# Patient Record
Sex: FEMALE | Race: WHITE | Employment: OTHER | URBAN - METROPOLITAN AREA
[De-identification: names, ages, dates, MRNs, and addresses within clinical notes are randomized per-mention and may not be internally consistent; named-entity substitution may affect disease eponyms.]

---

## 2017-01-10 ENCOUNTER — HOSPITAL ENCOUNTER (OUTPATIENT)
Dept: LAB | Age: 67
Discharge: HOME | End: 2017-01-10
Payer: COMMERCIAL

## 2017-01-10 ENCOUNTER — OFFICE VISIT (OUTPATIENT)
Dept: INTERNAL MEDICINE CLINIC | Age: 67
End: 2017-01-10

## 2017-01-10 VITALS
OXYGEN SATURATION: 96 % | BODY MASS INDEX: 31.71 KG/M2 | HEIGHT: 63 IN | RESPIRATION RATE: 17 BRPM | TEMPERATURE: 98.4 F | SYSTOLIC BLOOD PRESSURE: 138 MMHG | HEART RATE: 61 BPM | DIASTOLIC BLOOD PRESSURE: 72 MMHG | WEIGHT: 179 LBS

## 2017-01-10 DIAGNOSIS — E05.00 GRAVES DISEASE: ICD-10-CM

## 2017-01-10 DIAGNOSIS — I11.9 BENIGN HYPERTENSIVE HEART DISEASE WITHOUT HEART FAILURE: ICD-10-CM

## 2017-01-10 DIAGNOSIS — L30.9 DERMATITIS: ICD-10-CM

## 2017-01-10 DIAGNOSIS — M81.0 OSTEOPOROSIS: ICD-10-CM

## 2017-01-10 DIAGNOSIS — I48.0 PAF (PAROXYSMAL ATRIAL FIBRILLATION) (HCC): ICD-10-CM

## 2017-01-10 DIAGNOSIS — Z23 ENCOUNTER FOR IMMUNIZATION: ICD-10-CM

## 2017-01-10 DIAGNOSIS — E11.9 TYPE 2 DIABETES MELLITUS WITHOUT COMPLICATION, WITHOUT LONG-TERM CURRENT USE OF INSULIN (HCC): Primary | ICD-10-CM

## 2017-01-10 DIAGNOSIS — E11.9 TYPE 2 DIABETES MELLITUS WITHOUT COMPLICATION, WITHOUT LONG-TERM CURRENT USE OF INSULIN (HCC): ICD-10-CM

## 2017-01-10 LAB
CREAT UR-MCNC: 14.41 MG/DL (ref 30–125)
MICROALBUMIN UR-MCNC: 0.8 MG/DL (ref 0–3)
MICROALBUMIN/CREAT UR-RTO: 56 MG/G (ref 0–30)

## 2017-01-10 PROCEDURE — 82043 UR ALBUMIN QUANTITATIVE: CPT | Performed by: INTERNAL MEDICINE

## 2017-01-10 RX ORDER — METFORMIN HYDROCHLORIDE 500 MG/1
500 TABLET ORAL 2 TIMES DAILY WITH MEALS
Qty: 180 TAB | Refills: 3 | Status: SHIPPED | OUTPATIENT
Start: 2017-01-10 | End: 2018-02-16 | Stop reason: SDUPTHER

## 2017-01-10 NOTE — PATIENT INSTRUCTIONS
Dermatitis: Care Instructions  Your Care Instructions  Dermatitis is the general name used for any rash or inflammation of the skin. Different kinds of dermatitis cause different kinds of rashes. Common causes of a rash include new medicines, plants (such as poison oak or poison ivy), heat, and stress. Certain illnesses can also cause a rash. An allergic reaction to something that touches your skin, such as latex, nickel, or poison ivy, is called contact dermatitis. Contact dermatitis may also be caused by something that irritates the skin, such as bleach, a chemical, or soap. These types of rashes cannot be spread from person to person. How long your rash will last depends on what caused it. Rashes may last a few days or months. Follow-up care is a key part of your treatment and safety. Be sure to make and go to all appointments, and call your doctor if you are having problems. It's also a good idea to know your test results and keep a list of the medicines you take. How can you care for yourself at home? · Do not scratch the rash. Cut your nails short, and file them smooth. Or wear gloves if this helps keep you from scratching. · Wash the area with water only. Pat dry. · Put cold, wet cloths on the rash to reduce itching. · Keep cool, and stay out of the sun. · Leave the rash open to the air as much as possible. · If the rash itches, use hydrocortisone cream. Follow the directions on the label. Calamine lotion may help for plant rashes. · Take an over-the-counter antihistamine, such as diphenhydramine (Benadryl) or loratadine (Claritin), to help calm the itching. Read and follow all instructions on the label. · If your doctor prescribed a cream, use it as directed. If your doctor prescribed medicine, take it exactly as directed. When should you call for help?   Call your doctor now or seek immediate medical care if:  · You have symptoms of infection, such as:  ¨ Increased pain, swelling, warmth, or redness. ¨ Red streaks leading from the area. ¨ Pus draining from the area. ¨ A fever. · You have joint pain along with the rash. Watch closely for changes in your health, and be sure to contact your doctor if:  · Your rash is changing or getting worse. · You are not getting better as expected. Where can you learn more? Go to http://nickolas-selvin.info/. Enter [phone] in the search box to learn more about \"Dermatitis: Care Instructions. \"  Current as of: May 10, 2016  Content Version: 11.1  © 8223-7338 [employer]. Care instructions adapted under license by [employer] (which disclaims liability or warranty for this information). If you have questions about a medical condition or this instruction, always ask your healthcare professional. [address] any warranty or liability for your use of this information.

## 2017-01-10 NOTE — MR AVS SNAPSHOT
Visit Information Date & Time Provider Department Dept. Phone Encounter #  
 1/10/2017 12:45 PM Raisa Hartman [employer] [phone] [id number] Follow-up Instructions Return in about 4 months (around 5/10/2017) for diabetes. Upcoming Health Maintenance Date Due Hepatitis C Screening 1950 MICROALBUMIN Q1 11/18/1958 EYE EXAM RETINAL OR DILATED Q1 11/18/1958 DTaP/Tdap/Td series (1 - Tdap) 11/18/1969 Pneumococcal 65+ Low/Medium Risk (2 of 2 - PCV13) 5/20/2015 FOOT EXAM Q1 7/20/2016 GLAUCOMA SCREENING Q2Y 8/1/2016 MEDICARE YEARLY EXAM 12/4/2016 HEMOGLOBIN A1C Q6M 3/2/2017 LIPID PANEL Q1 10/7/2017 BREAST CANCER SCRN MAMMOGRAM 3/14/2018 COLONOSCOPY 7/7/2019 Allergies as of 1/10/2017  Review Complete On: 1/10/2017 By: Raisa Hartman MD  
  
 Severity Noted Reaction Type Reactions Sulfa (Sulfonamide Antibiotics)  05/20/2014   Intolerance Other (comments) Renal failure Current Immunizations  Reviewed on 10/11/2016 Name Date Influenza Vaccine 10/1/2013 Influenza Vaccine [place]) 10/6/2016 Pneumococcal Conjugate (PCV-13)  Incomplete Pneumococcal Polysaccharide (PPSV-23) 5/20/2014 Not reviewed this visit You Were Diagnosed With   
  
 Codes Comments Type 2 diabetes mellitus without complication, without long-term current use of insulin (HCC)    -  Primary ICD-10-CM: E11.9 ICD-9-CM: 250.00 Benign hypertensive heart disease without heart failure     ICD-10-CM: I11.9 ICD-9-CM: 402.10 Osteoporosis     ICD-10-CM: M81.0 ICD-9-CM: 733.00 Dermatitis     ICD-10-CM: L30.9 ICD-9-CM: 692.9 Encounter for immunization     ICD-10-CM: Z75 ICD-9-CM: V03.89 Vitals BP Pulse Temp Resp Height(growth percentile) Weight(growth percentile) 138/72 (BP 1 Location: Left arm, BP Patient Position: Sitting) 61 98.4 °F (36.9 °C) (Oral) 17 5' 3\" (1.6 m) 179 lb (81.2 kg) SpO2 BMI OB Status Smoking Status 96% 31.71 kg/m2 Postmenopausal Current Every Day Smoker Vitals History BMI and BSA Data Body Mass Index Body Surface Area 31.71 kg/m 2 1.9 m 2 Preferred Pharmacy Pharmacy Name Phone [facility] [address] [phone] Your Updated Medication List  
  
   
This list is accurate as of: 1/10/17  1:16 PM.  Always use your most recent med list.  
  
  
  
  
 apixaban 5 mg tablet Commonly known as:  Jose Croissant Take 1 Tab by mouth two (2) times a day. atorvastatin 40 mg tablet Commonly known as:  LIPITOR  
TAKE 1 TABLET DAILY  
  
 cetirizine 10 mg tablet Commonly known as:  ZYRTEC Take  by mouth daily. cholecalciferol (VITAMIN D3) 5,000 unit Tab tablet Commonly known as:  VITAMIN D3 Take 5,000 Units by mouth daily. metFORMIN 500 mg tablet Commonly known as:  GLUCOPHAGE Take 1 Tab by mouth two (2) times daily (with meals). methIMAzole 10 mg tablet Commonly known as:  TAPAZOLE Take 5 mg by mouth every Monday, Wednesday, Friday. multivitamin tablet Commonly known as:  ONE A DAY Take 1 Tab by mouth daily. NASACORT 55 mcg nasal inhaler Generic drug:  triamcinolone 1 [place] by Both Nostrils route daily as needed. omega 3-dha-epa-fish oil 100-160-1,000 mg Cap Take 1,000 mg by mouth two (2) times a day. propranolol 10 mg tablet Commonly known as:  INDERAL Take 2 Tabs by mouth two (2) times a day. ramipril 2.5 mg capsule Commonly known as:  ALTACE Take 1 Cap by mouth daily. risedronate 35 mg tablet Commonly known as:  ACTONEL  
TAKE 1 TABLET EVERY 7 DAYS  
  
 TYLENOL 325 mg tablet Generic drug:  acetaminophen Take  by mouth every four (4) hours as needed for Pain. Prescriptions Sent to Pharmacy  Refills  
 metFORMIN (GLUCOPHAGE) 500 mg tablet 3  
 Sig: Take 1 Tab by mouth two (2) times daily (with meals). Class: Normal  
 Pharmacy: [id number] Ph #: [phone] Route: Oral  
 apixaban (ELIQUIS) 5 mg tablet 11 Sig: Take 1 Tab by mouth two (2) times a day. Class: Normal  
 Pharmacy: [address] Ph #: [phone] Route: Oral  
  
We Performed the Following  DIABETES FOOT EXAM [[facility] Custom] PNEUMOCOCCAL CONJ VACCINE 13 VALENT IM [id number] CPT(R)] Follow-up Instructions Return in about 4 months (around 5/10/2017) for diabetes. To-Do List   
 01/10/2017 Lab:  MICROALBUMIN, UR, RAND W/ MICROALBUMIN/CREA RATIO Patient Instructions Dermatitis: Care Instructions Your Care Instructions Dermatitis is the general name used for any rash or inflammation of the skin. Different kinds of dermatitis cause different kinds of rashes. Common causes of a rash include new medicines, plants (such as poison oak or poison ivy), heat, and stress. Certain illnesses can also cause a rash. An allergic reaction to something that touches your skin, such as latex, nickel, or poison ivy, is called contact dermatitis. Contact dermatitis may also be caused by something that irritates the skin, such as bleach, a chemical, or soap. These types of rashes cannot be spread from person to person. How long your rash will last depends on what caused it. Rashes may last a few days or months. Follow-up care is a key part of your treatment and safety. Be sure to make and go to all appointments, and call your doctor if you are having problems. It's also a good idea to know your test results and keep a list of the medicines you take. How can you care for yourself at home? · Do not scratch the rash. Cut your nails short, and file them smooth. Or wear gloves if this helps keep you from scratching. · Wash the area with water only. Pat dry. · Put cold, wet cloths on the rash to reduce itching. · Keep cool, and stay out of the sun. · Leave the rash open to the air as much as possible. · If the rash itches, use hydrocortisone cream. Follow the directions on the label. Calamine lotion may help for plant rashes. · Take an over-the-counter antihistamine, such as diphenhydramine (Benadryl) or loratadine (Claritin), to help calm the itching. Read and follow all instructions on the label. · If your doctor prescribed a cream, use it as directed. If your doctor prescribed medicine, take it exactly as directed. When should you call for help? Call your doctor now or seek immediate medical care if: 
· You have symptoms of infection, such as: 
¨ Increased pain, swelling, warmth, or redness. ¨ Red streaks leading from the area. ¨ Pus draining from the area. ¨ A fever. · You have joint pain along with the rash. Watch closely for changes in your health, and be sure to contact your doctor if: 
· Your rash is changing or getting worse. · You are not getting better as expected. Where can you learn more? Go to http://nickolas-selvin.info/. Enter [phone] in the search box to learn more about \"Dermatitis: Care Instructions. \" Current as of: May 10, 2016 Content Version: 11.1 © 1605-1547 [employer]. Care instructions adapted under license by [employer] (which disclaims liability or warranty for this information). If you have questions about a medical condition or this instruction, always ask your healthcare professional. [place] [address] any warranty or liability for your use of this information. Introducing [facility]! Dear Tyson Talbot: Thank you for requesting a [employer] account. Our records indicate that you already have an active [employer] account. You can access your account anytime at [email or web address] Did you know that you can access your hospital and ER discharge instructions at any time in [employer]? You can also review all of your test results from your hospital stay or ER visit. Additional Information If you have questions, please visit the Frequently Asked Questions section of the [employer] website at [email or web address] Remember, GiveForward is NOT to be used for urgent needs. For medical emergencies, dial 911. Now available from your iPhone and Android! Please provide this summary of care documentation to your next provider. Your primary care clinician is listed as Radha Freeman. If you have any questions after today's visit, please call [phone].

## 2017-01-10 NOTE — PROGRESS NOTES
Pt presented today to establish care. Has pt had any falls since last 30 days? No.  Pt preferred language for health care discussion is english. Pt presently saw Dr Evaristo Nolasco as PCP. Advanced Directive? no    Is someone accompanying this pt? No    Is the patient using any DME equipment during OV? no      1. Have you been to the ER, urgent care clinic since your last 30 days? Hospitalized since your last 30 days? No    2. Have you seen or consulted any other health care providers outside of the [address] [place] [address] since your last 30 days? Dr Elli Villegas ( endo)  And Dr Enma Franklin ( cardio)    Pt  has a reminder for a \"due or due soon\" health maintenance. Pt reported eye exam done at [facility] 12/2016.  Pended micro, hep c and dm foot exam.

## 2017-01-10 NOTE — PROGRESS NOTES
HISTORY OF PRESENT ILLNESS  Nereida Zambrano is a 74 y.o. female. HPI Comments: 75 yo female here for f/u of DM, HTN, establish care with me as past PCP moved. No complaints at this time other than noting rash on her neck for the past few weeks. Improves some with prn benadryl. Recently dx with DM in SEP with A1c 7.2. Started on metformin BID but ran out a month ago. Not monitoring glc at home. HTN is well controlled when she checks at home with SBP typically 120-130. No CP. Is followed by cardiology for her A fib. Taking Eliquis. No bleeding noted. She has OP dx on DEXA in [address] [facility] [address] 2014, Taking Actonel. H/o colon polyps. Last colo 2014, f/u recommended in 5 years. Review of Systems   Constitutional: Negative for chills, fever and weight loss. HENT: Negative for congestion. Eyes: Negative for blurred vision and pain. Respiratory: Negative for cough and shortness of breath. Cardiovascular: Negative for chest pain, palpitations and leg swelling. Gastrointestinal: Negative for blood in stool, heartburn, melena, nausea and vomiting. Genitourinary: Negative for frequency and urgency. Musculoskeletal: Negative for joint pain and myalgias. Skin: Positive for itching and rash (neck, upper back). Neurological: Positive for headaches (intermittent related to sinus congestion). Negative for dizziness and tingling. Endo/Heme/Allergies: Does not bruise/bleed easily. Psychiatric/Behavioral: Negative for depression. The patient is not nervous/anxious.       Past Medical History   Diagnosis Date    Atrial fibrillation      CHADS score 2  (-CHF, +HTN, -AGE, +DM, -CVA)    Bilateral cataracts     Colon polyps      7/14  repeat 5 yrs - Dr. [facility]    Diabetes     Dyslipidemia     Electronic cigarette use     Graves disease      5/14 Grave's disease, Dr. Bronson Parrish    Hypertension     Multiple thyroid nodules      5/14    Osteoporosis      last dexa 12/14    Plantar fasciitis, bilateral       Ron[place]    Vitamin D deficiency      Current Outpatient Prescriptions on File Prior to Visit   Medication Sig Dispense Refill    metFORMIN (GLUCOPHAGE) 500 mg tablet Take 1 Tab by mouth two (2) times daily (with meals). 60 Tab 2    apixaban (ELIQUIS) 5 mg tablet Take 1 Tab by mouth two (2) times a day. 60 Tab 2    cholecalciferol, VITAMIN D3, (VITAMIN D3) 5,000 unit tab tablet Take 5,000 Units by mouth daily.  ramipril (ALTACE) 2.5 mg capsule Take 1 Cap by mouth daily. 90 Cap 3    atorvastatin (LIPITOR) 40 mg tablet TAKE 1 TABLET DAILY 90 Tab 2    propranolol (INDERAL) 10 mg tablet Take 2 Tabs by mouth two (2) times a day. 16 Tab 0    risedronate (ACTONEL) 35 mg tablet TAKE 1 TABLET EVERY 7 DAYS 12 Tab 3    cetirizine (ZYRTEC) 10 mg tablet Take  by mouth daily.  methimazole (TAPAZOLE) 10 mg tablet Take 5 mg by mouth every Monday, Wednesday, Friday.  triamcinolone (NASACORT) 55 mcg nasal inhaler 1 [place] by Both Nostrils route daily as needed.  omega 3-dha-epa-fish oil 100-160-1,000 mg cap Take 1,000 mg by mouth two (2) times a day.  multivitamin (ONE A DAY) tablet Take 1 Tab by mouth daily.  acetaminophen (TYLENOL) 325 mg tablet Take  by mouth every four (4) hours as needed for Pain. No current facility-administered medications on file prior to visit.       Social History     Social History    Marital status:      Spouse name: N/A    Number of children: N/A    Years of education: N/A     Occupational History    [email or web address] employee in [employer] employee      works in [employer] and lives here     Social History Main Topics    Smoking status: Current Every Day Smoker     Packs/day: 0.50     Years: 15.00    Smokeless tobacco: Never Used      Comment: electronic cigarettes    Alcohol use 1.8 oz/week     1 Glasses of wine, 1 Cans of beer, 1 Shots of liquor per week      Comment: occasionally    Drug use: No    Sexual activity: Not Currently      Comment:      Other Topics Concern    Not on file     Social History Narrative     Physical Exam   Constitutional: She appears well-developed and well-nourished. No distress. /72 (BP 1 Location: Left arm, BP Patient Position: Sitting)  Pulse 61  Temp 98.4 °F (36.9 °C) (Oral)   Resp 17  Ht 5' 3\" (1.6 m)  Wt 179 lb (81.2 kg)  SpO2 96%  BMI 31.71 kg/m2     Eyes: EOM are normal. Right eye exhibits no discharge. Left eye exhibits no discharge. No scleral icterus. Neck: Neck supple. Cardiovascular: Normal rate, normal heart sounds and intact distal pulses. Exam reveals no gallop and no friction rub. No murmur heard. Pulmonary/Chest: Effort normal and breath sounds normal. No respiratory distress. She has no wheezes. She has no rales. Abdominal: Soft. She exhibits no distension. There is no tenderness. There is no rebound and no guarding. Musculoskeletal: She exhibits no edema or tenderness. Lymphadenopathy:     She has no cervical adenopathy. Neurological: She is alert. She exhibits normal muscle tone. Skin: Skin is warm and dry. Psychiatric: She has a normal mood and affect. Diabetic Foot exam: 2+ dorsalis pedis pulses bilaterally. Positive monofilament in all 10 toes and bottoms of both feet. Negative for lesions, signs of infection, or foot abnormalities.     Lab Results   Component Value Date/Time    Hemoglobin A1c 7.3 09/02/2016 08:12 AM   No results found for: MCACR, MCA1, MCA2, MCA3, MCA4, UMCA, MCAU, MICALBRAT, MCAU2, MCALPOCT   Lab Results   Component Value Date/Time    Cholesterol, total 142 10/07/2016 09:56 AM    HDL Cholesterol 59 10/07/2016 09:56 AM    LDL, calculated 38 10/07/2016 09:56 AM    VLDL, calculated 45 10/07/2016 09:56 AM    Triglyceride 225 10/07/2016 09:56 AM    CHOL/HDL Ratio 2.4 10/07/2016 09:56 AM     Lab Results   Component Value Date/Time    Sodium 142 10/07/2016 09:56 AM    Potassium 4.0 10/07/2016 09:56 AM    Chloride 107 10/07/2016 09:56 AM    CO2 24 10/07/2016 09:56 AM Anion gap 11 10/07/2016 09:56 AM    Glucose 162 10/07/2016 09:56 AM    BUN 13 10/07/2016 09:56 AM    Creatinine 0.75 10/07/2016 09:56 AM    BUN/Creatinine ratio 17 10/07/2016 09:56 AM    GFR est AA >60 10/07/2016 09:56 AM    GFR est non-AA >60 10/07/2016 09:56 AM    Calcium 9.1 10/07/2016 09:56 AM    Bilirubin, total 0.6 09/02/2016 08:12 AM    ALT 53 09/02/2016 08:12 AM    AST 26 09/02/2016 08:12 AM    Alk. phosphatase 68 09/02/2016 08:12 AM    Protein, total 6.7 09/02/2016 08:12 AM    Albumin 3.8 09/02/2016 08:12 AM    Globulin 2.9 09/02/2016 08:12 AM    A-G Ratio 1.3 09/02/2016 08:12 AM     Lab Results   Component Value Date/Time    TSH 2.050 06/01/2015 09:50 AM     Lab Results   Component Value Date/Time    WBC 6.3 09/02/2016 08:12 AM    HGB 13.7 09/02/2016 08:12 AM    HCT 42.9 09/02/2016 08:12 AM    PLATELET 945 23/35/0148 08:12 AM    MCV 91.3 09/02/2016 08:12 AM     DEXA Results (most recent):    Results from Hospital Encounter encounter on 12/16/14   DEXA BONE DENSITY STUDY AXIAL   Narrative DXA BONE DENSITOMETRY, CENTRAL    INDICATION:  Postmenopausal, screening. COMPARISON: None    TECHNIQUE: Using GE LUNAR Prodigy densitometer, bone density measurement was  performed in the lumbar spine in addition to the proximal left and right femurs. T-score refers to standard deviations above or below average compared to a  young adult of the same sex. Z-score refers to standard deviations above or  below average compared to a patient of the same sex, age, race and weight. VALIDITY:    Valid at the spine and both hips. Lumbar spine levels L2-4 were selected because  of degenerative endplate sclerosis at the more inferior levels. RESULTS:    The bone mineral density of the L2-L4 vertebral bodies is 0.867 g/cm2. This  corresponds to a T-score of -2.8. The bone mineral density of the left femoral neck is 0.644 g/cm2. This  corresponds to a T-score of -2.8.      The bone mineral density of the right femoral neck is 0.674 g/cm2. This  corresponds to a T-score of -2.6. FRAX CALCULATION 10-YEAR PROBABILITY OF FRACTURE:    Major osteoporotic compression fracture: 15.5 %  Hip fracture: 4.1 %    The National Osteoporosis Foundation has recommended that treatment be  considered for those with a 10-year probability of:    Major osteoporotic compression fracture: >20%  Hip fracture: >3%           Impression IMPRESSION:    Bone mineral density measures osteoporotic. Fracture risk is high. Treatment is  advised and recommend followup DEXA scan in one year following initiation of  treatment to assess response to therapy. COMMENTS:  Based upon current ISCD guidelines, the patient's overall diagnostic criteria,  selected using WHO criteria in postmenopausal women and males aged 48 and above,  is selected based upon the lowest T-score from among the lumbar spine, total  femur, femoral neck, (or distal third radius, if measured). WHO Definition of Osteoporosis and Osteopenia on DXA (specified for  post-menopausal  females):     Normal:                     T-Score at or above -1 SD   Osteopenia:              T-Score between -1 and -2.5 SD   Osteoporosis:           T-Score at or below -2.5 SD    The risk of fracture approximately doubles for each 1 SD decrease in T-score. It is important to consider other factors in assessing a patient's risk of  fracture, including age, risk of falling/injury, history of fragility fracture,  family history of osteoporosis, smoking, low weight. RECOMMENDATIONS:  National Osteoporosis Foundation (NOF) guidelines recommend initiating therapy  to reduce fracture risk in women with BMD: T-score below -2 SD or T-score below  -1.5 SD with other risk factors present. * Mild to aggressive therapies are available in the form of hormone replacement  therapy (HRT), bisphosphonates, Calcitonin, and SERMs.  Additionally, all  patients should insure an adequate intake of dietary calcium (1200 mg/d) and  vitamin D (400-800 IU daily). * People with diagnosed cases of osteoporosis or osteopenia should be regularly  tested for bone mineral density. For patient's eligible for Medicare, routine  testing is allowed once every 2 years. The testing frequently can be increased  to one year for patients who have rapidly progressing disease, or for those who  are receiving medical therapy to restore bone mass. ASSESSMENT and PLAN    ICD-10-CM ICD-9-CM    1. Type 2 diabetes mellitus without complication, without long-term current use of insulin (HCC) E11.9 250.00  DIABETES FOOT EXAM      MICROALBUMIN, UR, RAND W/ MICROALBUMIN/CREA RATIO   2. Hypertension I11.9 402.10    3. Osteoporosis M81.0 733.00    4. Dermatitis L30.9 692.9    5. Encounter for immunization Z23 V03.89 PNEUMOCOCCAL CONJ VACCINE 13 VALENT IM   Metformin refilled. Continue with prior dose for now and repeat labs next visit. Microalb today. Encouraged eye exam annually. BP controlled on manual and on home readings. Will continue with current medication for now. OP on DEXA in 2014. Continue on Actonel. Consider repeat DEXA to reassess. Will try OTC hydrocortisone for now on rash. Call if sx persist/worsen. rmin refilled. Continue with prior dose for now and repeat labs next visit. Continue f/u with cardiology for a fib and endo for graves.

## 2017-03-30 ENCOUNTER — OFFICE VISIT (OUTPATIENT)
Dept: CARDIOLOGY CLINIC | Age: 67
End: 2017-03-30

## 2017-03-30 VITALS
HEIGHT: 63 IN | SYSTOLIC BLOOD PRESSURE: 151 MMHG | OXYGEN SATURATION: 97 % | HEART RATE: 69 BPM | WEIGHT: 179 LBS | DIASTOLIC BLOOD PRESSURE: 70 MMHG | BODY MASS INDEX: 31.71 KG/M2

## 2017-03-30 DIAGNOSIS — E78.00 PURE HYPERCHOLESTEROLEMIA: ICD-10-CM

## 2017-03-30 DIAGNOSIS — I10 ESSENTIAL HYPERTENSION WITH GOAL BLOOD PRESSURE LESS THAN 140/90: Primary | ICD-10-CM

## 2017-03-30 DIAGNOSIS — I48.0 PAF (PAROXYSMAL ATRIAL FIBRILLATION) (HCC): ICD-10-CM

## 2017-03-30 NOTE — MR AVS SNAPSHOT
Visit Information Date & Time Provider Department Dept. Phone Encounter #  
 3/30/2017  9:45 AM Diego Berman MD [address] [place] [address] Specialist at [facility] [phone] [id number] Follow-up Instructions Return in about 9 months (around 12/30/2017). Your Appointments 5/10/2017 11:30 AM  
[facility] Appt Note: 4 month f/u dm  
 [address]  
  
   
 [address] [facility] Upcoming Health Maintenance Date Due  
 EYE EXAM RETINAL OR DILATED Q1 11/18/1958 DTaP/Tdap/Td series (1 - Tdap) 11/18/1969 GLAUCOMA SCREENING Q2Y 8/1/2016 HEMOGLOBIN A1C Q6M 3/2/2017 Hepatitis C Screening 4/1/2017* MEDICARE YEARLY EXAM 4/1/2017* LIPID PANEL Q1 10/7/2017 FOOT EXAM Q1 1/10/2018 MICROALBUMIN Q1 1/10/2018 BREAST CANCER SCRN MAMMOGRAM 3/14/2018 COLONOSCOPY 7/7/2019 *Topic was postponed. The date shown is not the original due date. Allergies as of 3/30/2017  Review Complete On: 3/30/2017 By: Shelbi Groves RN Severity Noted Reaction Type Reactions Sulfa (Sulfonamide Antibiotics)  05/20/2014   Intolerance Other (comments) Renal failure Current Immunizations  Reviewed on 1/10/2017 Name Date Influenza Vaccine 10/1/2013 Influenza Vaccine [place]) 10/6/2016 Pneumococcal Conjugate (PCV-13) 1/10/2017 Pneumococcal Polysaccharide (PPSV-23) 5/20/2014 Not reviewed this visit Vitals BP Pulse Height(growth percentile) Weight(growth percentile) SpO2 BMI  
 151/70 69 5' 3\" (1.6 m) 179 lb (81.2 kg) 97% 31.71 kg/m2 OB Status Smoking Status Postmenopausal Former Smoker BMI and BSA Data Body Mass Index Body Surface Area 31.71 kg/m 2 1.9 m 2 Preferred Pharmacy Pharmacy Name Phone [facility] [address] [facility] [phone] Your Updated Medication List  
  
   
This list is accurate as of: 3/30/17 10:04 AM.  Always use your most recent med list.  
  
  
  
  
 apixaban 5 mg tablet Commonly known as:  Aida Timothy Take 1 Tab by mouth two (2) times a day. atorvastatin 40 mg tablet Commonly known as:  LIPITOR  
TAKE 1 TABLET DAILY  
  
 cetirizine 10 mg tablet Commonly known as:  ZYRTEC Take  by mouth daily. cholecalciferol (VITAMIN D3) 5,000 unit Tab tablet Commonly known as:  VITAMIN D3 Take 5,000 Units by mouth daily. metFORMIN 500 mg tablet Commonly known as:  GLUCOPHAGE Take 1 Tab by mouth two (2) times daily (with meals). methIMAzole 10 mg tablet Commonly known as:  TAPAZOLE Take 5 mg by mouth every Monday, Wednesday, Friday. multivitamin tablet Commonly known as:  ONE A DAY Take 1 Tab by mouth daily. NASACORT 55 mcg nasal inhaler Generic drug:  triamcinolone 1 [place] by Both Nostrils route daily as needed. omega 3-dha-epa-fish oil 100-160-1,000 mg Cap Take 1,000 mg by mouth two (2) times a day. propranolol 10 mg tablet Commonly known as:  INDERAL Take 2 Tabs by mouth two (2) times a day. ramipril 2.5 mg capsule Commonly known as:  ALTACE Take 1 Cap by mouth daily. risedronate 35 mg tablet Commonly known as:  ACTONEL  
TAKE 1 TABLET EVERY 7 DAYS  
  
 TYLENOL 325 mg tablet Generic drug:  acetaminophen Take  by mouth every four (4) hours as needed for Pain. Follow-up Instructions Return in about 9 months (around 12/30/2017). Introducing [facility]! Dear Romy Ramirez: Thank you for requesting a pMediaNetwork account. Our records indicate that you already have an active pMediaNetwork account. You can access your account anytime at [email or web address] Did you know that you can access your hospital and ER discharge instructions at any time in Gemfire? You can also review all of your test results from your hospital stay or ER visit. Additional Information If you have questions, please visit the Frequently Asked Questions section of the Gemfire website at [email or web address] Remember, Gemfire is NOT to be used for urgent needs. For medical emergencies, dial 911. Now available from your iPhone and Android! Please provide this summary of care documentation to your next provider. Your primary care clinician is listed as Idalia Galindo. If you have any questions after today's visit, please call [phone].

## 2017-03-30 NOTE — PROGRESS NOTES
1. Have you been to the ER, urgent care clinic since your last visit? Hospitalized since your last visit? No    2. Have you seen or consulted any other health care providers outside of the [address] [place] [address] since your last visit? Include any pap smears or colon screening.  No

## 2017-03-30 NOTE — PROGRESS NOTES
Cardiovascular Specialists    HPI:   Ms. Zenobia Mosqueda is a 74 y.o. female with history of diabetes and hypertension. She has atrial fibrillation that was diagnosed in the clinical setting of a hyperthyroid state in 2014. Paroxsymal in nature. Ms. Zenobia Mosqueda is here today for follow up appointment. She used to be an established patient of Dr. Nataly Plaza. She is here today for follow up appointment. She denies any symptoms to suggest angina or heart failure. She denies any palpitations, presyncope or syncope. She said that approximately three months ago she had an episode of palpitations lasting less than 5 minutes. Her episodes are very infrequent. She does not consume excessive caffeine or any soda. Past Medical History:   Diagnosis Date    Atrial fibrillation     CHADS score 2  (-CHF, +HTN, -AGE, +DM, -CVA)    Bilateral cataracts     Colon polyps     7/14  repeat 5 yrs - Dr. Aristeo Douglas    Diabetes     Dyslipidemia     Electronic cigarette use     Graves disease     5/14 Grave's disease, Dr. Nithin Ortega    Hypertension     Multiple thyroid nodules     5/14    Osteoporosis     last dexa 12/14    Plantar fasciitis, bilateral     Dr. Stanley Gray Vitamin D deficiency        Past Surgical History:   Procedure Laterality Date    HX CATARACT REMOVAL      bilateral    HX CHOLECYSTECTOMY      2000    HX COLONOSCOPY      7/14  Dr. Aristeo Douglas - repeat 7/19    HX ORTHOPAEDIC      1965  left knee    HX ORTHOPAEDIC      L shoulder x 2    HX WISDOM TEETH EXTRACTION         Current Outpatient Prescriptions   Medication Sig    metFORMIN (GLUCOPHAGE) 500 mg tablet Take 1 Tab by mouth two (2) times daily (with meals).  apixaban (ELIQUIS) 5 mg tablet Take 1 Tab by mouth two (2) times a day.  cholecalciferol, VITAMIN D3, (VITAMIN D3) 5,000 unit tab tablet Take 5,000 Units by mouth daily.  ramipril (ALTACE) 2.5 mg capsule Take 1 Cap by mouth daily.     atorvastatin (LIPITOR) 40 mg tablet TAKE 1 TABLET DAILY    propranolol (INDERAL) 10 mg tablet Take 2 Tabs by mouth two (2) times a day.  risedronate (ACTONEL) 35 mg tablet TAKE 1 TABLET EVERY 7 DAYS    methimazole (TAPAZOLE) 10 mg tablet Take 5 mg by mouth every Monday, Wednesday, Friday.  triamcinolone (NASACORT) 55 mcg nasal inhaler 1 [place] by Both Nostrils route daily as needed.  omega 3-dha-epa-fish oil 100-160-1,000 mg cap Take 1,000 mg by mouth two (2) times a day.  multivitamin (ONE A DAY) tablet Take 1 Tab by mouth daily.  acetaminophen (TYLENOL) 325 mg tablet Take  by mouth every four (4) hours as needed for Pain.  cetirizine (ZYRTEC) 10 mg tablet Take  by mouth daily. No current facility-administered medications for this visit. Allergies   Allergen Reactions    Sulfa (Sulfonamide Antibiotics) Other (comments)     Renal failure        Family History:    Heart Disease Father        Social History:   She  reports that she quit smoking about 3 years ago. Her smoking use included Cigarettes. She has a 7.50 pack-year smoking history. She has never used smokeless tobacco.  She  reports that she drinks about 1.8 oz of alcohol per week     Physical:   Vitals:   BP: 151/70  HR: 69  Wt: 179 lb (81.2 kg)    Exam:   General: Older woman, no complaints, no distress.     Head/Neck: No JVD    No bruits. Lungs:  No respiratory distress. Clear with good effort. Heart:  Regular.  No murmur. No rubs or gallops. Abdomen: Soft. Bowel sounds present    No edema.     Neurological: Alert and oriented to person, place, time. No gross neurological deficit. Skin:  Warm and dry.     Review of Data:   LABS:   Lab Results   Component Value Date/Time    WBC 6.3 09/02/2016 08:12 AM    HGB 13.7 09/02/2016 08:12 AM    HCT 42.9 09/02/2016 08:12 AM    PLATELET 376 37/29/3601 08:12 AM     Lab Results   Component Value Date/Time    Sodium 142 10/07/2016 09:56 AM    Potassium 4.0 10/07/2016 09:56 AM    Chloride 107 10/07/2016 09:56 AM    CO2 24 10/07/2016 09:56 AM    Anion gap 11 10/07/2016 09:56 AM    Glucose 162 10/07/2016 09:56 AM    BUN 13 10/07/2016 09:56 AM    Creatinine 0.75 10/07/2016 09:56 AM    BUN/Creatinine ratio 17 10/07/2016 09:56 AM    GFR est AA >60 10/07/2016 09:56 AM    GFR est non-AA >60 10/07/2016 09:56 AM    Calcium 9.1 10/07/2016 09:56 AM    Bilirubin, total 0.6 09/02/2016 08:12 AM    AST (SGOT) 26 09/02/2016 08:12 AM    Alk. phosphatase 68 09/02/2016 08:12 AM    Protein, total 6.7 09/02/2016 08:12 AM    Albumin 3.8 09/02/2016 08:12 AM    Globulin 2.9 09/02/2016 08:12 AM    A-G Ratio 1.3 09/02/2016 08:12 AM    ALT (SGPT) 53 09/02/2016 08:12 AM     Lab Results   Component Value Date/Time    Cholesterol, total 142 10/07/2016 09:56 AM    HDL Cholesterol 59 10/07/2016 09:56 AM    LDL, calculated 38 10/07/2016 09:56 AM    Triglyceride 225 10/07/2016 09:56 AM    CHOL/HDL Ratio 2.4 10/07/2016 09:56 AM     Lab Results   Component Value Date/Time    Hemoglobin A1c 7.3 09/02/2016 08:12 AM     EKG:   sinus rhythm, 62 beats per minute, nonspecific ST-T wave changes. ECHO: July 2014:  LEFT VENTRICLE: Size was normal. Systolic function was normal by visual assessment. Ejection fraction was estimated in the range of 55-60%. No obvious wall motion abnormalities identified in the views obtained. Wall thickness was at the upper limits of normal. DOPPLER: Doppler parameters were consistent with abnormal left ventricular relaxation (grade 1 diastolic dysfunction). RIGHT VENTRICLE: The size was normal. Systolic function was normal. DOPPLER: Estimated peak pressure was in the range of 40-45mmHg. LEFT ATRIUM: Size was at the upper limits of normal. LA volume index was 25 ml/m squared. RIGHT ATRIUM: Size was normal.    MITRAL VALVE: Normal valve structure. DOPPLER: There was no evidence for stenosis. There was mild regurgitation. AORTIC VALVE: Not well visualized. DOPPLER: There was no stenosis. There was mild regurgitation.     TRICUSPID VALVE: Normal valve structure. DOPPLER: There was no evidence for tricuspid stenosis. There was mild to moderate regurgitation. PULMONIC VALVE: Not well visualized, but normal Doppler findings. DOPPLER: There was no stenosis. There was no significant regurgitation. AORTA: The abdominal aorta measured 2.2 cm. The root exhibited normal size. PERICARDIUM: There was no pericardial effusion. The pericardium was normal in appearance. STRESS TEST (EST, PHARM, NUC, ECHO etc):     CATHETERIZATION:       Impression / Plan:      Atrial fibrillation:  Ms. Vi Garza was diagnosed with atrial fibrillation in the setting of hyperthyroidism. This is paroxysmal in nature. Episodes are very infrequent, usually last less than 5 minutes without any associated symptoms. Currently she is on Inderal 10 mg twice daily and she is on Eliquis 5 mg twice daily. She denies any side effects from the medication. For now I would not recommend any changes. Diabetes:  Goal hemoglobin A1c less than 7 is recommended from a cardiovascular standpoint. Last hemoglobin A1c on file is from September, 2016 and it is 7.3. I would defer this management to primary care provider. Hyperlipidemia:  Currently she is on Atorvastatin 40 mg daily. Last lipid profile was in October, 2016 with LDL level of 38. She denies any side effects from the medication. Hypertension:  Blood pressure today is 150/70 mmHg. Currently she is on Ramipril and Inderal.  I would recommend to continue both. Usually at home her blood pressure ranges from 992-764 systolic. I would not recommend any changes at this time. Other than the above, or unless any additional issues arise, I have asked that she follow up in six-nine months.

## 2017-05-10 ENCOUNTER — HOSPITAL ENCOUNTER (OUTPATIENT)
Dept: LAB | Age: 67
Discharge: HOME | End: 2017-05-10
Payer: COMMERCIAL

## 2017-05-10 ENCOUNTER — OFFICE VISIT (OUTPATIENT)
Dept: INTERNAL MEDICINE CLINIC | Age: 67
End: 2017-05-10

## 2017-05-10 VITALS
RESPIRATION RATE: 20 BRPM | TEMPERATURE: 98.4 F | DIASTOLIC BLOOD PRESSURE: 73 MMHG | HEIGHT: 63 IN | OXYGEN SATURATION: 96 % | SYSTOLIC BLOOD PRESSURE: 132 MMHG | BODY MASS INDEX: 31.47 KG/M2 | WEIGHT: 177.6 LBS | HEART RATE: 59 BPM

## 2017-05-10 DIAGNOSIS — Z11.59 NEED FOR HEPATITIS C SCREENING TEST: ICD-10-CM

## 2017-05-10 DIAGNOSIS — Z23 ENCOUNTER FOR IMMUNIZATION: ICD-10-CM

## 2017-05-10 DIAGNOSIS — E11.9 TYPE 2 DIABETES MELLITUS WITHOUT COMPLICATION, WITHOUT LONG-TERM CURRENT USE OF INSULIN (HCC): ICD-10-CM

## 2017-05-10 DIAGNOSIS — E11.9 TYPE 2 DIABETES MELLITUS WITHOUT COMPLICATION, WITHOUT LONG-TERM CURRENT USE OF INSULIN (HCC): Primary | ICD-10-CM

## 2017-05-10 DIAGNOSIS — I11.9 BENIGN HYPERTENSIVE HEART DISEASE WITHOUT HEART FAILURE: ICD-10-CM

## 2017-05-10 DIAGNOSIS — M25.512 ACUTE PAIN OF LEFT SHOULDER: ICD-10-CM

## 2017-05-10 LAB
EST. AVERAGE GLUCOSE BLD GHB EST-MCNC: 143 MG/DL
HBA1C MFR BLD: 6.6 % (ref 4.2–5.6)

## 2017-05-10 PROCEDURE — 36415 COLL VENOUS BLD VENIPUNCTURE: CPT | Performed by: INTERNAL MEDICINE

## 2017-05-10 PROCEDURE — 83036 HEMOGLOBIN GLYCOSYLATED A1C: CPT | Performed by: INTERNAL MEDICINE

## 2017-05-10 PROCEDURE — 86803 HEPATITIS C AB TEST: CPT | Performed by: INTERNAL MEDICINE

## 2017-05-10 RX ORDER — NAPROXEN 500 MG/1
500 TABLET ORAL 2 TIMES DAILY WITH MEALS
Qty: 60 TAB | Refills: 2 | Status: SHIPPED | OUTPATIENT
Start: 2017-05-10 | End: 2017-12-26 | Stop reason: SDUPTHER

## 2017-05-10 RX ORDER — PROPRANOLOL HYDROCHLORIDE 10 MG/1
20 TABLET ORAL 2 TIMES DAILY
Qty: 16 TAB | Refills: 0 | Status: SHIPPED | OUTPATIENT
Start: 2017-05-10 | End: 2017-05-17 | Stop reason: SDUPTHER

## 2017-05-10 RX ORDER — RISEDRONATE SODIUM 35 MG/1
TABLET, FILM COATED ORAL
Qty: 12 TAB | Refills: 3 | Status: SHIPPED | OUTPATIENT
Start: 2017-05-10 | End: 2018-03-12 | Stop reason: SDUPTHER

## 2017-05-10 RX ORDER — ATORVASTATIN CALCIUM 40 MG/1
TABLET, FILM COATED ORAL
Qty: 90 TAB | Refills: 2 | Status: SHIPPED | OUTPATIENT
Start: 2017-05-10 | End: 2017-12-26 | Stop reason: SDUPTHER

## 2017-05-10 NOTE — MR AVS SNAPSHOT
Visit Information Date & Time Provider Department Dept. Phone Encounter #  
 5/10/2017 11:30 AM [address] [phone] [id number] Follow-up Instructions Return in about 3 months (around 8/10/2017), or if symptoms worsen or fail to improve, for Medicare Wellness Visit. Your Appointments 12/28/2017  9:45 AM  
Follow Up with Diego Paredes MD  
Cardio Specialist at [facility]) Appt Note: 9 month follow up  
 [address] [facility] [address]  
  
   
 [address] [facility] [address] Upcoming Health Maintenance Date Due  
 EYE EXAM RETINAL OR DILATED Q1 11/18/1958 DTaP/Tdap/Td series (1 - Tdap) 11/18/1969 GLAUCOMA SCREENING Q2Y 8/1/2016 MEDICARE YEARLY EXAM 6/1/2017* INFLUENZA AGE 9 TO ADULT 8/1/2017 LIPID PANEL Q1 10/7/2017 HEMOGLOBIN A1C Q6M 11/10/2017 FOOT EXAM Q1 1/10/2018 MICROALBUMIN Q1 1/10/2018 BREAST CANCER SCRN MAMMOGRAM 3/14/2018 COLONOSCOPY 7/7/2019 *Topic was postponed. The date shown is not the original due date. Allergies as of 5/10/2017  Review Complete On: 5/10/2017 By: Lisa Mitchell LPN Severity Noted Reaction Type Reactions Sulfa (Sulfonamide Antibiotics)  05/20/2014   Intolerance Other (comments) Renal failure Current Immunizations  Reviewed on 1/10/2017 Name Date Influenza Vaccine 10/1/2013 Influenza Vaccine Tj Michaels) 10/6/2016 Pneumococcal Conjugate (PCV-13) 1/10/2017 Pneumococcal Polysaccharide (PPSV-23) 5/20/2014 Tdap  Incomplete Not reviewed this visit You Were Diagnosed With   
  
 Codes Comments Type 2 diabetes mellitus without complication, without long-term current use of insulin (HCC)    -  Primary ICD-10-CM: E11.9 ICD-9-CM: 250.00 Benign hypertensive heart disease without heart failure     ICD-10-CM: I11.9 ICD-9-CM: 402.10 Acute pain of left shoulder     ICD-10-CM: M25.512 ICD-9-CM: 719.41 Encounter for immunization     ICD-10-CM: A43 ICD-9-CM: V03.89 Need for hepatitis C screening test     ICD-10-CM: Z11.59 
ICD-9-CM: V73.89 Vitals BP Pulse Temp Resp Height(growth percentile) Weight(growth percentile) 132/73 (BP 1 Location: Left arm, BP Patient Position: Sitting) (!) 59 98.4 °F (36.9 °C) (Oral) 20 5' 3\" (1.6 m) 177 lb 9.6 oz (80.6 kg) SpO2 BMI OB Status Smoking Status 96% 31.46 kg/m2 Postmenopausal Former Smoker BMI and BSA Data Body Mass Index Body Surface Area  
 31.46 kg/m 2 1.89 m 2 Preferred Pharmacy Pharmacy Name Phone [address] [phone] Your Updated Medication List  
  
   
This list is accurate as of: 5/10/17 12:11 PM.  Always use your most recent med list.  
  
  
  
  
 apixaban 5 mg tablet Commonly known as:  Norma Cram Take 1 Tab by mouth two (2) times a day. atorvastatin 40 mg tablet Commonly known as:  LIPITOR  
TAKE 1 TABLET DAILY  
  
 cetirizine 10 mg tablet Commonly known as:  ZYRTEC Take  by mouth daily. cholecalciferol (VITAMIN D3) 5,000 unit Tab tablet Commonly known as:  VITAMIN D3 Take 5,000 Units by mouth daily. metFORMIN 500 mg tablet Commonly known as:  GLUCOPHAGE Take 1 Tab by mouth two (2) times daily (with meals). methIMAzole 10 mg tablet Commonly known as:  TAPAZOLE Take 5 mg by mouth every Monday, Wednesday, Friday. multivitamin tablet Commonly known as:  ONE A DAY Take 1 Tab by mouth daily. naproxen 500 mg tablet Commonly known as:  NAPROSYN Take 1 Tab by mouth two (2) times daily (with meals). Indications: TENDONITIS  
  
 NASACORT 55 mcg nasal inhaler Generic drug:  triamcinolone 1 [place] by Both Nostrils route daily as needed. omega 3-dha-epa-fish oil 100-160-1,000 mg Cap Take 1,000 mg by mouth two (2) times a day. propranolol 10 mg tablet Commonly known as:  INDERAL Take 2 Tabs by mouth two (2) times a day. ramipril 2.5 mg capsule Commonly known as:  ALTACE Take 1 Cap by mouth daily. risedronate 35 mg tablet Commonly known as:  ACTONEL  
TAKE 1 TABLET EVERY 7 DAYS  
  
 TYLENOL 325 mg tablet Generic drug:  acetaminophen Take  by mouth every four (4) hours as needed for Pain. Prescriptions Sent to Pharmacy Refills  
 risedronate (ACTONEL) 35 mg tablet 3 Sig: TAKE 1 TABLET EVERY 7 DAYS Class: Normal  
 Pharmacy:  [address] Ph #: [phone]  
 atorvastatin (LIPITOR) 40 mg tablet 2 Sig: TAKE 1 TABLET DAILY Class: Normal  
 Pharmacy:  [address] [facility] Ph #: [phone]  
 propranolol (INDERAL) 10 mg tablet 0 Sig: Take 2 Tabs by mouth two (2) times a day. Class: Normal  
 Pharmacy:  [address] Ph #: [phone] Route: Oral  
 naproxen (NAPROSYN) 500 mg tablet 2 Sig: Take 1 Tab by mouth two (2) times daily (with meals). Indications: TENDONITIS Class: Normal  
 Pharmacy:  [address] [facility] Ph #: [phone] Route: Oral  
  
We Performed the Following TETANUS, DIPHTHERIA TOXOIDS AND ACELLULAR PERTUSSIS VACCINE (TDAP), IN INDIVIDS. >=7, IM [id number] CPT(R)] Follow-up Instructions Return in about 3 months (around 8/10/2017), or if symptoms worsen or fail to improve, for Medicare Wellness Visit. To-Do List   
 05/10/2017 Lab:  HEMOGLOBIN A1C WITH EAG   
  
 05/10/2017 Lab:  HEPATITIS C AB Patient Instructions Shoulder Stretches: Exercises Your Care Instructions Here are some examples of exercises for your shoulder. Start each exercise slowly. Ease off the exercise if you start to have pain.  
Your doctor or physical therapist will tell you when you can start these exercises and which ones will work best for you. How to do the exercises Note: These exercises should cause you to feel a gentle stretch, but no pain. Shoulder stretch 1.  a doorway and place one arm against the door frame. Your elbow should be a little higher than your shoulder. 2. Relax your shoulders as you lean forward, allowing your chest and shoulder muscles to stretch. You can also turn your body slightly away from your arm to stretch the muscles even more. 3. Hold for 15 to 30 seconds. 4. Repeat 2 to 4 times with each arm. Shoulder and chest stretch Shoulder and chest stretch 1. While sitting, relax your upper body so you slump slightly in your chair. 2. As you breathe in, straighten your back and open your arms out to the sides. 3. Gently pull your shoulder blades back and downward. 4. Hold for 15 to 30 seconds as your breathe normally. 5. Repeat 2 to 4 times. Overhead stretch 1. Reach up over your head with both arms. 2. Hold for 15 to 30 seconds. 3. Repeat 2 to 4 times. Follow-up care is a key part of your treatment and safety. Be sure to make and go to all appointments, and call your doctor if you are having problems. It's also a good idea to know your test results and keep a list of the medicines you take. Where can you learn more? Go to http://nickolas-selvin.info/. Enter S254 in the search box to learn more about \"Shoulder Stretches: Exercises. \" Current as of: May 23, 2016 Content Version: 11.2 © 6761-3976 Agito Networks. Care instructions adapted under license by CloudEngine (which disclaims liability or warranty for this information). If you have questions about a medical condition or this instruction, always ask your healthcare professional. [place] [address] any warranty or liability for your use of this information. Shoulder Pain: Care Instructions Your Care Instructions You can hurt your shoulder by using it too much during an activity, such as fishing or baseball. It can also happen as part of the everyday wear and tear of getting older. Shoulder injuries can be slow to heal, but your shoulder should get better with time. Your doctor may recommend a sling to rest your shoulder. If you have injured your shoulder, you may need testing and treatment. Follow-up care is a key part of your treatment and safety. Be sure to make and go to all appointments, and call your doctor if you are having problems. It's also a good idea to know your test results and keep a list of the medicines you take. How can you care for yourself at home? · Take pain medicines exactly as directed. ¨ If the doctor gave you a prescription medicine for pain, take it as prescribed. ¨ If you are not taking a prescription pain medicine, ask your doctor if you can take an over-the-counter medicine. ¨ Do not take two or more pain medicines at the same time unless the doctor told you to. Many pain medicines contain acetaminophen, which is Tylenol. Too much acetaminophen (Tylenol) can be harmful. · If your doctor recommends that you wear a sling, use it as directed. Do not take it off before your doctor tells you to. · Put ice or a cold pack on the sore area for 10 to 20 minutes at a time. Put a thin cloth between the ice and your skin. · If there is no swelling, you can put moist heat, a heating pad, or a warm cloth on your shoulder. Some doctors suggest alternating between hot and cold. · Rest your shoulder for a few days. If your doctor recommends it, you can then begin gentle exercise of the shoulder, but do not lift anything heavy. When should you call for help? Call 911 anytime you think you may need emergency care. For example, call if: 
· You have chest pain or pressure. This may occur with: ¨ Sweating. ¨ Shortness of breath. ¨ Nausea or vomiting. ¨ Pain that spreads from the chest to the neck, jaw, or one or both shoulders or arms. ¨ Dizziness or lightheadedness. ¨ A fast or uneven pulse. After calling 911, chew 1 adult-strength aspirin. Wait for an ambulance. Do not try to drive yourself. · Your arm or hand is cool or pale or changes color. Call your doctor now or seek immediate medical care if: 
· You have signs of infection, such as: 
¨ Increased pain, swelling, warmth, or redness in your shoulder. ¨ Red streaks leading from a place on your shoulder. ¨ Pus draining from an area of your shoulder. ¨ Swollen lymph nodes in your neck, armpits, or groin. ¨ A fever. Watch closely for changes in your health, and be sure to contact your doctor if: 
· You cannot use your shoulder. · Your shoulder does not get better as expected. Where can you learn more? Go to http://nickolas-selvin.info/. Enter E325 in the search box to learn more about \"Shoulder Pain: Care Instructions. \" Current as of: May 23, 2016 Content Version: 11.2 © 3887-6285 Ocean's Halo. Care instructions adapted under license by Liquidmetal Technologies (which disclaims liability or warranty for this information). If you have questions about a medical condition or this instruction, always ask your healthcare professional. [address] any warranty or liability for your use of this information. Introducing [facility] & [facility]! Dear Teddy Ríos: Thank you for requesting a Verengo Solar account. Our records indicate that you already have an active Verengo Solar account. You can access your account anytime at [email or web address] Did you know that you can access your hospital and ER discharge instructions at any time in Verengo Solar? You can also review all of your test results from your hospital stay or ER visit. Additional Information If you have questions, please visit the Frequently Asked Questions section of the Breadcrumbtracking website at [email or web address] Remember, Breadcrumbtracking is NOT to be used for urgent needs. For medical emergencies, dial 911. Now available from your iPhone and Android! Please provide this summary of care documentation to your next provider. Your primary care clinician is listed as Idalia Galindo. If you have any questions after today's visit, please call [phone].

## 2017-05-10 NOTE — PATIENT INSTRUCTIONS
Shoulder Stretches: Exercises  Your Care Instructions  Here are some examples of exercises for your shoulder. Start each exercise slowly. Ease off the exercise if you start to have pain. Your doctor or physical therapist will tell you when you can start these exercises and which ones will work best for you. How to do the exercises  Note: These exercises should cause you to feel a gentle stretch, but no pain. Shoulder stretch    1.  a doorway and place one arm against the door frame. Your elbow should be a little higher than your shoulder. 2. Relax your shoulders as you lean forward, allowing your chest and shoulder muscles to stretch. You can also turn your body slightly away from your arm to stretch the muscles even more. 3. Hold for 15 to 30 seconds. 4. Repeat 2 to 4 times with each arm. Shoulder and chest stretch    Shoulder and chest stretch  1. While sitting, relax your upper body so you slump slightly in your chair. 2. As you breathe in, straighten your back and open your arms out to the sides. 3. Gently pull your shoulder blades back and downward. 4. Hold for 15 to 30 seconds as your breathe normally. 5. Repeat 2 to 4 times. Overhead stretch    1. Reach up over your head with both arms. 2. Hold for 15 to 30 seconds. 3. Repeat 2 to 4 times. Follow-up care is a key part of your treatment and safety. Be sure to make and go to all appointments, and call your doctor if you are having problems. It's also a good idea to know your test results and keep a list of the medicines you take. Where can you learn more? Go to http://nickolas-selvin.info/. Enter S254 in the search box to learn more about \"Shoulder Stretches: Exercises. \"  Current as of: May 23, 2016  Content Version: 11.2  © 6923-8268 [employer]. Care instructions adapted under license by [employer] (which disclaims liability or warranty for this information).  If you have questions about a medical condition or this instruction, always ask your healthcare professional. [address] any warranty or liability for your use of this information. Shoulder Pain: Care Instructions  Your Care Instructions    You can hurt your shoulder by using it too much during an activity, such as fishing or baseball. It can also happen as part of the everyday wear and tear of getting older. Shoulder injuries can be slow to heal, but your shoulder should get better with time. Your doctor may recommend a sling to rest your shoulder. If you have injured your shoulder, you may need testing and treatment. Follow-up care is a key part of your treatment and safety. Be sure to make and go to all appointments, and call your doctor if you are having problems. It's also a good idea to know your test results and keep a list of the medicines you take. How can you care for yourself at home? · Take pain medicines exactly as directed. ¨ If the doctor gave you a prescription medicine for pain, take it as prescribed. ¨ If you are not taking a prescription pain medicine, ask your doctor if you can take an over-the-counter medicine. ¨ Do not take two or more pain medicines at the same time unless the doctor told you to. Many pain medicines contain acetaminophen, which is Tylenol. Too much acetaminophen (Tylenol) can be harmful. · If your doctor recommends that you wear a sling, use it as directed. Do not take it off before your doctor tells you to. · Put ice or a cold pack on the sore area for 10 to 20 minutes at a time. Put a thin cloth between the ice and your skin. · If there is no swelling, you can put moist heat, a heating pad, or a warm cloth on your shoulder. Some doctors suggest alternating between hot and cold. · Rest your shoulder for a few days. If your doctor recommends it, you can then begin gentle exercise of the shoulder, but do not lift anything heavy. When should you call for help?   Call 911 anytime you think you may need emergency care. For example, call if:  · You have chest pain or pressure. This may occur with:  ¨ Sweating. ¨ Shortness of breath. ¨ Nausea or vomiting. ¨ Pain that spreads from the chest to the neck, jaw, or one or both shoulders or arms. ¨ Dizziness or lightheadedness. ¨ A fast or uneven pulse. After calling 911, chew 1 adult-strength aspirin. Wait for an ambulance. Do not try to drive yourself. · Your arm or hand is cool or pale or changes color. Call your doctor now or seek immediate medical care if:  · You have signs of infection, such as:  ¨ Increased pain, swelling, warmth, or redness in your shoulder. ¨ Red streaks leading from a place on your shoulder. ¨ Pus draining from an area of your shoulder. ¨ Swollen lymph nodes in your neck, armpits, or groin. ¨ A fever. Watch closely for changes in your health, and be sure to contact your doctor if:  · You cannot use your shoulder. · Your shoulder does not get better as expected. Where can you learn more? Go to http://nickolas-selvin.info/. Enter D623 in the search box to learn more about \"Shoulder Pain: Care Instructions. \"  Current as of: May 23, 2016  Content Version: 11.2  © 3014-1092 [employer]. Care instructions adapted under license by [employer] (which disclaims liability or warranty for this information). If you have questions about a medical condition or this instruction, always ask your healthcare professional. [place] [address] any warranty or liability for your use of this information.

## 2017-05-10 NOTE — PROGRESS NOTES
HISTORY OF PRESENT ILLNESS  Carlota Garcia is a 74 y.o. female. HPI Comments: 75 yo female here for f/u of DM, HTN, L shoulder pain x 1 month. No injury or trauma. Pain is increased when she sleeps on R shoulder. Notes pain with abduction; gets a little better after she has been up. No weakness. DM: Due for A1c. Restarted metformin last visit. HTN is controlled. No CP, SOB. HM: Due for hep C screening, TDap. Hypertension    Pertinent negatives include no chest pain, no palpitations, no blurred vision, no headaches, no dizziness, no shortness of breath, no nausea and no vomiting. Diabetes   Pertinent negatives include no chest pain, no headaches and no shortness of breath. Review of Systems   Constitutional: Negative for chills, fever and weight loss. HENT: Negative for congestion. Eyes: Negative for blurred vision and pain. Respiratory: Negative for cough and shortness of breath. Cardiovascular: Negative for chest pain, palpitations and leg swelling. Gastrointestinal: Negative for heartburn, nausea and vomiting. Genitourinary: Negative for frequency and urgency. Musculoskeletal: Positive for joint pain. Negative for falls and myalgias. Skin: Negative for itching and rash. Neurological: Negative for dizziness, tingling and headaches. Psychiatric/Behavioral: Negative for depression. The patient is not nervous/anxious.       Past Medical History:   Diagnosis Date    Atrial fibrillation     CHADS score 2  (-CHF, +HTN, -AGE, +DM, -CVA)    Bilateral cataracts     Colon polyps     7/14  repeat 5 yrs - Dr. Claudean Gate    Diabetes     Dyslipidemia     Electronic cigarette use     Graves disease     5/14 Grave's disease, Dr. Lorna Ng    Hypertension     Multiple thyroid nodules     5/14    Osteoporosis     last dexa 12/14    Plantar fasciitis, bilateral     Dr. Asha Hsieh Vitamin D deficiency      Current Outpatient Prescriptions on File Prior to Visit   Medication Sig Dispense Refill    metFORMIN (GLUCOPHAGE) 500 mg tablet Take 1 Tab by mouth two (2) times daily (with meals). 180 Tab 3    apixaban (ELIQUIS) 5 mg tablet Take 1 Tab by mouth two (2) times a day. 60 Tab 11    cholecalciferol, VITAMIN D3, (VITAMIN D3) 5,000 unit tab tablet Take 5,000 Units by mouth daily.  ramipril (ALTACE) 2.5 mg capsule Take 1 Cap by mouth daily. 90 Cap 3    cetirizine (ZYRTEC) 10 mg tablet Take  by mouth daily.  methimazole (TAPAZOLE) 10 mg tablet Take 5 mg by mouth every Monday, Wednesday, Friday.  triamcinolone (NASACORT) 55 mcg nasal inhaler 1 [place] by Both Nostrils route daily as needed.  omega 3-dha-epa-fish oil 100-160-1,000 mg cap Take 1,000 mg by mouth two (2) times a day.  multivitamin (ONE A DAY) tablet Take 1 Tab by mouth daily.  acetaminophen (TYLENOL) 325 mg tablet Take  by mouth every four (4) hours as needed for Pain. No current facility-administered medications on file prior to visit. Social History   Substance Use Topics    Smoking status: Former Smoker     Packs/day: 0.50     Years: 15.00     Types: Cigarettes     Quit date: 2014    Smokeless tobacco: Never Used      Comment: electronic cigarettes    Alcohol use 1.8 oz/week     1 Glasses of wine, 1 Cans of beer, 1 Shots of liquor per week      Comment: occasionally     Physical Exam   Constitutional: She appears well-developed and well-nourished. No distress. /73 (BP 1 Location: Left arm, BP Patient Position: Sitting)  Pulse (!) 59  Temp 98.4 °F (36.9 °C) (Oral)   Resp 20  Ht 5' 3\" (1.6 m)  Wt 177 lb 9.6 oz (80.6 kg)  SpO2 96%  BMI 31.46 kg/m2     Eyes: EOM are normal. Right eye exhibits no discharge. Left eye exhibits no discharge. No scleral icterus. Cardiovascular: Normal rate, regular rhythm and normal heart sounds. Exam reveals no gallop and no friction rub. No murmur heard. Pulmonary/Chest: Effort normal and breath sounds normal. No respiratory distress.  She has no wheezes. She has no rales. Musculoskeletal: She exhibits no edema or tenderness. Left shoulder: She exhibits pain. She exhibits normal range of motion (though pain with abduction), no deformity and normal strength. Neurological: She is alert. She exhibits normal muscle tone. Skin: Skin is warm and dry. Psychiatric: She has a normal mood and affect. Lab Results   Component Value Date/Time    Sodium 142 10/07/2016 09:56 AM    Potassium 4.0 10/07/2016 09:56 AM    Chloride 107 10/07/2016 09:56 AM    CO2 24 10/07/2016 09:56 AM    Anion gap 11 10/07/2016 09:56 AM    Glucose 162 10/07/2016 09:56 AM    BUN 13 10/07/2016 09:56 AM    Creatinine 0.75 10/07/2016 09:56 AM    BUN/Creatinine ratio 17 10/07/2016 09:56 AM    GFR est AA >60 10/07/2016 09:56 AM    GFR est non-AA >60 10/07/2016 09:56 AM    Calcium 9.1 10/07/2016 09:56 AM    Bilirubin, total 0.6 09/02/2016 08:12 AM    AST (SGOT) 26 09/02/2016 08:12 AM    Alk. phosphatase 68 09/02/2016 08:12 AM    Protein, total 6.7 09/02/2016 08:12 AM    Albumin 3.8 09/02/2016 08:12 AM    Globulin 2.9 09/02/2016 08:12 AM    A-G Ratio 1.3 09/02/2016 08:12 AM    ALT (SGPT) 53 09/02/2016 08:12 AM     ASSESSMENT and PLAN    ICD-10-CM ICD-9-CM    1. Type 2 diabetes mellitus without complication, without long-term current use of insulin (HCC) E11.9 250.00 HEMOGLOBIN A1C WITH EAG   2. Hypertension I11.9 402.10    3. Acute pain of left shoulder M25.512 719.41    4. Encounter for immunization Z23 V03.89 TETANUS, DIPHTHERIA TOXOIDS AND ACELLULAR PERTUSSIS VACCINE (TDAP), IN INDIVIDS. >=7, IM   5. Need for hepatitis C screening test Z11.59 V73.89 HEPATITIS C AB   Repeat labs today for A1c. Hep C screening done. BP stable. Will try naprosyn for shoulder pain, possible tendonitis.

## 2017-05-11 LAB
HCV AB SER IA-ACNC: 0.03 INDEX
HCV AB SERPL QL IA: NEGATIVE
HCV COMMENT,HCGAC: NORMAL

## 2017-05-17 RX ORDER — PROPRANOLOL HYDROCHLORIDE 10 MG/1
20 TABLET ORAL 2 TIMES DAILY
Qty: 360 TAB | Refills: 3 | Status: SHIPPED | OUTPATIENT
Start: 2017-05-17 | End: 2018-03-12 | Stop reason: SDUPTHER

## 2017-05-17 NOTE — TELEPHONE ENCOUNTER
From: Thania River  To: Tatyana Hightower MD  Sent: 5/17/2017 9:10 AM EDT  Subject: Medication Renewal Request    Original authorizing provider: MD Thania Acosta would like a refill of the following medications:  propranolol (INDERAL) 10 mg tablet Tatyana Hightower MD]    Preferred pharmacy: [place] [id number]    Comment:  The refill from 5/10/2017 that was sent to [facility] was only for 4 days. For this med, I take 2 tabs twice a day.

## 2017-08-14 ENCOUNTER — OFFICE VISIT (OUTPATIENT)
Dept: INTERNAL MEDICINE CLINIC | Age: 67
End: 2017-08-14

## 2017-08-14 VITALS
HEART RATE: 63 BPM | OXYGEN SATURATION: 97 % | TEMPERATURE: 98.3 F | WEIGHT: 181.2 LBS | RESPIRATION RATE: 16 BRPM | SYSTOLIC BLOOD PRESSURE: 142 MMHG | DIASTOLIC BLOOD PRESSURE: 68 MMHG | HEIGHT: 63 IN | BODY MASS INDEX: 32.11 KG/M2

## 2017-08-14 DIAGNOSIS — E66.9 OBESITY (BMI 30.0-34.9): ICD-10-CM

## 2017-08-14 DIAGNOSIS — E11.9 TYPE 2 DIABETES MELLITUS WITHOUT COMPLICATION, WITHOUT LONG-TERM CURRENT USE OF INSULIN (HCC): Primary | ICD-10-CM

## 2017-08-14 LAB — HBA1C MFR BLD HPLC: 6.7 %

## 2017-08-14 NOTE — PROGRESS NOTES
HISTORY OF PRESENT ILLNESS  Jasson Grande is a 74 y.o. female. HPI Comments: 73 yo female here for f/u of DM. She notes she has gained some weight. States she is having trouble always feeling hungry. H/o thyroid dz but recent TFTs stable. HTN remains controlled. Review of Systems   Constitutional: Negative for chills, fever and weight loss. HENT: Negative for congestion. Eyes: Negative for blurred vision and pain. Respiratory: Negative for cough and shortness of breath. Cardiovascular: Negative for chest pain, palpitations and leg swelling. Gastrointestinal: Negative for abdominal pain, blood in stool, heartburn, melena, nausea and vomiting. Skin: Negative for itching and rash. Neurological: Negative for dizziness, tingling and headaches. Psychiatric/Behavioral: Negative for depression. The patient is not nervous/anxious. Past Medical History:   Diagnosis Date    Atrial fibrillation     CHADS score 2  (-CHF, +HTN, -AGE, +DM, -CVA)    Bilateral cataracts     Colon polyps     7/14  repeat 5 yrs - Dr. Raimundo Vázquez    Diabetes     Dyslipidemia     Electronic cigarette use     Graves disease     5/14 Grave's disease, Dr. Yen Ghosh    Hypertension     Multiple thyroid nodules     5/14    Osteoporosis     last dexa 12/14    Plantar fasciitis, bilateral     Dr. Adrian Due Vitamin D deficiency      Current Outpatient Prescriptions on File Prior to Visit   Medication Sig Dispense Refill    propranolol (INDERAL) 10 mg tablet Take 2 Tabs by mouth two (2) times a day. 360 Tab 3    risedronate (ACTONEL) 35 mg tablet TAKE 1 TABLET EVERY 7 DAYS 12 Tab 3    atorvastatin (LIPITOR) 40 mg tablet TAKE 1 TABLET DAILY 90 Tab 2    naproxen (NAPROSYN) 500 mg tablet Take 1 Tab by mouth two (2) times daily (with meals). Indications: TENDONITIS 60 Tab 2    metFORMIN (GLUCOPHAGE) 500 mg tablet Take 1 Tab by mouth two (2) times daily (with meals).  180 Tab 3    apixaban (ELIQUIS) 5 mg tablet Take 1 Tab by mouth two (2) times a day. 60 Tab 11    cholecalciferol, VITAMIN D3, (VITAMIN D3) 5,000 unit tab tablet Take 5,000 Units by mouth daily.  ramipril (ALTACE) 2.5 mg capsule Take 1 Cap by mouth daily. 90 Cap 3    cetirizine (ZYRTEC) 10 mg tablet Take  by mouth daily.  methimazole (TAPAZOLE) 10 mg tablet Take 5 mg by mouth every Monday, Wednesday, Friday.  triamcinolone (NASACORT) 55 mcg nasal inhaler 1 [place] by Both Nostrils route daily as needed.  omega 3-dha-epa-fish oil 100-160-1,000 mg cap Take 1,000 mg by mouth two (2) times a day.  multivitamin (ONE A DAY) tablet Take 1 Tab by mouth daily.  acetaminophen (TYLENOL) 325 mg tablet Take  by mouth every four (4) hours as needed for Pain. No current facility-administered medications on file prior to visit. Social History   Substance Use Topics    Smoking status: Former Smoker     Packs/day: 0.50     Years: 15.00     Types: Cigarettes     Quit date: 2014    Smokeless tobacco: Never Used      Comment: electronic cigarettes    Alcohol use 1.8 oz/week     1 Glasses of wine, 1 Cans of beer, 1 Shots of liquor per week      Comment: occasionally     Physical Exam   Constitutional: She appears well-developed and well-nourished. No distress. /68 (BP 1 Location: Left arm, BP Patient Position: Sitting)  Pulse 63  Temp 98.3 °F (36.8 °C) (Oral)   Resp 16  Ht 5' 3\" (1.6 m)  Wt 181 lb 3.2 oz (82.2 kg)  SpO2 97%  BMI 32.1 kg/m2     Eyes: EOM are normal. Right eye exhibits no discharge. Left eye exhibits no discharge. No scleral icterus. Neck: Neck supple. Cardiovascular: Normal rate, regular rhythm and normal heart sounds. Exam reveals no gallop and no friction rub. No murmur heard. Pulmonary/Chest: Effort normal and breath sounds normal. No respiratory distress. She has no wheezes. She has no rales. Musculoskeletal: She exhibits no edema or tenderness. Lymphadenopathy:     She has no cervical adenopathy. Neurological: She is alert. She exhibits normal muscle tone. Skin: Skin is warm and dry. Psychiatric: She has a normal mood and affect. Lab Results   Component Value Date/Time    Sodium 142 10/07/2016 09:56 AM    Potassium 4.0 10/07/2016 09:56 AM    Chloride 107 10/07/2016 09:56 AM    CO2 24 10/07/2016 09:56 AM    Anion gap 11 10/07/2016 09:56 AM    Glucose 162 10/07/2016 09:56 AM    BUN 13 10/07/2016 09:56 AM    Creatinine 0.75 10/07/2016 09:56 AM    BUN/Creatinine ratio 17 10/07/2016 09:56 AM    GFR est AA >60 10/07/2016 09:56 AM    GFR est non-AA >60 10/07/2016 09:56 AM    Calcium 9.1 10/07/2016 09:56 AM    Bilirubin, total 0.6 09/02/2016 08:12 AM    AST (SGOT) 26 09/02/2016 08:12 AM    Alk. phosphatase 68 09/02/2016 08:12 AM    Protein, total 6.7 09/02/2016 08:12 AM    Albumin 3.8 09/02/2016 08:12 AM    Globulin 2.9 09/02/2016 08:12 AM    A-G Ratio 1.3 09/02/2016 08:12 AM    ALT (SGPT) 53 09/02/2016 08:12 AM     Lab Results   Component Value Date/Time    Cholesterol, total 142 10/07/2016 09:56 AM    HDL Cholesterol 59 10/07/2016 09:56 AM    LDL, calculated 38 10/07/2016 09:56 AM    VLDL, calculated 45 10/07/2016 09:56 AM    Triglyceride 225 10/07/2016 09:56 AM    CHOL/HDL Ratio 2.4 10/07/2016 09:56 AM     Lab Results   Component Value Date/Time    Hemoglobin A1c 6.6 05/10/2017 12:28 PM     Lab Results   Component Value Date/Time    Microalbumin/Creat ratio (mg/g creat) 56 01/10/2017 01:21 PM    Microalbumin,urine random 0.80 01/10/2017 01:21 PM     Lab Results   Component Value Date/Time    TSH 2.050 06/01/2015 09:50 AM       ASSESSMENT and PLAN    ICD-10-CM ICD-9-CM    1. Type 2 diabetes mellitus without complication, without long-term current use of insulin (HCC) E11.9 250.00 AMB POC HEMOGLOBIN A1C      REFERRAL TO DIETITIAN   2. Obesity (BMI 30.0-34. 9) E66.9 278.00 REFERRAL TO DIETITIAN   A1c remains controlled. Discussed diet.  Will hold on appetite suppressant and refer to dietician for further input on diet.

## 2017-08-14 NOTE — PATIENT INSTRUCTIONS
Phentermine (By mouth)   Phentermine (FEN-ter-meen)  Helps you lose weight when used for a short time. Brand Name(s): Adipex-P, Lomaira   There may be other brand names for this medicine. When This Medicine Should Not Be Used: This medicine is not right for everyone. Do not use it if you had an allergic reaction to phentermine or similar medicines, or if you are pregnant. How to Use This Medicine:   Dissolving Tablet, Capsule, Long Acting Capsule, Tablet, Dissolving Tablet  · Your doctor will tell you how much medicine to use. Do not use more than directed. · This medicine is not for long-term use. · To avoid trouble sleeping, always take this medicine in the morning and never at bedtime or late in the evening. ¨ Take the capsule 2 hours after breakfast.  ¨ Take the extended-release capsule before breakfast.  ¨ Take the disintegrating tablet in the morning, with or without food. ¨ Take the phentermine tablet before breakfast or 1 to 2 hours after breakfast.  ¨ Take Lomaira tablet 30 minutes before meals. · Swallow the extended-release capsule whole. Do not crush, break, or chew it. · If you are using the disintegrating tablet, make sure your hands are dry before you handle the tablet. Place the tablet on your tongue. It should melt quickly. After the tablet has melted, swallow or take a drink of water. · Tablet: Swallow whole. Do not crush, break, or chew it. · Carefully follow your doctor's instructions about any special diet. · Missed dose: Take a dose as soon as you remember. If it is almost time for your next dose, wait until then and take a regular dose. Do not take extra medicine to make up for a missed dose. · Store the medicine in a closed container at room temperature, away from heat, moisture, and direct light. Drugs and Foods to Avoid:   Ask your doctor or pharmacist before using any other medicine, including over-the-counter medicines, vitamins, and herbal products.   · Do not use this medicine and an MAO inhibitor (MAOI) within 14 days of each other. · Some medicines can affect how phentermine works. Tell your doctor if you are using any of the following:  ¨ Amphetamine medicine (including dextroamphetamine, methamphetamine)  ¨ Diet pills  ¨ Insulin or diabetes medicine  ¨ Medicine to treat depression (including fluoxetine, fluvoxamine, paroxetine, sertraline)  · Do not drink alcohol while you are using this medicine. Warnings While Using This Medicine:   · It is not safe to take this medicine during pregnancy. It could harm an unborn baby. Tell your doctor right away if you become pregnant. · Tell your doctor if you are breastfeeding, or if you have kidney disease, diabetes, glaucoma, congestive heart failure, heart or blood vessel disease, high blood pressure, overactive thyroid, or a history of stroke or drug abuse. Tell your doctor if have allergies to aspirin or tartrazine. · This medicine may cause the following problems:  ¨ Primary pulmonary hypertension (a serious lung problem)  ¨ Heart valve disease  ¨ Changes in blood sugar levels  · This medicine may make you dizzy or drowsy. Do not drive or do anything else that could be dangerous until you know how this medicine affects you. · This medicine can be habit-forming. Do not use more than your prescribed dose. Call your doctor if you think your medicine is not working. · Do not stop using this medicine suddenly. Your doctor will need to slowly decrease your dose before you stop it completely. · Your doctor will check your progress and the effects of this medicine at regular visits. Keep all appointments. · Keep all medicine out of the reach of children. Never share your medicine with anyone.   Possible Side Effects While Using This Medicine:   Call your doctor right away if you notice any of these side effects:  · Allergic reaction: Itching or hives, swelling in your face or hands, swelling or tingling in your mouth or throat, chest tightness, trouble breathing  · Chest pain, fainting, trouble breathing  · Fast, slow, pounding, or uneven heartbeat  · Seizures or tremors  · Severe headache  · Swelling of your feet or lower legs  If you notice these less serious side effects, talk with your doctor:   · Changes in sex drive  · Dizziness, drowsiness, mild headache  · Dry mouth or a bad taste in your mouth  · Nausea, vomiting, diarrhea, constipation, stomach cramps  · Restlessness or nervousness, trouble sleeping  If you notice other side effects that you think are caused by this medicine, tell your doctor. Call your doctor for medical advice about side effects. You may report side effects to FDA at 1-457-FDA-0047  © 2017 [facility] Information is for End User's use only and may not be sold, redistributed or otherwise used for commercial purposes. The above information is an  only. It is not intended as medical advice for individual conditions or treatments. Talk to your doctor, nurse or pharmacist before following any medical regimen to see if it is safe and effective for you.

## 2017-08-14 NOTE — MR AVS SNAPSHOT
Visit Information Date & Time Provider Department Dept. Phone Encounter #  
 8/14/2017  9:15 AM [address] [phone] [id number] Follow-up Instructions Return in about 3 months (around 11/14/2017). Your Appointments 12/28/2017  9:45 AM  
Follow Up with Diego Washburn MD  
Cardio Specialist at [facility] Appt Note: 9 month follow up  
 [facility] [address] [address] Upcoming Health Maintenance Date Due  
 EYE EXAM RETINAL OR DILATED Q1 11/18/1960 GLAUCOMA SCREENING Q2Y 8/1/2016 MEDICARE YEARLY EXAM 12/4/2016 INFLUENZA AGE 9 TO ADULT 9/15/2017* LIPID PANEL Q1 10/7/2017 HEMOGLOBIN A1C Q6M 11/10/2017 FOOT EXAM Q1 1/10/2018 MICROALBUMIN Q1 1/10/2018 BREAST CANCER SCRN MAMMOGRAM 3/14/2018 COLONOSCOPY 7/7/2019 DTaP/Tdap/Td series (2 - Td) 5/10/2027 *Topic was postponed. The date shown is not the original due date. Allergies as of 8/14/2017  Review Complete On: 8/14/2017 By: Everett Harding LPN Severity Noted Reaction Type Reactions Sulfa (Sulfonamide Antibiotics)  05/20/2014   Intolerance Other (comments) Renal failure Current Immunizations  Reviewed on 1/10/2017 Name Date Influenza Vaccine 10/1/2013 Influenza Vaccine Helio [place]) 10/6/2016 Pneumococcal Conjugate (PCV-13) 1/10/2017 Pneumococcal Polysaccharide (PPSV-23) 5/20/2014 Tdap 5/10/2017 Not reviewed this visit You Were Diagnosed With   
  
 Codes Comments Type 2 diabetes mellitus without complication, without long-term current use of insulin (HCC)    -  Primary ICD-10-CM: E11.9 ICD-9-CM: 250.00 Obesity (BMI 30.0-34.9)     ICD-10-CM: T06.5 ICD-9-CM: 278.00 Vitals BP Pulse Temp Resp Height(growth percentile) Weight(growth percentile) 142/68 (BP 1 Location: Left arm, BP Patient Position: Sitting) 63 98.3 °F (36.8 °C) (Oral) 16 5' 3\" (1.6 m) 181 lb 3.2 oz (82.2 kg) SpO2 BMI OB Status Smoking Status 97% 32.1 kg/m2 Postmenopausal Former Smoker Vitals History BMI and BSA Data Body Mass Index Body Surface Area  
 32.1 kg/m 2 1.91 m 2 Preferred Pharmacy Pharmacy Name Phone  [address] [phone] Your Updated Medication List  
  
   
This list is accurate as of: 8/14/17 10:30 AM.  Always use your most recent med list.  
  
  
  
  
 apixaban 5 mg tablet Commonly known as:  Obadiah Severe Take 1 Tab by mouth two (2) times a day. atorvastatin 40 mg tablet Commonly known as:  LIPITOR  
TAKE 1 TABLET DAILY  
  
 cetirizine 10 mg tablet Commonly known as:  ZYRTEC Take  by mouth daily. cholecalciferol (VITAMIN D3) 5,000 unit Tab tablet Commonly known as:  VITAMIN D3 Take 5,000 Units by mouth daily. metFORMIN 500 mg tablet Commonly known as:  GLUCOPHAGE Take 1 Tab by mouth two (2) times daily (with meals). methIMAzole 10 mg tablet Commonly known as:  TAPAZOLE Take 5 mg by mouth every Monday, Wednesday, Friday. multivitamin tablet Commonly known as:  ONE A DAY Take 1 Tab by mouth daily. naproxen 500 mg tablet Commonly known as:  NAPROSYN Take 1 Tab by mouth two (2) times daily (with meals). Indications: TENDONITIS  
  
 NASACORT 55 mcg nasal inhaler Generic drug:  triamcinolone 1 [place] by Both Nostrils route daily as needed. omega 3-dha-epa-fish oil 100-160-1,000 mg Cap Take 1,000 mg by mouth two (2) times a day. propranolol 10 mg tablet Commonly known as:  INDERAL Take 2 Tabs by mouth two (2) times a day. ramipril 2.5 mg capsule Commonly known as:  ALTACE Take 1 Cap by mouth daily. risedronate 35 mg tablet Commonly known as:  ACTONEL  
TAKE 1 TABLET EVERY 7 DAYS TYLENOL 325 mg tablet Generic drug:  acetaminophen Take  by mouth every four (4) hours as needed for Pain. We Performed the Following AMB POC HEMOGLOBIN A1C [61013 CPT(R)] REFERRAL TO DIETITIAN [NCS01 Custom] Comments:  
 Please evaluate patient for DM, difficulty with meal planning and controlling appetite as she is feeling hungry after eating. Follow-up Instructions Return in about 3 months (around 11/14/2017). Referral Information Referral ID Referred By Referred To  
  
 [id number] JERSON HERNANDE[facility] PT-DE[facility]   
   [address] [address] [address] Phone: [phone] Visits Status Start Date End Date 1 New Request 8/14/17 8/14/18 If your referral has a status of pending review or denied, additional information will be sent to support the outcome of this decision. Patient Instructions Phentermine (By mouth) Phentermine (FEN-ter-meen) Helps you lose weight when used for a short time. Brand Name(s): Richie Carter There may be other brand names for this medicine. When This Medicine Should Not Be Used: This medicine is not right for everyone. Do not use it if you had an allergic reaction to phentermine or similar medicines, or if you are pregnant. How to Use This Medicine:  
Dissolving Tablet, Capsule, Long Acting Capsule, Tablet, Dissolving Tablet · Your doctor will tell you how much medicine to use. Do not use more than directed. · This medicine is not for long-term use. · To avoid trouble sleeping, always take this medicine in the morning and never at bedtime or late in the evening. ¨ Take the capsule 2 hours after breakfast. 
¨ Take the extended-release capsule before breakfast. 
¨ Take the disintegrating tablet in the morning, with or without food. ¨ Take the phentermine tablet before breakfast or 1 to 2 hours after breakfast. 
¨ Take Lomaira tablet 30 minutes before meals. · Swallow the extended-release capsule whole. Do not crush, break, or chew it. · If you are using the disintegrating tablet, make sure your hands are dry before you handle the tablet. Place the tablet on your tongue. It should melt quickly. After the tablet has melted, swallow or take a drink of water. · Tablet: Swallow whole. Do not crush, break, or chew it. · Carefully follow your doctor's instructions about any special diet. · Missed dose: Take a dose as soon as you remember. If it is almost time for your next dose, wait until then and take a regular dose. Do not take extra medicine to make up for a missed dose. · Store the medicine in a closed container at room temperature, away from heat, moisture, and direct light. Drugs and Foods to Avoid: Ask your doctor or pharmacist before using any other medicine, including over-the-counter medicines, vitamins, and herbal products. · Do not use this medicine and an MAO inhibitor (MAOI) within 14 days of each other. · Some medicines can affect how phentermine works. Tell your doctor if you are using any of the following: ¨ Amphetamine medicine (including dextroamphetamine, methamphetamine) ¨ Diet pills ¨ Insulin or diabetes medicine ¨ Medicine to treat depression (including fluoxetine, fluvoxamine, paroxetine, sertraline) · Do not drink alcohol while you are using this medicine. Warnings While Using This Medicine: · It is not safe to take this medicine during pregnancy. It could harm an unborn baby. Tell your doctor right away if you become pregnant. · Tell your doctor if you are breastfeeding, or if you have kidney disease, diabetes, glaucoma, congestive heart failure, heart or blood vessel disease, high blood pressure, overactive thyroid, or a history of stroke or drug abuse. Tell your doctor if have allergies to aspirin or tartrazine. · This medicine may cause the following problems: 
¨ Primary pulmonary hypertension (a serious lung problem) ¨ Heart valve disease ¨ Changes in blood sugar levels · This medicine may make you dizzy or drowsy. Do not drive or do anything else that could be dangerous until you know how this medicine affects you. · This medicine can be habit-forming. Do not use more than your prescribed dose. Call your doctor if you think your medicine is not working. · Do not stop using this medicine suddenly. Your doctor will need to slowly decrease your dose before you stop it completely. · Your doctor will check your progress and the effects of this medicine at regular visits. Keep all appointments. · Keep all medicine out of the reach of children. Never share your medicine with anyone. Possible Side Effects While Using This Medicine:  
Call your doctor right away if you notice any of these side effects: · Allergic reaction: Itching or hives, swelling in your face or hands, swelling or tingling in your mouth or throat, chest tightness, trouble breathing · Chest pain, fainting, trouble breathing · Fast, slow, pounding, or uneven heartbeat · Seizures or tremors · Severe headache · Swelling of your feet or lower legs If you notice these less serious side effects, talk with your doctor: · Changes in sex drive · Dizziness, drowsiness, mild headache · Dry mouth or a bad taste in your mouth · Nausea, vomiting, diarrhea, constipation, stomach cramps · Restlessness or nervousness, trouble sleeping If you notice other side effects that you think are caused by this medicine, tell your doctor. Call your doctor for medical advice about side effects. You may report side effects to FDA at 1-162-FDA-8808 © 2017 [address] Information is for End User's use only and may not be sold, redistributed or otherwise used for commercial purposes. The above information is an  only. It is not intended as medical advice for individual conditions or treatments.  Talk to your doctor, nurse or pharmacist before following any medical regimen to see if it is safe and effective for you. Introducing [facility]! Dear Mitali Merino: Thank you for requesting a "[employer]" account. Our records indicate that you already have an active "[employer]" account. You can access your account anytime at [email or web address] Did you know that you can access your hospital and ER discharge instructions at any time in "[employer]"? You can also review all of your test results from your hospital stay or ER visit. Additional Information If you have questions, please visit the Frequently Asked Questions section of the "[employer]" website at [email or web address]. Remember, "[employer]" is NOT to be used for urgent needs. For medical emergencies, dial 911. Now available from your iPhone and Android! Please provide this summary of care documentation to your next provider. Your primary care clinician is listed as Idalia Galindo. If you have any questions after today's visit, please call [phone].

## 2017-08-31 ENCOUNTER — HOSPITAL ENCOUNTER (OUTPATIENT)
Dept: NUTRITION | Age: 67
Discharge: HOME | End: 2017-08-31
Payer: COMMERCIAL

## 2017-08-31 PROCEDURE — 97802 MEDICAL NUTRITION INDIV IN: CPT

## 2017-08-31 NOTE — PROGRESS NOTES
[employer] [facility] - Outpatient Nutrition Services  [address] [facility], [place], [address]  Phone: [phone]  Fax: [phone]   Nutrition Assessment - Medical Nutrition Therapy   Outpatient Initial Evaluation         Patient Name: Theron Jones : 1950   Treatment Diagnosis: DM, Obesity Onset Date: 6 mo-1yr ago   Referral Source: Williams Koo MD [facility]): 2017     Ht:  63 in  cm Wt: 181  lb  kg   BMI: 32.1  BMR   male  BMR female 1320     PMHx includes: T2DM, HTN, HLD, Osteoporosis, Hyperthyroidism, arthritis     Medications of Nutritional Significance:   BP med, osteoporosis med, Lipitor, Metformin, Vit D3, Omega 3, MVI, Tapazole     Subjective/Assessment:   73 yo female referred by Dr. Gerard Gamez for help with balanced eating, DM, and appetite regulation. Pt reports feeling very hungry for about the past 3 months, as well as, craving sweets which is out of her norm. Some ideas about the cause of hunger are whether she is eating enough to meet her caloric needs, if she is experiencing lower blood sugars leading to craving of sweets, or if her thyroid condition is changing. Discussed strategies to improve balance in diet, meet caloric needs, monitor hunger/fullness signals, and control blood glucose. Pt is motivated to make changes and to decrease cravings/hunger signals; verbalized understanding recommendations; Compliance is ancticpated.      Current Eating Patterns: 8am B: 1 c. Cereal with whole milk  10a S: hungry, but only allows herself to have water  12p L: [facility] sandwich on honey oat bread, milk  3pm S: has been craving sweets lately  7pm D: Baked Chicken and vegetable     Handouts/  Information Provided: [x]  Carbohydrates  [x]  Protein  []  Fiber  []  Serving Sizes  [x]  Meal and Snack Ideas  []  Food Journals [x]  Diabetes  []  Cholesterol  []  Sodium  []  Gen Nutr Guidelines  []  SBGM Guidelines  [x]  Others: non-starchy veg   Estimate Needs:   Calories: 1200 Protein: 75 Carbs: 135 Fat: 40   Kcal/day  g/day  g/day  g/day        percent: 25  45  30     Nutritional Goal - To promote lifestyle changes to result in:    [x]  Weight loss  []  Improved diabetic control  []  Decreased cholesterol levels  []  Decreased blood pressure  []  Weight maintenance []  Preventing any interactions associated with food allergies  []  Adequate weight gain toward goal weight  [x]  Other: Find strategies to satisfy hunger signals and reduce sweet cravings. Recommendations: 1. Try to incorporate balance into meals and snacks. 2. Pair carbohydrates with protein whenever possible. 3. Set an exercise goal and start a new routine. 4. Educated pt on all carbohydrates found in foods and encouraged no more than 30-40 gm/meal and 10-20 gm/snack.      Information Reviewed with: pt   Potential Barriers to Learning: none     Dietitian Signature: Chelsi Miller MS, RD Date: 8/31/2017   Follow-up: Fri. 10/13/17 at 1:30pm Time: 3:48 PM

## 2017-09-14 RX ORDER — RAMIPRIL 2.5 MG/1
2.5 CAPSULE ORAL DAILY
Qty: 90 CAP | Refills: 3 | Status: SHIPPED | OUTPATIENT
Start: 2017-09-14 | End: 2018-08-17 | Stop reason: SDUPTHER

## 2017-09-14 NOTE — TELEPHONE ENCOUNTER
From: Pastor Carroll  To: Enedelia Sánchez MD  Sent: 9/14/2017 12:51 PM EDT  Subject: Medication Renewal Request    Original authorizing provider: MD Tiffanie Trimble would like a refill of the following medications:  ramipril (ALTACE) 2.5 mg capsule Enedelia Sánchez MD]    [address]    Comment:

## 2017-10-13 ENCOUNTER — HOSPITAL ENCOUNTER (OUTPATIENT)
Dept: NUTRITION | Age: 67
Discharge: HOME | End: 2017-10-13
Payer: COMMERCIAL

## 2017-10-13 PROCEDURE — 97803 MED NUTRITION INDIV SUBSEQ: CPT

## 2017-10-13 NOTE — PROGRESS NOTES
NUTRITION - FOLLOW-UP TREATMENT NOTE  Patient Name: Lubna Griffin         Date: 10/13/2017  : 1950    YES Patient  Verified  Diagnosis: In time:   1:30p          Out time:   2p   Total Treatment Time (min):   30     SUBJECTIVE/ASSESSMENT    Changes in medication or medical history? Any new allergies, surgeries or procedures? NO    If yes, update Summary List   Pt in for follow up doing better; she reports not feeling as hungry as she had been previously and not craving so much sweets. It seems that pt was probably not consuming enough calories to support metabolic needs. She has added in a couple snacks and balance in her diet has improved; will start to work on timing. Current Wt: Did not weigh Previous Wt: 181 Wt Change: n/a     Achievement of Goals: 1. Try to incorporate balance into meals and snacks. =met, continue  2. Pair carbohydrates with protein whenever possible. =met, continue  3. Set an exercise goal and start a new routine. =met, continue  4. Educated pt on all carbohydrates found in foods and encouraged no more than 30-40 gm/meal and 10-20 gm/snack. =met, continue         Patient Education:  [x]  Review current plan with patient   []  Other:    Handouts/  Information Provided: []  Carbohydrates  []  Protein  []  Fiber  []  Serving Sizes  []  Fluids  []  General guidelines []  Diabetes  []  Cholesterol  []  Sodium  []  SBGM  []  Food Journals  []  Others:      New Patient Goals: 1. Try to have something to eat within 2 hours of waking up. 2. Avoid going longer than 5 hours without eating. If you know you will, use a snack to prevent feelings of extreme hunger. 3. [place] walking for 15 minutes, 2-4x/week.       PLAN    [x]  Continue on current plan []  Follow-up PRN   []  Discharge due to :    [x]  Next appt: Fri. 12/15/17 at 1:30pm     Dietitian: Warner Blunt MS, RD    Date: 10/13/2017 Time: 2:14 PM

## 2017-11-06 ENCOUNTER — HOSPITAL ENCOUNTER (OUTPATIENT)
Dept: LAB | Age: 67
Discharge: HOME | End: 2017-11-06
Payer: COMMERCIAL

## 2017-11-06 LAB
T4 FREE SERPL-MCNC: 1 NG/DL (ref 0.7–1.5)
TSH SERPL DL<=0.05 MIU/L-ACNC: 2.1 UIU/ML (ref 0.36–3.74)

## 2017-11-06 PROCEDURE — 84443 ASSAY THYROID STIM HORMONE: CPT | Performed by: INTERNAL MEDICINE

## 2017-11-06 PROCEDURE — 36415 COLL VENOUS BLD VENIPUNCTURE: CPT | Performed by: INTERNAL MEDICINE

## 2017-11-06 PROCEDURE — 84439 ASSAY OF FREE THYROXINE: CPT | Performed by: INTERNAL MEDICINE

## 2017-11-14 ENCOUNTER — OFFICE VISIT (OUTPATIENT)
Dept: INTERNAL MEDICINE CLINIC | Age: 67
End: 2017-11-14

## 2017-11-14 ENCOUNTER — HOSPITAL ENCOUNTER (OUTPATIENT)
Dept: LAB | Age: 67
Discharge: HOME | End: 2017-11-14
Payer: COMMERCIAL

## 2017-11-14 VITALS
HEART RATE: 62 BPM | HEIGHT: 63 IN | DIASTOLIC BLOOD PRESSURE: 57 MMHG | TEMPERATURE: 97.3 F | RESPIRATION RATE: 17 BRPM | WEIGHT: 187 LBS | BODY MASS INDEX: 33.13 KG/M2 | SYSTOLIC BLOOD PRESSURE: 139 MMHG | OXYGEN SATURATION: 98 %

## 2017-11-14 DIAGNOSIS — R09.81 SINUS CONGESTION: ICD-10-CM

## 2017-11-14 DIAGNOSIS — E11.9 TYPE 2 DIABETES MELLITUS WITHOUT COMPLICATION, WITHOUT LONG-TERM CURRENT USE OF INSULIN (HCC): ICD-10-CM

## 2017-11-14 DIAGNOSIS — E55.9 VITAMIN D DEFICIENCY: ICD-10-CM

## 2017-11-14 DIAGNOSIS — E11.9 TYPE 2 DIABETES MELLITUS WITHOUT COMPLICATION, WITHOUT LONG-TERM CURRENT USE OF INSULIN (HCC): Primary | ICD-10-CM

## 2017-11-14 DIAGNOSIS — I11.9 BENIGN HYPERTENSIVE HEART DISEASE WITHOUT HEART FAILURE: ICD-10-CM

## 2017-11-14 LAB
ALBUMIN SERPL-MCNC: 4 G/DL (ref 3.4–5)
ALBUMIN/GLOB SERPL: 1.3 {RATIO} (ref 0.8–1.7)
ALP SERPL-CCNC: 66 U/L (ref 45–117)
ALT SERPL-CCNC: 51 U/L (ref 13–56)
ANION GAP SERPL CALC-SCNC: 9 MMOL/L (ref 3–18)
AST SERPL-CCNC: 22 U/L (ref 15–37)
BILIRUB SERPL-MCNC: 0.6 MG/DL (ref 0.2–1)
BUN SERPL-MCNC: 16 MG/DL (ref 7–18)
BUN/CREAT SERPL: 28 (ref 12–20)
CALCIUM SERPL-MCNC: 8.9 MG/DL (ref 8.5–10.1)
CHLORIDE SERPL-SCNC: 106 MMOL/L (ref 100–108)
CHOLEST SERPL-MCNC: 154 MG/DL
CO2 SERPL-SCNC: 25 MMOL/L (ref 21–32)
CREAT SERPL-MCNC: 0.58 MG/DL (ref 0.6–1.3)
ERYTHROCYTE [DISTWIDTH] IN BLOOD BY AUTOMATED COUNT: 13.2 % (ref 11.6–14.5)
EST. AVERAGE GLUCOSE BLD GHB EST-MCNC: 157 MG/DL
GLOBULIN SER CALC-MCNC: 3 G/DL (ref 2–4)
GLUCOSE SERPL-MCNC: 174 MG/DL (ref 74–99)
HBA1C MFR BLD: 7.1 % (ref 4.2–5.6)
HCT VFR BLD AUTO: 42.9 % (ref 35–45)
HDLC SERPL-MCNC: 55 MG/DL (ref 40–60)
HDLC SERPL: 2.8 {RATIO} (ref 0–5)
HGB BLD-MCNC: 14.3 G/DL (ref 12–16)
LDLC SERPL CALC-MCNC: 47.4 MG/DL (ref 0–100)
LIPID PROFILE,FLP: ABNORMAL
MCH RBC QN AUTO: 29.7 PG (ref 24–34)
MCHC RBC AUTO-ENTMCNC: 33.3 G/DL (ref 31–37)
MCV RBC AUTO: 89 FL (ref 74–97)
PLATELET # BLD AUTO: 275 K/UL (ref 135–420)
PMV BLD AUTO: 11 FL (ref 9.2–11.8)
POTASSIUM SERPL-SCNC: 4.1 MMOL/L (ref 3.5–5.5)
PROT SERPL-MCNC: 7 G/DL (ref 6.4–8.2)
RBC # BLD AUTO: 4.82 M/UL (ref 4.2–5.3)
SODIUM SERPL-SCNC: 140 MMOL/L (ref 136–145)
TRIGL SERPL-MCNC: 258 MG/DL (ref ?–150)
VLDLC SERPL CALC-MCNC: 51.6 MG/DL
WBC # BLD AUTO: 6.8 K/UL (ref 4.6–13.2)

## 2017-11-14 PROCEDURE — 82652 VIT D 1 25-DIHYDROXY: CPT | Performed by: INTERNAL MEDICINE

## 2017-11-14 PROCEDURE — 80061 LIPID PANEL: CPT | Performed by: INTERNAL MEDICINE

## 2017-11-14 PROCEDURE — 80053 COMPREHEN METABOLIC PANEL: CPT | Performed by: INTERNAL MEDICINE

## 2017-11-14 PROCEDURE — 83036 HEMOGLOBIN GLYCOSYLATED A1C: CPT | Performed by: INTERNAL MEDICINE

## 2017-11-14 PROCEDURE — 36415 COLL VENOUS BLD VENIPUNCTURE: CPT | Performed by: INTERNAL MEDICINE

## 2017-11-14 PROCEDURE — 85027 COMPLETE CBC AUTOMATED: CPT | Performed by: INTERNAL MEDICINE

## 2017-11-14 RX ORDER — MONTELUKAST SODIUM 10 MG/1
10 TABLET ORAL DAILY
Qty: 90 TAB | Refills: 3 | Status: ON HOLD | OUTPATIENT
Start: 2017-11-14 | End: 2018-10-29 | Stop reason: SDUPTHER

## 2017-11-14 NOTE — PATIENT INSTRUCTIONS
Saline Nasal Washes: Care Instructions  Your Care Instructions  Saline nasal washes help keep the nasal passages open by washing out thick or dried mucus. This simple remedy can help relieve symptoms of allergies, sinusitis, and colds. It also can make the nose feel more comfortable by keeping the mucous membranes moist. You may notice a little burning sensation in your nose the first few times you use the solution, but this usually gets better in a few days. Follow-up care is a key part of your treatment and safety. Be sure to make and go to all appointments, and call your doctor if you are having problems. It's also a good idea to know your test results and keep a list of the medicines you take. How can you care for yourself at home? · You can buy premixed saline solution in a squeeze bottle or other sinus rinse products at a drugstore. Read and follow the instructions on the label. · You also can make your own saline solution by adding 1 teaspoon of salt and 1 teaspoon of baking soda to 2 cups of distilled water. · If you use a homemade solution, pour a small amount into a clean bowl. Using a rubber bulb syringe, squeeze the syringe and place the tip in the salt water. Pull a small amount of the salt water into the syringe by relaxing your hand. · Sit down with your head tilted slightly back. Do not lie down. Put the tip of the bulb syringe or the squeeze bottle a little way into one of your nostrils. Gently drip or squirt a few drops into the nostril. Repeat with the other nostril. Some sneezing and gagging are normal at first.  · Gently blow your nose. · Wipe the syringe or bottle tip clean after each use. · Repeat this 2 or 3 times a day. · Use nasal washes gently if you have nosebleeds often. When should you call for help? Watch closely for changes in your health, and be sure to contact your doctor if:  ? · You often get nosebleeds. ? · You have problems doing the nasal washes.    Where can you learn more? Go to http://nickolas-selvin.info/. Enter [phone] in the search box to learn more about \"Saline Nasal Washes: Care Instructions. \"  Current as of: May 12, 2017  Content Version: 11.4  © 4209-3699 [employer], [employer]. Care instructions adapted under license by [employer] (which disclaims liability or warranty for this information). If you have questions about a medical condition or this instruction, always ask your healthcare professional. [address] any warranty or liability for your use of this information.

## 2017-11-14 NOTE — MR AVS SNAPSHOT
Visit Information Date & Time Provider Department Dept. Phone Encounter #  
 11/14/2017  8:00 AM [address] [phone] [id number] Follow-up Instructions Return in about 3 months (around 2/14/2018) for hypertension, diabetes. Your Appointments 12/28/2017  9:45 AM  
Follow Up with Diego Kate MD  
[facility] Appt Note: 9 month follow up  
 [facility] [address] Upcoming Health Maintenance Date Due  
 EYE EXAM RETINAL OR DILATED Q1 11/18/1960 GLAUCOMA SCREENING Q2Y 8/1/2016 MEDICARE YEARLY EXAM 12/4/2016 LIPID PANEL Q1 10/7/2017 BREAST CANCER SCRN MAMMOGRAM 3/14/2018 FOOT EXAM Q1 1/10/2018 MICROALBUMIN Q1 1/10/2018 HEMOGLOBIN A1C Q6M 2/14/2018 COLONOSCOPY 7/7/2019 DTaP/Tdap/Td series (2 - Td) 5/10/2027 Allergies as of 11/14/2017  Review Complete On: 11/14/2017 By: Stephanie Alfaro LPN Severity Noted Reaction Type Reactions Sulfa (Sulfonamide Antibiotics)  05/20/2014   Intolerance Other (comments) Renal failure Current Immunizations  Reviewed on 1/10/2017 Name Date Influenza Vaccine 10/1/2013 Influenza Vaccine Frieda Yulissa) 10/6/2016 Pneumococcal Conjugate (PCV-13) 1/10/2017 Pneumococcal Polysaccharide (PPSV-23) 5/20/2014 Tdap 5/10/2017 Not reviewed this visit You Were Diagnosed With   
  
 Codes Comments Type 2 diabetes mellitus without complication, without long-term current use of insulin (HCC)    -  Primary ICD-10-CM: E11.9 ICD-9-CM: 250.00 Benign hypertensive heart disease without heart failure     ICD-10-CM: I11.9 ICD-9-CM: 402.10 Sinus congestion     ICD-10-CM: R09.81 ICD-9-CM: 478.19 Vitamin D deficiency     ICD-10-CM: E55.9 ICD-9-CM: 268.9 BMI 33.0-33.9,adult     ICD-10-CM: X09.30 
ICD-9-CM: V85.33 Vitals BP Pulse Temp Resp Height(growth percentile) Weight(growth percentile) 139/57 62 97.3 °F (36.3 °C) (Oral) 17 5' 3\" (1.6 m) 187 lb (84.8 kg) SpO2 BMI OB Status Smoking Status 98% 33.13 kg/m2 Postmenopausal Former Smoker BMI and BSA Data Body Mass Index Body Surface Area  
 33.13 kg/m 2 1.94 m 2 Preferred Pharmacy Pharmacy Name Phone  [address] [phone] Your Updated Medication List  
  
   
This list is accurate as of: 11/14/17  8:28 AM.  Always use your most recent med list.  
  
  
  
  
 apixaban 5 mg tablet Commonly known as:  Lillie Alberto Take 1 Tab by mouth two (2) times a day. atorvastatin 40 mg tablet Commonly known as:  LIPITOR  
TAKE 1 TABLET DAILY  
  
 cetirizine 10 mg tablet Commonly known as:  ZYRTEC Take  by mouth daily. cholecalciferol (VITAMIN D3) 5,000 unit Tab tablet Commonly known as:  VITAMIN D3 Take 5,000 Units by mouth daily. metFORMIN 500 mg tablet Commonly known as:  GLUCOPHAGE Take 1 Tab by mouth two (2) times daily (with meals). methIMAzole 10 mg tablet Commonly known as:  TAPAZOLE Take 5 mg by mouth every Monday, Wednesday, Friday. montelukast 10 mg tablet Commonly known as:  SINGULAIR Take 1 Tab by mouth daily. multivitamin tablet Commonly known as:  ONE A DAY Take 1 Tab by mouth daily. naproxen 500 mg tablet Commonly known as:  NAPROSYN Take 1 Tab by mouth two (2) times daily (with meals). Indications: TENDONITIS  
  
 NASACORT 55 mcg nasal inhaler Generic drug:  triamcinolone 1 [place] by Both Nostrils route daily as needed. omega 3-dha-epa-fish oil 100-160-1,000 mg Cap Take 1,000 mg by mouth two (2) times a day. propranolol 10 mg tablet Commonly known as:  INDERAL Take 2 Tabs by mouth two (2) times a day. ramipril 2.5 mg capsule Commonly known as:  ALTACE Take 1 Cap by mouth daily. risedronate 35 mg tablet Commonly known as:  ACTONEL  
TAKE 1 TABLET EVERY 7 DAYS  
  
 TYLENOL 325 mg tablet Generic drug:  acetaminophen Take  by mouth every four (4) hours as needed for Pain. Prescriptions Sent to Pharmacy Refills  
 montelukast (SINGULAIR) 10 mg tablet 3 Sig: Take 1 Tab by mouth daily. Class: Normal  
 Pharmacy: [place] [address] Ph #: [phone] Route: Oral  
  
Follow-up Instructions Return in about 3 months (around 2/14/2018) for hypertension, diabetes. To-Do List   
 11/14/2017 Lab:  CBC W/O DIFF   
  
 11/14/2017 Lab:  HEMOGLOBIN A1C WITH EAG   
  
 11/14/2017 Lab:  LIPID PANEL   
  
 11/14/2017 Lab:  METABOLIC PANEL, COMPREHENSIVE   
  
 11/14/2017 Lab:  VITAMIN D, 1, 25 DIHYDROXY   
  
 12/15/2017 1:30 PM  
  Appointment with Tj Mendoza RD at [facility] ([phone]) Patient Instructions Saline Nasal Washes: Care Instructions Your Care Instructions Saline nasal washes help keep the nasal passages open by washing out thick or dried mucus. This simple remedy can help relieve symptoms of allergies, sinusitis, and colds. It also can make the nose feel more comfortable by keeping the mucous membranes moist. You may notice a little burning sensation in your nose the first few times you use the solution, but this usually gets better in a few days. Follow-up care is a key part of your treatment and safety. Be sure to make and go to all appointments, and call your doctor if you are having problems. It's also a good idea to know your test results and keep a list of the medicines you take. How can you care for yourself at home? · You can buy premixed saline solution in a squeeze bottle or other sinus rinse products at a drugstore. Read and follow the instructions on the label. · You also can make your own saline solution by adding 1 teaspoon of salt and 1 teaspoon of baking soda to 2 cups of distilled water. · If you use a homemade solution, pour a small amount into a clean bowl. Using a rubber bulb syringe, squeeze the syringe and place the tip in the salt water. Pull a small amount of the salt water into the syringe by relaxing your hand. · Sit down with your head tilted slightly back. Do not lie down. Put the tip of the bulb syringe or the squeeze bottle a little way into one of your nostrils. Gently drip or squirt a few drops into the nostril. Repeat with the other nostril. Some sneezing and gagging are normal at first. 
· Gently blow your nose. · Wipe the syringe or bottle tip clean after each use. · Repeat this 2 or 3 times a day. · Use nasal washes gently if you have nosebleeds often. When should you call for help? Watch closely for changes in your health, and be sure to contact your doctor if: 
? · You often get nosebleeds. ? · You have problems doing the nasal washes. Where can you learn more? Go to http://nickolas-selvin.info/. Enter [phone] in the search box to learn more about \"Saline Nasal Washes: Care Instructions. \" Current as of: May 12, 2017 Content Version: 11.4 © 0728-6429 [employer]. Care instructions adapted under license by [employer] (which disclaims liability or warranty for this information). If you have questions about a medical condition or this instruction, always ask your healthcare professional. [place] [address] any warranty or liability for your use of this information. Introducing [facility] & HEALTH SERVICES! Dear Shawanda Jose: Thank you for requesting a [employer] account. Our records indicate that you already have an active [employer] account. You can access your account anytime at [email or web address] [email or web address]. [email or web address] Did you know that you can access your hospital and ER discharge instructions at any time in [employer] (TUGG)? You can also review all of your test results from your hospital stay or ER visit. Additional Information If you have questions, please visit the Frequently Asked Questions section of the [employer] (TUGG) website at [email or web address] Remember, [employer] (TUGG) is NOT to be used for urgent needs. For medical emergencies, dial 911. Now available from your iPhone and Android! Please provide this summary of care documentation to your next provider. Your primary care clinician is listed as Idalia Galindo. If you have any questions after today's visit, please call [phone].

## 2017-11-14 NOTE — PROGRESS NOTES
HISTORY OF PRESENT ILLNESS  Gorge Rivera is a 74 y.o. female. HPI Comments: 73 yo female here for f/u of DM, HTN. Does not check glc at home but A1c has been controlled. She continues to work with dietitian and feels her diet is healthier. Has gained some weight since last visit. Not getting regular exercise. HTN is stable. No CP, SOB. She continues to have chronic sinus congestion. Takes Zyrtec regularly. Has nasal sprays but not using daily. No fevers, chills. She notes that she used to only have increased sx in spring and fall, but now they are year round. Recently seen by endo. TFTs stable. Review of Systems   Constitutional: Negative for chills, fever and weight loss. HENT: Positive for congestion and sinus pain. Negative for ear pain. Eyes: Negative for blurred vision and pain. Respiratory: Negative for cough and shortness of breath. Cardiovascular: Negative for chest pain, palpitations and leg swelling. Gastrointestinal: Negative for nausea and vomiting. Musculoskeletal: Negative for joint pain and myalgias. Skin: Negative for itching and rash. Neurological: Negative for dizziness, tingling and headaches. Psychiatric/Behavioral: Negative for depression. The patient is not nervous/anxious. Past Medical History:   Diagnosis Date    Atrial fibrillation     CHADS score 2  (-CHF, +HTN, -AGE, +DM, -CVA)    Bilateral cataracts     Colon polyps     7/14  repeat 5 yrs - Dr. Ni Urias    Diabetes     Dyslipidemia     Electronic cigarette use     Graves disease     5/14 Grave's disease, Dr. Holman Mean    Hypertension     Multiple thyroid nodules     5/14    Osteoporosis     last dexa 12/14    Plantar fasciitis, bilateral     Dr. Penaloza Ebbing Vitamin D deficiency      Current Outpatient Prescriptions on File Prior to Visit   Medication Sig Dispense Refill    ramipril (ALTACE) 2.5 mg capsule Take 1 Cap by mouth daily.  90 Cap 3    propranolol (INDERAL) 10 mg tablet Take 2 Tabs by mouth two (2) times a day. 360 Tab 3    risedronate (ACTONEL) 35 mg tablet TAKE 1 TABLET EVERY 7 DAYS 12 Tab 3    atorvastatin (LIPITOR) 40 mg tablet TAKE 1 TABLET DAILY 90 Tab 2    naproxen (NAPROSYN) 500 mg tablet Take 1 Tab by mouth two (2) times daily (with meals). Indications: TENDONITIS 60 Tab 2    metFORMIN (GLUCOPHAGE) 500 mg tablet Take 1 Tab by mouth two (2) times daily (with meals). 180 Tab 3    apixaban (ELIQUIS) 5 mg tablet Take 1 Tab by mouth two (2) times a day. 60 Tab 11    cholecalciferol, VITAMIN D3, (VITAMIN D3) 5,000 unit tab tablet Take 5,000 Units by mouth daily.  cetirizine (ZYRTEC) 10 mg tablet Take  by mouth daily.  methimazole (TAPAZOLE) 10 mg tablet Take 5 mg by mouth every Monday, Wednesday, Friday.  triamcinolone (NASACORT) 55 mcg nasal inhaler 1 [place] by Both Nostrils route daily as needed.  omega 3-dha-epa-fish oil 100-160-1,000 mg cap Take 1,000 mg by mouth two (2) times a day.  multivitamin (ONE A DAY) tablet Take 1 Tab by mouth daily.  acetaminophen (TYLENOL) 325 mg tablet Take  by mouth every four (4) hours as needed for Pain. No current facility-administered medications on file prior to visit. Social History   Substance Use Topics    Smoking status: Former Smoker     Packs/day: 0.50     Years: 15.00     Types: Cigarettes     Quit date: 2014    Smokeless tobacco: Never Used      Comment: electronic cigarettes    Alcohol use 1.8 oz/week     1 Glasses of wine, 1 Cans of beer, 1 Shots of liquor per week      Comment: occasionally     Physical Exam   Constitutional: She appears well-developed and well-nourished. No distress. /57  Pulse 62  Temp 97.3 °F (36.3 °C) (Oral)   Resp 17  Ht 5' 3\" (1.6 m)  Wt 187 lb (84.8 kg)  SpO2 98%  BMI 33.13 kg/m2     HENT:   Right Ear: External ear normal.   Left Ear: Tympanic membrane, external ear and ear canal normal.   Nose: Nose normal. No mucosal edema.  Right sinus exhibits no maxillary sinus tenderness and no frontal sinus tenderness. Left sinus exhibits no maxillary sinus tenderness and no frontal sinus tenderness. Cerumen debris on R   Eyes: EOM are normal. Right eye exhibits no discharge. Left eye exhibits no discharge. No scleral icterus. Neck: Neck supple. Cardiovascular: Normal rate, regular rhythm and normal heart sounds. Exam reveals no gallop and no friction rub. No murmur heard. Pulmonary/Chest: Effort normal and breath sounds normal. No respiratory distress. She has no wheezes. She has no rales. Musculoskeletal: She exhibits no edema or tenderness. Lymphadenopathy:     She has no cervical adenopathy. Neurological: She is alert. She exhibits normal muscle tone. Skin: Skin is warm and dry. Psychiatric: She has a normal mood and affect. Her behavior is normal.     Lab Results   Component Value Date/Time    Hemoglobin A1c 6.6 05/10/2017 12:28 PM    Hemoglobin A1c (POC) 6.7 08/14/2017 10:10 AM     Lab Results   Component Value Date/Time    Sodium 142 10/07/2016 09:56 AM    Potassium 4.0 10/07/2016 09:56 AM    Chloride 107 10/07/2016 09:56 AM    CO2 24 10/07/2016 09:56 AM    Anion gap 11 10/07/2016 09:56 AM    Glucose 162 10/07/2016 09:56 AM    BUN 13 10/07/2016 09:56 AM    Creatinine 0.75 10/07/2016 09:56 AM    BUN/Creatinine ratio 17 10/07/2016 09:56 AM    GFR est AA >60 10/07/2016 09:56 AM    GFR est non-AA >60 10/07/2016 09:56 AM    Calcium 9.1 10/07/2016 09:56 AM    Bilirubin, total 0.6 09/02/2016 08:12 AM    AST (SGOT) 26 09/02/2016 08:12 AM    Alk.  phosphatase 68 09/02/2016 08:12 AM    Protein, total 6.7 09/02/2016 08:12 AM    Albumin 3.8 09/02/2016 08:12 AM    Globulin 2.9 09/02/2016 08:12 AM    A-G Ratio 1.3 09/02/2016 08:12 AM    ALT (SGPT) 53 09/02/2016 08:12 AM     Lab Results   Component Value Date/Time    Cholesterol, total 142 10/07/2016 09:56 AM    HDL Cholesterol 59 10/07/2016 09:56 AM    LDL, calculated 38 10/07/2016 09:56 AM    VLDL, calculated 45 10/07/2016 09:56 AM    Triglyceride 225 10/07/2016 09:56 AM    CHOL/HDL Ratio 2.4 10/07/2016 09:56 AM     Lab Results   Component Value Date/Time    TSH 2.10 11/06/2017 08:45 AM     ASSESSMENT and PLAN    ICD-10-CM ICD-9-CM    1. Type 2 diabetes mellitus without complication, without long-term current use of insulin (HCC) E11.9 250.00 HEMOGLOBIN A1C WITH EAG      LIPID PANEL   2. Hypertension I11.9 402.10 CBC W/O DIFF      METABOLIC PANEL, COMPREHENSIVE   3. Sinus congestion R09.81 478.19    4. Vitamin D deficiency E55.9 268.9 VITAMIN D, 1, 25 DIHYDROXY   5. BMI 33.0-33.9,adult Z68.33 V85.33      Repeat labs today. Will continue with current medication for now and add Singulair for sinus sx. Discussed using nasal sprays more regularly. Continue to work on diet. Increase physical activity to goal of 30 minutes daily.

## 2017-11-14 NOTE — PROGRESS NOTES
ROOM #     Amauri Villanueva presents today for   Chief Complaint   Patient presents with    Hypertension    Diabetes       Amauri Villanueva preferred language for health care discussion is english/other. Is someone accompanying this pt? no    Is the patient using any DME equipment during OV? no    Depression Screening:  PHQ over the last two weeks 8/14/2017 5/10/2017 1/10/2017 9/12/2016 6/1/2015 5/20/2014   Little interest or pleasure in doing things Several days Nearly every day Several days Several days Not at all Not at all   Feeling down, depressed or hopeless Several days Several days Several days Several days Not at all Not at all   Total Score PHQ 2 2 4 2 2 0 0       Learning Assessment:  Learning Assessment 3/2/2016 11/25/2014 5/20/2014   PRIMARY LEARNER Patient Patient Patient   HIGHEST LEVEL OF EDUCATION - PRIMARY LEARNER  - [address] [facility] LEARNER - [address] [place], [place] [address] CAREGIVER - No -   PRIMARY LANGUAGE ENGLISH ENGLISH ENGLISH   LEARNER PREFERENCE PRIMARY DEMONSTRATION DEMONSTRATION OTHER (COMMENT)     - LISTENING -     - PICTURES -   ANSWERED BY patient patient patient    RELATIONSHIP SELF SELF SELF       Abuse Screening:  Abuse Screening Questionnaire 3/2/2016 11/25/2014   Do you ever feel afraid of your partner? N N   Are you in a relationship with someone who physically or mentally threatens you? N N   Is it safe for you to go home? Y Y       Fall Risk  Fall Risk Assessment, last 12 mths 8/14/2017 5/10/2017 1/10/2017 9/12/2016 6/1/2015 5/20/2014   Able to walk? Yes Yes Yes Yes No Yes   Fall in past 12 months? No No No No - No       Health Maintenance reviewed and discussed per provider. Yes    Amauri Villanueva is due for lipid, dm eye exam, glaucoma exam and MWV. Please order/place referral if appropriate. Advance Directive:  1. Do you have an advance directive in place? Patient Reply: no    2.  If not, would you like material regarding how to put one in place? Patient Reply: no    Coordination of Care:  1. Have you been to the ER, urgent care clinic since your last visit? Hospitalized since your last visit? no    2. Have you seen or consulted any other health care providers outside of the [address] [place] [address] since your last visit? Include any pap smears or colon screening.  no

## 2017-11-15 LAB — 1,25(OH)2D3 SERPL-MCNC: 42.6 PG/ML (ref 19.9–79.3)

## 2017-12-15 ENCOUNTER — APPOINTMENT (OUTPATIENT)
Dept: NUTRITION | Age: 67
End: 2017-12-15
Payer: COMMERCIAL

## 2017-12-26 ENCOUNTER — HOSPITAL ENCOUNTER (OUTPATIENT)
Dept: NUTRITION | Age: 67
Discharge: HOME | End: 2017-12-26
Payer: COMMERCIAL

## 2017-12-26 PROCEDURE — 97803 MED NUTRITION INDIV SUBSEQ: CPT

## 2017-12-27 RX ORDER — NAPROXEN 500 MG/1
TABLET ORAL
Qty: 60 TAB | Refills: 2 | Status: SHIPPED | OUTPATIENT
Start: 2017-12-27 | End: 2018-01-11

## 2017-12-27 RX ORDER — ATORVASTATIN CALCIUM 40 MG/1
TABLET, FILM COATED ORAL
Qty: 90 TAB | Refills: 2 | Status: SHIPPED | OUTPATIENT
Start: 2017-12-27 | End: 2018-10-10 | Stop reason: SDUPTHER

## 2018-01-11 ENCOUNTER — OFFICE VISIT (OUTPATIENT)
Dept: CARDIOLOGY CLINIC | Age: 68
End: 2018-01-11

## 2018-01-11 VITALS
OXYGEN SATURATION: 98 % | WEIGHT: 181 LBS | BODY MASS INDEX: 32.07 KG/M2 | DIASTOLIC BLOOD PRESSURE: 73 MMHG | HEART RATE: 73 BPM | SYSTOLIC BLOOD PRESSURE: 136 MMHG | HEIGHT: 63 IN

## 2018-01-11 DIAGNOSIS — I48.0 PAF (PAROXYSMAL ATRIAL FIBRILLATION) (HCC): Primary | ICD-10-CM

## 2018-01-11 NOTE — MR AVS SNAPSHOT
Visit Information Date & Time Provider Department Dept. Phone Encounter #  
 1/11/2018 10:45 AM Diego Castro MD Cardio Specialist at [facility] [phone] [id number] Follow-up Instructions Return in about 9 months (around 10/11/2018). Your Appointments 2/16/2018  8:00 AM  
Office Visit with Gracia Cano MD  
[facility]) Appt Note: 3 mo f/u htn, dm  
 [address]  
  
   
 [address] [facility] Upcoming Health Maintenance Date Due  
 EYE EXAM RETINAL OR DILATED Q1 11/18/1960 GLAUCOMA SCREENING Q2Y 8/1/2016 MEDICARE YEARLY EXAM 12/4/2016 FOOT EXAM Q1 1/10/2018 MICROALBUMIN Q1 1/10/2018 BREAST CANCER SCRN MAMMOGRAM 3/14/2018 HEMOGLOBIN A1C Q6M 5/14/2018 LIPID PANEL Q1 11/14/2018 COLONOSCOPY 7/7/2019 DTaP/Tdap/Td series (2 - Td) 5/10/2027 Allergies as of 1/11/2018  Review Complete On: 1/11/2018 By: Chance Matthews RN Severity Noted Reaction Type Reactions Sulfa (Sulfonamide Antibiotics)  05/20/2014   Intolerance Other (comments) Renal failure Current Immunizations  Reviewed on 1/10/2017 Name Date Influenza Vaccine 10/1/2013 Influenza Vaccine Yuliana Sprinkle) 10/6/2016 Pneumococcal Conjugate (PCV-13) 1/10/2017 Pneumococcal Polysaccharide (PPSV-23) 5/20/2014 Tdap 5/10/2017 Not reviewed this visit You Were Diagnosed With   
  
 Codes Comments PAF (paroxysmal atrial fibrillation) ([facility]    -  Primary ICD-10-CM: I48.0 ICD-9-CM: 427.31 Vitals BP Pulse Height(growth percentile) Weight(growth percentile) SpO2 BMI  
 136/73 73 5' 3\" (1.6 m) 181 lb (82.1 kg) 98% 32.06 kg/m2 OB Status Smoking Status Postmenopausal Former Smoker BMI and BSA Data Body Mass Index Body Surface Area 32.06 kg/m 2 1.91 m 2 Preferred Pharmacy Pharmacy Name Phone [facility] [phone] Your Updated Medication List  
  
   
This list is accurate as of: 1/11/18 11:12 AM.  Always use your most recent med list.  
  
  
  
  
 apixaban 5 mg tablet Commonly known as:  Jonelle Dearing Take 1 Tab by mouth two (2) times a day. atorvastatin 40 mg tablet Commonly known as:  LIPITOR  
TAKE 1 TABLET DAILY  
  
 cetirizine 10 mg tablet Commonly known as:  ZYRTEC Take  by mouth daily. cholecalciferol (VITAMIN D3) 5,000 unit Tab tablet Commonly known as:  VITAMIN D3 Take 5,000 Units by mouth daily. metFORMIN 500 mg tablet Commonly known as:  GLUCOPHAGE Take 1 Tab by mouth two (2) times daily (with meals). methIMAzole 10 mg tablet Commonly known as:  TAPAZOLE Take 5 mg by mouth every Monday, Wednesday, Friday. montelukast 10 mg tablet Commonly known as:  SINGULAIR Take 1 Tab by mouth daily. multivitamin tablet Commonly known as:  ONE A DAY Take 1 Tab by mouth daily. naproxen 500 mg tablet Commonly known as:  NAPROSYN  
TAKE 1 TABLET TWICE DAILY  WITH MEALS FOR TENDONITIS  
  
 NASACORT 55 mcg nasal inhaler Generic drug:  triamcinolone 1 [place] by Both Nostrils route daily as needed. omega 3-dha-epa-fish oil 100-160-1,000 mg Cap Take 1,000 mg by mouth two (2) times a day. propranolol 10 mg tablet Commonly known as:  INDERAL Take 2 Tabs by mouth two (2) times a day. ramipril 2.5 mg capsule Commonly known as:  ALTACE Take 1 Cap by mouth daily. risedronate 35 mg tablet Commonly known as:  ACTONEL  
TAKE 1 TABLET EVERY 7 DAYS  
  
 TYLENOL 325 mg tablet Generic drug:  acetaminophen Take  by mouth every four (4) hours as needed for Pain. We Performed the Following AMB POC EKG ROUTINE W/ 12 LEADS, INTER & REP [89433 CPT(R)] Follow-up Instructions Return in about 9 months (around 10/11/2018). Introducing [facility] & HEALTH SERVICES! Dear Odessa Freitas: Thank you for requesting a [employer] account. Our records indicate that you already have an active [employer] account. You can access your account anytime at [email or web address] Did you know that you can access your hospital and ER discharge instructions at any time in [employer]? You can also review all of your test results from your hospital stay or ER visit. Additional Information If you have questions, please visit the Frequently Asked Questions section of the [employer] website at [email or web address] Remember, [employer] is NOT to be used for urgent needs. For medical emergencies, dial 911. Now available from your iPhone and Android! Please provide this summary of care documentation to your next provider. Your primary care clinician is listed as Idalia Galindo. If you have any questions after today's visit, please call [phone].

## 2018-01-11 NOTE — PROGRESS NOTES
[facility]    HPI:   Ms. Ganga Mosley is a 69 y.o. female with history of diabetes and hypertension. She has atrial fibrillation that was diagnosed in the clinical setting of a hyperthyroid state in 2014. Paroxsymal in nature. Ms. Ganga Mosley is here today for follow up appointment. She does not have any cardiac complaints. Denies chest pain, palpitations, dyspnea, orthopnea, PND, LE edema or syncope/near-syncope. She has not been on a structured exercise regimen. She has no trouble with ADL's, but does occasionally have difficulties with arthritis of her knees. She does not believe she had any issues with atrial fibrillation. She is taking her medications as prescribed, no easy bruising or bleeding on Eliquis. Her hyperthyroidism is managed on Methimazole through her endocrinologist. She is employed by [employer] and works with [employer]. She is an RN. Past Medical History:   Diagnosis Date    Atrial fibrillation     CHADS score 2  (-CHF, +HTN, -AGE, +DM, -CVA)    Bilateral cataracts     Colon polyps     7/14  repeat 5 yrs - Dr. Simeon Carmichael    Diabetes     Dyslipidemia     Electronic cigarette use     Graves disease     5/14 Grave's disease, Dr. Crawford Reasons    Hypertension     Multiple thyroid nodules     5/14    Osteoporosis     last dexa 12/14    Plantar fasciitis, bilateral     Dr. Adenike Quiroga Vitamin D deficiency        Past Surgical History:   Procedure Laterality Date    HX CATARACT REMOVAL      bilateral    HX CHOLECYSTECTOMY      2000    HX COLONOSCOPY      7/14  Dr. Simeon Carmichael - repeat 7/19    HX ORTHOPAEDIC      1965  left knee    HX ORTHOPAEDIC      L shoulder x 2    HX WISDOM TEETH EXTRACTION         Current Outpatient Prescriptions   Medication Sig    atorvastatin (LIPITOR) 40 mg tablet TAKE 1 TABLET DAILY    naproxen (NAPROSYN) 500 mg tablet TAKE 1 TABLET TWICE DAILY  WITH MEALS FOR TENDONITIS    montelukast (SINGULAIR) 10 mg tablet Take 1 Tab by mouth daily.     ramipril (ALTACE) 2.5 mg capsule Take 1 Cap by mouth daily.  propranolol (INDERAL) 10 mg tablet Take 2 Tabs by mouth two (2) times a day.  risedronate (ACTONEL) 35 mg tablet TAKE 1 TABLET EVERY 7 DAYS    metFORMIN (GLUCOPHAGE) 500 mg tablet Take 1 Tab by mouth two (2) times daily (with meals).  apixaban (ELIQUIS) 5 mg tablet Take 1 Tab by mouth two (2) times a day.  cholecalciferol, VITAMIN D3, (VITAMIN D3) 5,000 unit tab tablet Take 5,000 Units by mouth daily.  cetirizine (ZYRTEC) 10 mg tablet Take  by mouth daily.  methimazole (TAPAZOLE) 10 mg tablet Take 5 mg by mouth every Monday, Wednesday, Friday.  triamcinolone (NASACORT) 55 mcg nasal inhaler 1 [place] by Both Nostrils route daily as needed.  omega 3-dha-epa-fish oil 100-160-1,000 mg cap Take 1,000 mg by mouth two (2) times a day.  multivitamin (ONE A DAY) tablet Take 1 Tab by mouth daily.  acetaminophen (TYLENOL) 325 mg tablet Take  by mouth every four (4) hours as needed for Pain. No current facility-administered medications for this visit. Allergies   Allergen Reactions    Sulfa (Sulfonamide Antibiotics) Other (comments)     Renal failure        Family History:    Heart Disease Father        Social History:   She  reports that she quit smoking about 4 years ago. Her smoking use included Cigarettes. She has a 7.50 pack-year smoking history. She has never used smokeless tobacco.  She  reports that she drinks about 1.8 oz of alcohol per week     Physical:   Vitals:   BP: 136/73  HR: 73  Wt: 181 lb (82.1 kg)    Exam:   General: Older woman, no complaints, no distress.     Head/Neck: No JVD    No bruits. Lungs:  No respiratory distress. Clear with good effort. Heart:  Regular.  No murmur. No rubs or gallops. Abdomen: Soft. Bowel sounds present    No edema.     Neurological: Alert and oriented to person, place, time. No gross neurological deficit. Skin:  Warm and dry.     Review of Data:   LABS:   Lab Results Component Value Date/Time    WBC 6.8 11/14/2017 08:33 AM    HGB 14.3 11/14/2017 08:33 AM    HCT 42.9 11/14/2017 08:33 AM    PLATELET 595 53/57/6714 08:33 AM     Lab Results   Component Value Date/Time    Sodium 140 11/14/2017 08:33 AM    Potassium 4.1 11/14/2017 08:33 AM    Chloride 106 11/14/2017 08:33 AM    CO2 25 11/14/2017 08:33 AM    Anion gap 9 11/14/2017 08:33 AM    Glucose 174 11/14/2017 08:33 AM    BUN 16 11/14/2017 08:33 AM    Creatinine 0.58 11/14/2017 08:33 AM    BUN/Creatinine ratio 28 11/14/2017 08:33 AM    GFR est AA >60 11/14/2017 08:33 AM    GFR est non-AA >60 11/14/2017 08:33 AM    Calcium 8.9 11/14/2017 08:33 AM    Bilirubin, total 0.6 11/14/2017 08:33 AM    AST (SGOT) 22 11/14/2017 08:33 AM    Alk. phosphatase 66 11/14/2017 08:33 AM    Protein, total 7.0 11/14/2017 08:33 AM    Albumin 4.0 11/14/2017 08:33 AM    Globulin 3.0 11/14/2017 08:33 AM    A-G Ratio 1.3 11/14/2017 08:33 AM    ALT (SGPT) 51 11/14/2017 08:33 AM     Lab Results   Component Value Date/Time    Cholesterol, total 154 11/14/2017 08:33 AM    HDL Cholesterol 55 11/14/2017 08:33 AM    LDL, calculated 47.4 11/14/2017 08:33 AM    Triglyceride 258 11/14/2017 08:33 AM    CHOL/HDL Ratio 2.8 11/14/2017 08:33 AM     Lab Results   Component Value Date/Time    Hemoglobin A1c 7.1 11/14/2017 08:33 AM    Hemoglobin A1c (POC) 6.7 08/14/2017 10:10 AM     EKG:   sinus rhythm, 62 beats per minute, nonspecific ST-T wave changes. ECHO: July 2014:  LEFT VENTRICLE: Size was normal. Systolic function was normal by visual assessment. Ejection fraction was estimated in the range of 55-60%. No obvious wall motion abnormalities identified in the views obtained. Wall thickness was at the upper limits of normal. DOPPLER: Doppler parameters were consistent with abnormal left ventricular relaxation (grade 1 diastolic dysfunction).     RIGHT VENTRICLE: The size was normal. Systolic function was normal. DOPPLER: Estimated peak pressure was in the range of 40-45mmHg. LEFT ATRIUM: Size was at the upper limits of normal. LA volume index was 25 ml/m squared. RIGHT ATRIUM: Size was normal.    MITRAL VALVE: Normal valve structure. DOPPLER: There was no evidence for stenosis. There was mild regurgitation. AORTIC VALVE: Not well visualized. DOPPLER: There was no stenosis. There was mild regurgitation. TRICUSPID VALVE: Normal valve structure. DOPPLER: There was no evidence for tricuspid stenosis. There was mild to moderate regurgitation. PULMONIC VALVE: Not well visualized, but normal Doppler findings. DOPPLER: There was no stenosis. There was no significant regurgitation. AORTA: The abdominal aorta measured 2.2 cm. The root exhibited normal size. PERICARDIUM: There was no pericardial effusion. The pericardium was normal in appearance. STRESS TEST (EST, PHARM, NUC, ECHO etc):     CATHETERIZATION:     EKG:  Normal sinus rhythm. Non-specific ST-T wave changes. Impression / Plan:      Atrial fibrillation:  Ms. Alba Doherty was diagnosed with atrial fibrillation in the setting of hyperthyroidism. Denies palpitations or known afib since her last visit. She is in sinus by EKG and exam. She is taking Eliquis without issue. She is also on Propranolol. Diabetes:  Goal hemoglobin A1c less than 7 is recommended from a cardiovascular standpoint. Last hemoglobin A1c on file is from November 2017 and is 7.1. Continue management through endocrinology. Hyperlipidemia:  Currently she is on Atorvastatin 40 mg daily. Last lipid profile was November 2017 with LDL level of 47.4. Hypertension:  Blood pressure today is 136/73 mmHg. Currently she is on Ramipril and Inderal.  I would recommend to continue both. Other than the above, or unless any additional issues arise, I have asked that she follow up in nine months.

## 2018-02-16 ENCOUNTER — OFFICE VISIT (OUTPATIENT)
Dept: INTERNAL MEDICINE CLINIC | Age: 68
End: 2018-02-16

## 2018-02-16 ENCOUNTER — HOSPITAL ENCOUNTER (OUTPATIENT)
Dept: LAB | Age: 68
Discharge: HOME | End: 2018-02-16
Payer: COMMERCIAL

## 2018-02-16 VITALS
WEIGHT: 177 LBS | BODY MASS INDEX: 31.36 KG/M2 | SYSTOLIC BLOOD PRESSURE: 122 MMHG | HEART RATE: 69 BPM | TEMPERATURE: 98 F | HEIGHT: 63 IN | DIASTOLIC BLOOD PRESSURE: 61 MMHG | RESPIRATION RATE: 16 BRPM | OXYGEN SATURATION: 97 %

## 2018-02-16 DIAGNOSIS — E11.9 TYPE 2 DIABETES MELLITUS WITHOUT COMPLICATION, WITHOUT LONG-TERM CURRENT USE OF INSULIN (HCC): Primary | ICD-10-CM

## 2018-02-16 DIAGNOSIS — E78.5 DYSLIPIDEMIA: ICD-10-CM

## 2018-02-16 DIAGNOSIS — E11.9 TYPE 2 DIABETES MELLITUS WITHOUT COMPLICATION, WITHOUT LONG-TERM CURRENT USE OF INSULIN (HCC): ICD-10-CM

## 2018-02-16 DIAGNOSIS — I11.9 BENIGN HYPERTENSIVE HEART DISEASE WITHOUT HEART FAILURE: ICD-10-CM

## 2018-02-16 DIAGNOSIS — E55.9 VITAMIN D DEFICIENCY: ICD-10-CM

## 2018-02-16 LAB
25(OH)D3 SERPL-MCNC: 56.8 NG/ML (ref 30–100)
ALBUMIN SERPL-MCNC: 4 G/DL (ref 3.4–5)
ALBUMIN/GLOB SERPL: 1.2 {RATIO} (ref 0.8–1.7)
ALP SERPL-CCNC: 74 U/L (ref 45–117)
ALT SERPL-CCNC: 52 U/L (ref 13–56)
ANION GAP SERPL CALC-SCNC: 9 MMOL/L (ref 3–18)
AST SERPL-CCNC: 26 U/L (ref 15–37)
BILIRUB SERPL-MCNC: 0.7 MG/DL (ref 0.2–1)
BUN SERPL-MCNC: 17 MG/DL (ref 7–18)
BUN/CREAT SERPL: 20 (ref 12–20)
CALCIUM SERPL-MCNC: 9.8 MG/DL (ref 8.5–10.1)
CHLORIDE SERPL-SCNC: 104 MMOL/L (ref 100–108)
CHOLEST SERPL-MCNC: 162 MG/DL
CO2 SERPL-SCNC: 27 MMOL/L (ref 21–32)
CREAT SERPL-MCNC: 0.87 MG/DL (ref 0.6–1.3)
CREAT UR-MCNC: 99.29 MG/DL (ref 30–125)
GLOBULIN SER CALC-MCNC: 3.4 G/DL (ref 2–4)
GLUCOSE SERPL-MCNC: 197 MG/DL (ref 74–99)
HBA1C MFR BLD: 7.4 % (ref 4.2–5.6)
HDLC SERPL-MCNC: 46 MG/DL (ref 40–60)
HDLC SERPL: 3.5 {RATIO} (ref 0–5)
LDLC SERPL CALC-MCNC: 76.4 MG/DL (ref 0–100)
LIPID PROFILE,FLP: ABNORMAL
MICROALBUMIN UR-MCNC: 0.7 MG/DL (ref 0–3)
MICROALBUMIN/CREAT UR-RTO: 7 MG/G (ref 0–30)
POTASSIUM SERPL-SCNC: 4.4 MMOL/L (ref 3.5–5.5)
PROT SERPL-MCNC: 7.4 G/DL (ref 6.4–8.2)
SODIUM SERPL-SCNC: 140 MMOL/L (ref 136–145)
TRIGL SERPL-MCNC: 198 MG/DL (ref ?–150)
VLDLC SERPL CALC-MCNC: 39.6 MG/DL

## 2018-02-16 PROCEDURE — 83036 HEMOGLOBIN GLYCOSYLATED A1C: CPT | Performed by: INTERNAL MEDICINE

## 2018-02-16 PROCEDURE — 36415 COLL VENOUS BLD VENIPUNCTURE: CPT | Performed by: INTERNAL MEDICINE

## 2018-02-16 PROCEDURE — 80053 COMPREHEN METABOLIC PANEL: CPT | Performed by: INTERNAL MEDICINE

## 2018-02-16 PROCEDURE — 80061 LIPID PANEL: CPT | Performed by: INTERNAL MEDICINE

## 2018-02-16 PROCEDURE — 82306 VITAMIN D 25 HYDROXY: CPT | Performed by: INTERNAL MEDICINE

## 2018-02-16 PROCEDURE — 82043 UR ALBUMIN QUANTITATIVE: CPT | Performed by: INTERNAL MEDICINE

## 2018-02-16 RX ORDER — METFORMIN HYDROCHLORIDE 500 MG/1
500 TABLET ORAL 2 TIMES DAILY WITH MEALS
Qty: 60 TAB | Refills: 0 | Status: SHIPPED | OUTPATIENT
Start: 2018-02-16 | End: 2018-08-17 | Stop reason: SDUPTHER

## 2018-02-16 RX ORDER — METFORMIN HYDROCHLORIDE 500 MG/1
500 TABLET ORAL 2 TIMES DAILY WITH MEALS
Qty: 180 TAB | Refills: 3 | Status: SHIPPED | OUTPATIENT
Start: 2018-02-16 | End: 2019-02-16 | Stop reason: SDUPTHER

## 2018-02-16 NOTE — PATIENT INSTRUCTIONS
Diabetes Foot Health: Care Instructions  Your Care Instructions    When you have diabetes, your feet need extra care and attention. Diabetes can damage the nerve endings and blood vessels in your feet, making you less likely to notice when your feet are injured. Diabetes also limits your body's ability to fight infection and get blood to areas that need it. If you get a minor foot injury, it could become an ulcer or a serious infection. With good foot care, you can prevent most of these problems. Caring for your feet can be quick and easy. Most of the care can be done when you are bathing or getting ready for bed. Follow-up care is a key part of your treatment and safety. Be sure to make and go to all appointments, and call your doctor if you are having problems. It's also a good idea to know your test results and keep a list of the medicines you take. How can you care for yourself at home? · Keep your blood sugar close to normal by watching what and how much you eat, monitoring blood sugar, taking medicines if prescribed, and getting regular exercise. · Do not smoke. Smoking affects blood flow and can make foot problems worse. If you need help quitting, talk to your doctor about stop-smoking programs and medicines. These can increase your chances of quitting for good. · Eat a diet that is low in fats. High fat intake can cause fat to build up in your blood vessels and decrease blood flow. · Inspect your feet daily for blisters, cuts, cracks, or sores. If you cannot see well, use a mirror or have someone help you. · Take care of your feet:  [facility] [employer] your feet every day. Use warm (not hot) water. Check the water temperature with your wrists or other part of your body, not your feet. ¨ Dry your feet well. Pat them dry. Do not rub the skin on your feet too hard. Dry well between your toes. If the skin on your feet stays moist, bacteria or a fungus can grow, which can lead to infection.   ¨ Keep your skin soft. Use moisturizing skin cream to keep the skin on your feet soft and prevent calluses and cracks. But do not put the cream between your toes, and stop using any cream that causes a rash. ¨ Clean underneath your toenails carefully. Do not use a sharp object to clean underneath your toenails. Use the blunt end of a nail file or other rounded tool. ¨ Trim and file your toenails straight across to prevent ingrown toenails. Use a nail clipper, not scissors. Use an emery board to smooth the edges. · Change socks daily. Socks without seams are best, because seams often rub the feet. You can find socks for people with diabetes from specialty catalogs. · Look inside your shoes every day for things like gravel or torn linings, which could cause blisters or sores. · Buy shoes that fit well:  ¨ Look for shoes that have plenty of space around the toes. This helps prevent bunions and blisters. ¨ Try on shoes while wearing the kind of socks you will usually wear with the shoes. ¨ Avoid plastic shoes. They may rub your feet and cause blisters. Good shoes should be made of materials that are flexible and breathable, such as leather or cloth. ¨ Break in new shoes slowly by wearing them for no more than an hour a day for several days. Take extra time to check your feet for red areas, blisters, or other problems after you wear new shoes. · Do not go barefoot. Do not wear sandals, and do not wear shoes with very thin soles. Thin soles are easy to puncture. They also do not protect your feet from hot pavement or cold weather. · Have your doctor check your feet during each visit. If you have a foot problem, see your doctor. Do not try to treat an early foot problem at home. Home remedies or treatments that you can buy without a prescription (such as corn removers) can be harmful. · Always get early treatment for foot problems. A minor irritation can lead to a major problem if not properly cared for early.   When should you call for help? Call your doctor now or seek immediate medical care if:  ? · You have a foot sore, an ulcer or break in the skin that is not healing after 4 days, bleeding corns or calluses, or an ingrown toenail. ? · You have blue or black areas, which can mean bruising or blood flow problems. ? · You have peeling skin or tiny blisters between your toes or cracking or oozing of the skin. ? · You have a fever for more than 24 hours and a foot sore. ? · You have new numbness or tingling in your feet that does not go away after you move your feet or change positions. ? · You have unexplained or unusual swelling of the foot or ankle. ? Watch closely for changes in your health, and be sure to contact your doctor if:  ? · You cannot do proper foot care. Where can you learn more? Go to http://nickolas-selvin.info/. Enter A739 in the search box to learn more about \"Diabetes Foot Health: Care Instructions. \"  Current as of: March 13, 2017  Content Version: 11.4  © 0904-2750 [employer]. Care instructions adapted under license by [employer] (which disclaims liability or warranty for this information). If you have questions about a medical condition or this instruction, always ask your healthcare professional. [address] any warranty or liability for your use of this information.

## 2018-02-16 NOTE — MR AVS SNAPSHOT
[facility] 
 
 
 [address] [address] 
[phone] Patient: Sutanvir Pump MRN: [id number] Visit Information Date & Time Provider Department Dept. Phone Encounter #  
 2/16/2018  8:00 AM [facility] [phone] [id number] Follow-up Instructions Return in about 6 months (around 8/16/2018), or if symptoms worsen or fail to improve, for hypertension, diabetes. Your Appointments 10/25/2018  9:00 AM  
Follow Up with Diego Han MD  
Cardio Specialist at [facility] Appt Note: 9 months follow up  
 [facility] [address] [address] [address] [facility] Upcoming Health Maintenance Date Due  
 EYE EXAM RETINAL OR DILATED Q1 11/18/1960 GLAUCOMA SCREENING Q2Y 8/1/2016 MEDICARE YEARLY EXAM 12/4/2016 BREAST CANCER SCRN MAMMOGRAM 3/14/2018 HEMOGLOBIN A1C Q6M 8/16/2018 FOOT EXAM Q1 2/16/2019 MICROALBUMIN Q1 2/16/2019 LIPID PANEL Q1 2/16/2019 COLONOSCOPY 7/7/2019 DTaP/Tdap/Td series (2 - Td) 5/10/2027 Allergies as of 2/16/2018  Review Complete On: 2/16/2018 By: Adalberto Martines MD  
  
 Severity Noted Reaction Type Reactions Sulfa (Sulfonamide Antibiotics)  05/20/2014   Intolerance Other (comments) Renal failure Current Immunizations  Reviewed on 1/10/2017 Name Date Influenza Vaccine 10/1/2013 Influenza Vaccine Versa Brenda) 10/6/2016 Pneumococcal Conjugate (PCV-13) 1/10/2017 Pneumococcal Polysaccharide (PPSV-23) 5/20/2014 Tdap 5/10/2017 Not reviewed this visit You Were Diagnosed With   
  
 Codes Comments Type 2 diabetes mellitus without complication, without long-term current use of insulin (HCC)    -  Primary ICD-10-CM: E11.9 ICD-9-CM: 250.00 Vitamin D deficiency     ICD-10-CM: E55.9 ICD-9-CM: 268.9 Benign hypertensive heart disease without heart failure     ICD-10-CM: I11.9 ICD-9-CM: 402.10 Dyslipidemia     ICD-10-CM: E78.5 ICD-9-CM: 272.4 Vitals BP Pulse Temp Resp Height(growth percentile) Weight(growth percentile) 122/61 (BP 1 Location: Right arm, BP Patient Position: Sitting) 69 98 °F (36.7 °C) (Oral) 16 5' 3\" (1.6 m) 177 lb (80.3 kg) SpO2 BMI OB Status Smoking Status 97% 31.35 kg/m2 Postmenopausal Former Smoker Vitals History BMI and BSA Data Body Mass Index Body Surface Area  
 31.35 kg/m 2 1.89 m 2 Preferred Pharmacy Pharmacy Name Phone [facility] [phone] Your Updated Medication List  
  
   
This list is accurate as of: 2/16/18  8:34 AM.  Always use your most recent med list.  
  
  
  
  
 apixaban 5 mg tablet Commonly known as:  Daniella Heater Take 1 Tab by mouth two (2) times a day. atorvastatin 40 mg tablet Commonly known as:  LIPITOR  
TAKE 1 TABLET DAILY  
  
 cetirizine 10 mg tablet Commonly known as:  ZYRTEC Take  by mouth daily. cholecalciferol (VITAMIN D3) 5,000 unit Tab tablet Commonly known as:  VITAMIN D3 Take 5,000 Units by mouth daily. * metFORMIN 500 mg tablet Commonly known as:  GLUCOPHAGE Take 1 Tab by mouth two (2) times daily (with meals). * metFORMIN 500 mg tablet Commonly known as:  GLUCOPHAGE Take 1 Tab by mouth two (2) times daily (with meals). methIMAzole 10 mg tablet Commonly known as:  TAPAZOLE Take 5 mg by mouth every Monday, Wednesday, Friday. montelukast 10 mg tablet Commonly known as:  SINGULAIR Take 1 Tab by mouth daily. multivitamin tablet Commonly known as:  ONE A DAY Take 1 Tab by mouth daily. NASACORT 55 mcg nasal inhaler Generic drug:  triamcinolone 1 [place] by Both Nostrils route daily as needed. omega 3-dha-epa-fish oil 100-160-1,000 mg Cap Take 1,000 mg by mouth two (2) times a day. propranolol 10 mg tablet Commonly known as:  INDERAL Take 2 Tabs by mouth two (2) times a day. ramipril 2.5 mg capsule Commonly known as:  ALTACE Take 1 Cap by mouth daily. risedronate 35 mg tablet Commonly known as:  ACTONEL  
TAKE 1 TABLET EVERY 7 DAYS  
  
 TYLENOL 325 mg tablet Generic drug:  acetaminophen Take  by mouth every four (4) hours as needed for Pain. * Notice: This list has 2 medication(s) that are the same as other medications prescribed for you. Read the directions carefully, and ask your doctor or other care provider to review them with you. Prescriptions Sent to Pharmacy Refills  
 metFORMIN (GLUCOPHAGE) 500 mg tablet 3 Sig: Take 1 Tab by mouth two (2) times daily (with meals). Class: Normal  
 Pharmacy: [facility] [address] Ph #: [phone] Route: Oral  
 metFORMIN (GLUCOPHAGE) 500 mg tablet 0 Sig: Take 1 Tab by mouth two (2) times daily (with meals). Class: Normal  
 Pharmacy: [facility] [address] Ph #: [phone] Route: Oral  
  
We Performed the Following  DIABETES FOOT EXAM [[facility]] Follow-up Instructions Return in about 6 months (around 8/16/2018), or if symptoms worsen or fail to improve, for hypertension, diabetes. To-Do List   
 02/16/2018 Lab:  HEMOGLOBIN A1C W/O EAG   
  
 02/16/2018 Lab:  LIPID PANEL   
  
 02/16/2018 Lab:  METABOLIC PANEL, COMPREHENSIVE   
  
 02/16/2018 Lab:  MICROALBUMIN, UR, RAND W/ MICROALBUMIN/CREA RATIO   
  
 02/16/2018 Lab:  VITAMIN D, 25 HYDROXY Patient Instructions Diabetes Foot Health: Care Instructions Your Care Instructions When you have diabetes, your feet need extra care and attention.  Diabetes can damage the nerve endings and blood vessels in your feet, making you less likely to notice when your feet are injured. Diabetes also limits your body's ability to fight infection and get blood to areas that need it. If you get a minor foot injury, it could become an ulcer or a serious infection. With good foot care, you can prevent most of these problems. Caring for your feet can be quick and easy. Most of the care can be done when you are bathing or getting ready for bed. Follow-up care is a key part of your treatment and safety. Be sure to make and go to all appointments, and call your doctor if you are having problems. It's also a good idea to know your test results and keep a list of the medicines you take. How can you care for yourself at home? · Keep your blood sugar close to normal by watching what and how much you eat, monitoring blood sugar, taking medicines if prescribed, and getting regular exercise. · Do not smoke. Smoking affects blood flow and can make foot problems worse. If you need help quitting, talk to your doctor about stop-smoking programs and medicines. These can increase your chances of quitting for good. · Eat a diet that is low in fats. High fat intake can cause fat to build up in your blood vessels and decrease blood flow. · Inspect your feet daily for blisters, cuts, cracks, or sores. If you cannot see well, use a mirror or have someone help you. · Take care of your feet: 
[facility] [place] [facility] your feet every day. Use warm (not hot) water. Check the water temperature with your wrists or other part of your body, not your feet. ¨ Dry your feet well. Pat them dry. Do not rub the skin on your feet too hard. Dry well between your toes. If the skin on your feet stays moist, bacteria or a fungus can grow, which can lead to infection. ¨ Keep your skin soft. Use moisturizing skin cream to keep the skin on your feet soft and prevent calluses and cracks. But do not put the cream between your toes, and stop using any cream that causes a rash. ¨ Clean underneath your toenails carefully. Do not use a sharp object to clean underneath your toenails. Use the blunt end of a nail file or other rounded tool. ¨ Trim and file your toenails straight across to prevent ingrown toenails. Use a nail clipper, not scissors. Use an emery board to smooth the edges. · Change socks daily. Socks without seams are best, because seams often rub the feet. You can find socks for people with diabetes from specialty catalogs. · Look inside your shoes every day for things like gravel or torn linings, which could cause blisters or sores. · Buy shoes that fit well: 
¨ Look for shoes that have plenty of space around the toes. This helps prevent bunions and blisters. ¨ Try on shoes while wearing the kind of socks you will usually wear with the shoes. ¨ Avoid plastic shoes. They may rub your feet and cause blisters. Good shoes should be made of materials that are flexible and breathable, such as leather or cloth. ¨ Break in new shoes slowly by wearing them for no more than an hour a day for several days. Take extra time to check your feet for red areas, blisters, or other problems after you wear new shoes. · Do not go barefoot. Do not wear sandals, and do not wear shoes with very thin soles. Thin soles are easy to puncture. They also do not protect your feet from hot pavement or cold weather. · Have your doctor check your feet during each visit. If you have a foot problem, see your doctor. Do not try to treat an early foot problem at home. Home remedies or treatments that you can buy without a prescription (such as corn removers) can be harmful. · Always get early treatment for foot problems. A minor irritation can lead to a major problem if not properly cared for early. When should you call for help?  
Call your doctor now or seek immediate medical care if: 
? · You have a foot sore, an ulcer or break in the skin that is not healing after 4 days, bleeding corns or calluses, or an ingrown toenail. ? · You have blue or black areas, which can mean bruising or blood flow problems. ? · You have peeling skin or tiny blisters between your toes or cracking or oozing of the skin. ? · You have a fever for more than 24 hours and a foot sore. ? · You have new numbness or tingling in your feet that does not go away after you move your feet or change positions. ? · You have unexplained or unusual swelling of the foot or ankle. ? Watch closely for changes in your health, and be sure to contact your doctor if: 
? · You cannot do proper foot care. Where can you learn more? Go to http://nickolas-selvin.info/. Enter A739 in the search box to learn more about \"Diabetes Foot Health: Care Instructions. \" Current as of: March 13, 2017 Content Version: 11.4 © 4168-5176 [employer]. Care instructions adapted under license by [employer] (which disclaims liability or warranty for this information). If you have questions about a medical condition or this instruction, always ask your healthcare professional. [place] [address] any warranty or liability for your use of this information. Introducing \[facility]\"" & HEALTH SERVICES! Dear Milagros Oscar: Thank you for requesting a [email or web address] account. Our records indicate that you already have an active [email or web address] account. You can access your account anytime at [email or web address] Did you know that you can access your hospital and ER discharge instructions at any time in [email or web address]? You can also review all of your test results from your hospital stay or ER visit. Additional Information If you have questions, please visit the Frequently Asked Questions section of the [email or web address] website at [email or web address] Remember, [email or web address] is NOT to be used for urgent needs. For medical emergencies, dial 911. Now available from your iPhone and Android! Please provide this summary of care documentation to your next provider. Your primary care clinician is listed as Idalia Galindo. If you have any questions after today's visit, please call [phone].

## 2018-02-16 NOTE — PROGRESS NOTES
HISTORY OF PRESENT ILLNESS  Lu Basurto is a 69 y.o. female. HPI Comments: 70 yo female here for f/u of DM, HTN, HLD. No complaints other than sinus congestion which is chronic and stable for her. DM: mild increase in A1c last labs. Does not check glc at home. HTN remains well controlled. HLD Mild elevation of TG last labs. Review of Systems   Constitutional: Negative for chills, fever and weight loss. HENT: Positive for congestion. Negative for ear pain. Eyes: Negative for blurred vision and pain. Respiratory: Negative for cough and shortness of breath. Cardiovascular: Negative for chest pain, palpitations and leg swelling. Gastrointestinal: Negative for nausea and vomiting. Genitourinary: Negative for frequency and urgency. Musculoskeletal: Negative for joint pain and myalgias. Skin: Negative for itching and rash. Neurological: Negative for dizziness, tingling and headaches. Psychiatric/Behavioral: Negative for depression. The patient is not nervous/anxious. Past Medical History:   Diagnosis Date    Atrial fibrillation     CHADS score 2  (-CHF, +HTN, -AGE, +DM, -CVA)    Bilateral cataracts     Colon polyps     7/14  repeat 5 yrs - Dr. Caitlyn Chatman    Diabetes     Dyslipidemia     Electronic cigarette use     Graves disease     5/14 Grave's disease, Dr. Lorraine Doherty    Hypertension     Multiple thyroid nodules     5/14    Osteoporosis     last dexa 12/14    Plantar fasciitis, bilateral     Dr. Jasmin Rivers Vitamin D deficiency      Current Outpatient Prescriptions on File Prior to Visit   Medication Sig Dispense Refill    apixaban (ELIQUIS) 5 mg tablet Take 1 Tab by mouth two (2) times a day. 180 Tab 3    atorvastatin (LIPITOR) 40 mg tablet TAKE 1 TABLET DAILY 90 Tab 2    montelukast (SINGULAIR) 10 mg tablet Take 1 Tab by mouth daily. 90 Tab 3    ramipril (ALTACE) 2.5 mg capsule Take 1 Cap by mouth daily.  90 Cap 3    propranolol (INDERAL) 10 mg tablet Take 2 Tabs by mouth two (2) times a day. 360 Tab 3    risedronate (ACTONEL) 35 mg tablet TAKE 1 TABLET EVERY 7 DAYS 12 Tab 3    metFORMIN (GLUCOPHAGE) 500 mg tablet Take 1 Tab by mouth two (2) times daily (with meals). 180 Tab 3    cholecalciferol, VITAMIN D3, (VITAMIN D3) 5,000 unit tab tablet Take 5,000 Units by mouth daily.  cetirizine (ZYRTEC) 10 mg tablet Take  by mouth daily.  methimazole (TAPAZOLE) 10 mg tablet Take 5 mg by mouth every Monday, Wednesday, Friday.  triamcinolone (NASACORT) 55 mcg nasal inhaler 1 [place] by Both Nostrils route daily as needed.  omega 3-dha-epa-fish oil 100-160-1,000 mg cap Take 1,000 mg by mouth two (2) times a day.  multivitamin (ONE A DAY) tablet Take 1 Tab by mouth daily.  acetaminophen (TYLENOL) 325 mg tablet Take  by mouth every four (4) hours as needed for Pain. No current facility-administered medications on file prior to visit. Physical Exam   Constitutional: She appears well-developed and well-nourished. No distress. /61 (BP 1 Location: Right arm, BP Patient Position: Sitting)  Pulse 69  Temp 98 °F (36.7 °C) (Oral)   Resp 16  Ht 5' 3\" (1.6 m)  Wt 177 lb (80.3 kg)  SpO2 97%  BMI 31.35 kg/m2     Eyes: EOM are normal. Right eye exhibits no discharge. Left eye exhibits no discharge. No scleral icterus. Neck: Neck supple. Cardiovascular: Normal rate, regular rhythm and normal heart sounds. Exam reveals no gallop and no friction rub. No murmur heard. Pulmonary/Chest: Effort normal and breath sounds normal. No respiratory distress. She has no wheezes. She has no rales. Musculoskeletal: She exhibits no edema or tenderness. Lymphadenopathy:     She has no cervical adenopathy. Neurological: She is alert. She exhibits normal muscle tone. Skin: Skin is warm and dry. Psychiatric: She has a normal mood and affect. Diabetic Foot exam: 2+ dorsalis pedis pulses bilaterally.  Positive monofilament in all 10 toes and bottoms of both feet. Negative for lesions, signs of infection, or foot abnormalities. Lab Results   Component Value Date/Time    Sodium 140 11/14/2017 08:33 AM    Potassium 4.1 11/14/2017 08:33 AM    Chloride 106 11/14/2017 08:33 AM    CO2 25 11/14/2017 08:33 AM    Anion gap 9 11/14/2017 08:33 AM    Glucose 174 (H) 11/14/2017 08:33 AM    BUN 16 11/14/2017 08:33 AM    Creatinine 0.58 (L) 11/14/2017 08:33 AM    BUN/Creatinine ratio 28 (H) 11/14/2017 08:33 AM    GFR est AA >60 11/14/2017 08:33 AM    GFR est non-AA >60 11/14/2017 08:33 AM    Calcium 8.9 11/14/2017 08:33 AM    Bilirubin, total 0.6 11/14/2017 08:33 AM    AST (SGOT) 22 11/14/2017 08:33 AM    Alk. phosphatase 66 11/14/2017 08:33 AM    Protein, total 7.0 11/14/2017 08:33 AM    Albumin 4.0 11/14/2017 08:33 AM    Globulin 3.0 11/14/2017 08:33 AM    A-G Ratio 1.3 11/14/2017 08:33 AM    ALT (SGPT) 51 11/14/2017 08:33 AM     Lab Results   Component Value Date/Time    Hemoglobin A1c 7.1 (H) 11/14/2017 08:33 AM    Hemoglobin A1c (POC) 6.7 08/14/2017 10:10 AM     Lab Results   Component Value Date/Time    TSH 2.10 11/06/2017 08:45 AM     Lab Results   Component Value Date/Time    Cholesterol, total 154 11/14/2017 08:33 AM    HDL Cholesterol 55 11/14/2017 08:33 AM    LDL, calculated 47.4 11/14/2017 08:33 AM    VLDL, calculated 51.6 11/14/2017 08:33 AM    Triglyceride 258 (H) 11/14/2017 08:33 AM    CHOL/HDL Ratio 2.8 11/14/2017 08:33 AM     Lab Results   Component Value Date/Time    Microalbumin/Creat ratio (mg/g creat) 56 (H) 01/10/2017 01:21 PM    Microalbumin,urine random 0.80 01/10/2017 01:21 PM     ASSESSMENT and PLAN    ICD-10-CM ICD-9-CM    1. Type 2 diabetes mellitus without complication, without long-term current use of insulin (HCC) E11.9 250.00 HEMOGLOBIN A1C W/O EAG      HM DIABETES FOOT EXAM      MICROALBUMIN, UR, RAND W/ MICROALBUMIN/CREA RATIO   2. Vitamin D deficiency E55.9 268.9 VITAMIN D, 25 HYDROXY   3.  Hypertension S57.7 300.44 METABOLIC PANEL, COMPREHENSIVE   4. Dyslipidemia E78.5 272.4 LIPID PANEL     Repeat labs today. Will continue with current medication for now.

## 2018-02-16 NOTE — PROGRESS NOTES
[address] presents today for   Chief Complaint   Patient presents with    Diabetes    Cholesterol Problem    Hypertension       Maya Bach preferred language for health care discussion is english/other. Is someone accompanying this pt? no    Is the patient using any DME equipment during OV? no    Depression Screening:  PHQ over the last two weeks 8/14/2017 5/10/2017 1/10/2017 9/12/2016 6/1/2015 5/20/2014   Little interest or pleasure in doing things Several days Nearly every day Several days Several days Not at all Not at all   Feeling down, depressed or hopeless Several days Several days Several days Several days Not at all Not at all   Total Score PHQ 2 2 4 2 2 0 0       Learning Assessment:  Learning Assessment 3/2/2016 11/25/2014 5/20/2014   PRIMARY LEARNER Patient Patient Patient   HIGHEST LEVEL OF EDUCATION - PRIMARY LEARNER  - 2 [facility] LEARNER - [address] [place], [place] [address] CAREGIVER - No -   PRIMARY LANGUAGE ENGLISH ENGLISH ENGLISH   LEARNER PREFERENCE PRIMARY DEMONSTRATION DEMONSTRATION OTHER (COMMENT)     - LISTENING -     - PICTURES -   ANSWERED BY patient patient patient    RELATIONSHIP SELF SELF SELF       Abuse Screening:  Abuse Screening Questionnaire 3/2/2016 11/25/2014   Do you ever feel afraid of your partner? N N   Are you in a relationship with someone who physically or mentally threatens you? N N   Is it safe for you to go home? Y Y       Fall Risk  Fall Risk Assessment, last 12 mths 2/16/2018 8/14/2017 5/10/2017 1/10/2017 9/12/2016 6/1/2015 5/20/2014   Able to walk? Yes Yes Yes Yes Yes No Yes   Fall in past 12 months? No No No No No - No       Health Maintenance reviewed and discussed per provider. Yes    Maya Bach is due for eye exam, glaucoma screening (getting records from [facility]), foot exam, microalbumin, mammogram.  Please order/place referral if appropriate. Advance Directive:  1.  Do you have an advance directive in place? Patient Reply: no    2. If not, would you like material regarding how to put one in place? Patient Reply: no      Coordination of Care:  1. Have you been to the ER, urgent care clinic since your last visit? Hospitalized since your last visit? no    2. Have you seen or consulted any other health care providers outside of the [address] [place] [address] since your last visit? Include any pap smears or colon screening.  Yes cardiology

## 2018-03-12 RX ORDER — RISEDRONATE SODIUM 35 MG/1
TABLET, FILM COATED ORAL
Qty: 12 TAB | Refills: 3 | Status: SHIPPED | OUTPATIENT
Start: 2018-03-12 | End: 2019-02-16 | Stop reason: SDUPTHER

## 2018-03-12 RX ORDER — PROPRANOLOL HYDROCHLORIDE 10 MG/1
TABLET ORAL
Qty: 360 TAB | Refills: 3 | Status: SHIPPED | OUTPATIENT
Start: 2018-03-12 | End: 2019-03-16 | Stop reason: SDUPTHER

## 2018-03-30 NOTE — PROGRESS NOTES
NUTRITION - FOLLOW-UP TREATMENT NOTE  Patient Name: Jordi Simpson         Date: 2017  : 1950    YES Patient  Verified  Diagnosis: DM, Obesity   In time:   2:30p            Out time:   3p   Total Treatment Time (min):   30     SUBJECTIVE/ASSESSMENT    Changes in medication or medical history? Any new allergies, surgeries or procedures? YES    If yes, update Summary List   Pt in for follow up doing well with previous nutritional recommendations, but reports she has been ill due to allergies for more than 2 months which has prevented her from exercises. She has been having difficulty breathing and experiencing vertigo, which she has dealt with in the past due to allergies. States pills don't help, but hasn't been to allergist in many years. She also reports she is eating breakfast, but not within 2 hours of waking because she does not feel hungry then. Overall, diet has continued to improve with balance, appetite has been regulated, and she is not craving sweets frequently like before. Achievement of Goals: 1. Try to have something to eat within 2 hours of waking up. =not met, revised  2. Avoid going longer than 5 hours without eating. If you know you will, use a snack to prevent feelings of extreme hunger. =met, continue  3. [place]inute walking for 15 minutes, 2-4x/week. =not met, revised         Patient Education:  [x]  Review current plan with patient   []  Other:    Handouts/  Information Provided: []  Carbohydrates  []  Protein  []  Fiber  []  Serving Sizes  []  Fluids  []  Non-starchy veg []  Diabetes  []  Cholesterol  []  Sodium  []  SBGM  []  Food Journals  []  Others:      New Patient Goals: 1. Try to have something for breakfast as soon as possible after waking even if you don't feel physically hungry. 2. Ask about options for allergy treatment. 3. Try to get back into exercise routine when feeling better.       PLAN    [x]  Continue on current plan [x]  Follow-up PRN   [] Discharge due to :    []  Next appt: Call as needed     Dietitian: Chaparro Rascon MS, RD    Date: 12/26/2017 Time: 2:43 PM ASA:  Mall: ASA:1  Mall:2

## 2018-04-23 RX ORDER — NAPROXEN 500 MG/1
TABLET ORAL
Qty: 60 TAB | Refills: 2 | Status: SHIPPED | OUTPATIENT
Start: 2018-04-23 | End: 2018-07-02 | Stop reason: SDUPTHER

## 2018-05-07 ENCOUNTER — HOSPITAL ENCOUNTER (OUTPATIENT)
Dept: LAB | Age: 68
Discharge: HOME | End: 2018-05-07
Payer: COMMERCIAL

## 2018-05-07 LAB
T4 FREE SERPL-MCNC: 1 NG/DL (ref 0.7–1.5)
TSH SERPL DL<=0.05 MIU/L-ACNC: 1.76 UIU/ML (ref 0.36–3.74)

## 2018-05-07 PROCEDURE — 84443 ASSAY THYROID STIM HORMONE: CPT | Performed by: INTERNAL MEDICINE

## 2018-05-07 PROCEDURE — 36415 COLL VENOUS BLD VENIPUNCTURE: CPT | Performed by: INTERNAL MEDICINE

## 2018-05-07 PROCEDURE — 84439 ASSAY OF FREE THYROXINE: CPT | Performed by: INTERNAL MEDICINE

## 2018-07-02 RX ORDER — NAPROXEN 500 MG/1
TABLET ORAL
Qty: 60 TAB | Refills: 2 | Status: SHIPPED | OUTPATIENT
Start: 2018-07-02 | End: 2018-09-08 | Stop reason: SDUPTHER

## 2018-08-17 ENCOUNTER — OFFICE VISIT (OUTPATIENT)
Dept: INTERNAL MEDICINE CLINIC | Age: 68
End: 2018-08-17

## 2018-08-17 VITALS
WEIGHT: 177.4 LBS | OXYGEN SATURATION: 97 % | BODY MASS INDEX: 31.43 KG/M2 | TEMPERATURE: 98 F | HEART RATE: 63 BPM | RESPIRATION RATE: 18 BRPM | DIASTOLIC BLOOD PRESSURE: 57 MMHG | SYSTOLIC BLOOD PRESSURE: 147 MMHG | HEIGHT: 63 IN

## 2018-08-17 DIAGNOSIS — I11.9 BENIGN HYPERTENSIVE HEART DISEASE WITHOUT HEART FAILURE: ICD-10-CM

## 2018-08-17 DIAGNOSIS — J34.9 SINUS DISEASE: ICD-10-CM

## 2018-08-17 DIAGNOSIS — E11.9 TYPE 2 DIABETES MELLITUS WITHOUT COMPLICATION, WITHOUT LONG-TERM CURRENT USE OF INSULIN (HCC): Primary | ICD-10-CM

## 2018-08-17 LAB — HBA1C MFR BLD HPLC: 7 %

## 2018-08-17 RX ORDER — RAMIPRIL 2.5 MG/1
5 CAPSULE ORAL DAILY
Qty: 180 CAP | Refills: 3
Start: 2018-08-17 | End: 2018-10-25 | Stop reason: DRUGHIGH

## 2018-08-17 NOTE — PROGRESS NOTES
HISTORY OF PRESENT ILLNESS  Amauri Villanueva is a 69 y.o. female. HPI Comments: 68 yo female here for f/u of DM, HTN, sinus congestion. DM: Does not check glc at home A1c has been stable. HTN: BP is a little elevated. Denies CP, SOB. Sinus pain/congestion has worsened despite singulair, zyrtec, steroid NS. No fevers, chills. Has sx all year. Worse at times with change in weather. Review of Systems   Constitutional: Negative for chills, fever and weight loss. HENT: Positive for congestion and sinus pain. Negative for ear pain and sore throat. Eyes: Negative for blurred vision and pain. Respiratory: Negative for cough and shortness of breath. Cardiovascular: Negative for chest pain, palpitations and leg swelling. Gastrointestinal: Negative for nausea and vomiting. Genitourinary: Negative for frequency and urgency. Musculoskeletal: Negative for joint pain and myalgias. Skin: Negative for itching and rash. Neurological: Negative for dizziness, tingling and headaches. Psychiatric/Behavioral: Negative for depression. The patient is not nervous/anxious.       Past Medical History:   Diagnosis Date    Atrial fibrillation     CHADS score 2  (-CHF, +HTN, -AGE, +DM, -CVA)    Bilateral cataracts     Colon polyps     7/14  repeat 5 yrs - Dr. Soto Stokes    Diabetes     Dyslipidemia     Electronic cigarette use     Graves disease     5/14 Grave's disease, Dr. Margaret Junior    Hypertension     Multiple thyroid nodules     5/14    Osteoporosis     last dexa 12/14    Plantar fasciitis, bilateral     Dr. Leopold Coup Vitamin D deficiency      Current Outpatient Prescriptions on File Prior to Visit   Medication Sig Dispense Refill    naproxen (NAPROSYN) 500 mg tablet TAKE 1 TABLET TWICE DAILY  WITH MEALS FOR TENDONITIS 60 Tab 2    propranolol (INDERAL) 10 mg tablet TAKE 2 TABLETS TWICE A  Tab 3    risedronate (ACTONEL) 35 mg tablet TAKE 1 TABLET EVERY 7 DAYS 12 Tab 3    metFORMIN (GLUCOPHAGE) 500 mg tablet Take 1 Tab by mouth two (2) times daily (with meals). 180 Tab 3    apixaban (ELIQUIS) 5 mg tablet Take 1 Tab by mouth two (2) times a day. 180 Tab 3    atorvastatin (LIPITOR) 40 mg tablet TAKE 1 TABLET DAILY 90 Tab 2    montelukast (SINGULAIR) 10 mg tablet Take 1 Tab by mouth daily. 90 Tab 3    ramipril (ALTACE) 2.5 mg capsule Take 1 Cap by mouth daily. 90 Cap 3    cholecalciferol, VITAMIN D3, (VITAMIN D3) 5,000 unit tab tablet Take 5,000 Units by mouth daily.  cetirizine (ZYRTEC) 10 mg tablet Take  by mouth daily.  methimazole (TAPAZOLE) 10 mg tablet Take 5 mg by mouth every Monday, Wednesday, Friday.  triamcinolone (NASACORT) 55 mcg nasal inhaler 1 [place] by Both Nostrils route daily as needed.  omega 3-dha-epa-fish oil 100-160-1,000 mg cap Take 1,000 mg by mouth two (2) times a day.  multivitamin (ONE A DAY) tablet Take 1 Tab by mouth daily.  acetaminophen (TYLENOL) 325 mg tablet Take  by mouth every four (4) hours as needed for Pain. No current facility-administered medications on file prior to visit. Physical Exam   Constitutional: She appears well-developed and well-nourished. No distress. /57 (BP 1 Location: Right arm, BP Patient Position: Sitting)  Pulse 63  Temp 98 °F (36.7 °C) (Oral)   Resp 18  Ht 5' 3\" (1.6 m)  Wt 177 lb 6.4 oz (80.5 kg)  SpO2 97%  BMI 31.42 kg/m2     Eyes: EOM are normal. Right eye exhibits no discharge. Left eye exhibits no discharge. No scleral icterus. Neck: Neck supple. Cardiovascular: Normal rate, regular rhythm and normal heart sounds. Exam reveals no gallop and no friction rub. No murmur heard. Pulmonary/Chest: Effort normal and breath sounds normal. No respiratory distress. She has no wheezes. She has no rales. Musculoskeletal: She exhibits no edema or tenderness. Lymphadenopathy:     She has no cervical adenopathy. Neurological: She is alert. She exhibits normal muscle tone.    Skin: Skin is warm and dry. Psychiatric: She has a normal mood and affect. Lab Results   Component Value Date/Time    Hemoglobin A1c 7.4 (H) 02/16/2018 08:49 AM    Hemoglobin A1c (POC) 6.7 08/14/2017 10:10 AM     Lab Results   Component Value Date/Time    Sodium 140 02/16/2018 08:49 AM    Potassium 4.4 02/16/2018 08:49 AM    Chloride 104 02/16/2018 08:49 AM    CO2 27 02/16/2018 08:49 AM    Anion gap 9 02/16/2018 08:49 AM    Glucose 197 (H) 02/16/2018 08:49 AM    BUN 17 02/16/2018 08:49 AM    Creatinine 0.87 02/16/2018 08:49 AM    BUN/Creatinine ratio 20 02/16/2018 08:49 AM    GFR est AA >60 02/16/2018 08:49 AM    GFR est non-AA >60 02/16/2018 08:49 AM    Calcium 9.8 02/16/2018 08:49 AM    Bilirubin, total 0.7 02/16/2018 08:49 AM    AST (SGOT) 26 02/16/2018 08:49 AM    Alk. phosphatase 74 02/16/2018 08:49 AM    Protein, total 7.4 02/16/2018 08:49 AM    Albumin 4.0 02/16/2018 08:49 AM    Globulin 3.4 02/16/2018 08:49 AM    A-G Ratio 1.2 02/16/2018 08:49 AM    ALT (SGPT) 52 02/16/2018 08:49 AM     Lab Results   Component Value Date/Time    Microalbumin/Creat ratio (mg/g creat) 7 02/16/2018 08:49 AM    Microalbumin,urine random 0.70 02/16/2018 08:49 AM     Lab Results   Component Value Date/Time    Cholesterol, total 162 02/16/2018 08:49 AM    HDL Cholesterol 46 02/16/2018 08:49 AM    LDL, calculated 76.4 02/16/2018 08:49 AM    VLDL, calculated 39.6 02/16/2018 08:49 AM    Triglyceride 198 (H) 02/16/2018 08:49 AM    CHOL/HDL Ratio 3.5 02/16/2018 08:49 AM       ASSESSMENT and PLAN    ICD-10-CM ICD-9-CM    1. Type 2 diabetes mellitus without complication, without long-term current use of insulin (HCC) E11.9 250.00    2. Hypertension I11.9 402.10    3. Sinus disease J34.9 473.9 REFERRAL TO ENT-OTOLARYNGOLOGY     A1c stable at 7.0%. Will continue with current medication for this  BP is a bit elevated. Will try increasing ramipril to 5mg daily. Monitor BP. Can repeat labs next visit.    Referral entered to ENT for evaluation of her chronic sinus sx.

## 2018-08-17 NOTE — MR AVS SNAPSHOT
[facility] 
 
 
 [address] [id number] 
[phone] Patient: Junior Lane MRN: [id number] QAA:[id number] Visit Information Date & Time Provider Department Dept. Phone Encounter #  
 8/17/2018  8:30 AM Nory Harding, Health system [phone] [id number] Follow-up Instructions Return in about 3 months (around 11/17/2018) for diabetes, hypertension. Your Appointments 10/25/2018  9:00 AM  
Follow Up with Diego Villar MD  
Cardio Specialist at [place] [id number] [facility]) Appt Note: 9 months follow up  
 [facility] [id number] Upcoming Health Maintenance Date Due  
 BREAST CANCER SCRN MAMMOGRAM 3/14/2018 MEDICARE YEARLY EXAM 3/28/2018 Influenza Age 5 to Adult 8/1/2018 HEMOGLOBIN A1C Q6M 8/16/2018 EYE EXAM RETINAL OR DILATED Q1 12/22/2018 FOOT EXAM Q1 2/16/2019 MICROALBUMIN Q1 2/16/2019 LIPID PANEL Q1 2/16/2019 COLONOSCOPY 7/7/2019 GLAUCOMA SCREENING Q2Y 12/22/2019 DTaP/Tdap/Td series (2 - Td) 5/10/2027 Allergies as of 8/17/2018  Review Complete On: 8/17/2018 By: Milton Sanchez Severity Noted Reaction Type Reactions Sulfa (Sulfonamide Antibiotics)  05/20/2014   Intolerance Other (comments) Renal failure Current Immunizations  Reviewed on 1/10/2017 Name Date Influenza Vaccine 10/1/2013 Influenza Vaccine Lavaun Sorrel) 10/6/2016 Pneumococcal Conjugate (PCV-13) 1/10/2017 Pneumococcal Polysaccharide (PPSV-23) 5/20/2014 Tdap 5/10/2017 Not reviewed this visit You Were Diagnosed With   
  
 Codes Comments Type 2 diabetes mellitus without complication, without long-term current use of insulin (HCC)    -  Primary ICD-10-CM: E11.9 ICD-9-CM: 250.00 Benign hypertensive heart disease without heart failure     ICD-10-CM: I11.9 ICD-9-CM: 402.10 Sinus disease     ICD-10-CM: J34.9 ICD-9-CM: 473.9 Vitals BP Pulse Temp Resp Height(growth percentile) Weight(growth percentile) 147/57 (BP 1 Location: Right arm, BP Patient Position: Sitting) 63 98 °F (36.7 °C) (Oral) 18 5' 3\" (1.6 m) 177 lb 6.4 oz (80.5 kg) SpO2 BMI OB Status Smoking Status 97% 31.42 kg/m2 Postmenopausal Former Smoker BMI and BSA Data Body Mass Index Body Surface Area  
 31.42 kg/m 2 1.89 m 2 Preferred Pharmacy Pharmacy Name Phone  [facility] [address] [phone] Your Updated Medication List  
  
   
This list is accurate as of 8/17/18  9:07 AM.  Always use your most recent med list.  
  
  
  
  
 apixaban 5 mg tablet Commonly known as:  Aida Timothy Take 1 Tab by mouth two (2) times a day. atorvastatin 40 mg tablet Commonly known as:  LIPITOR  
TAKE 1 TABLET DAILY  
  
 cetirizine 10 mg tablet Commonly known as:  ZYRTEC Take  by mouth daily. cholecalciferol (VITAMIN D3) 5,000 unit Tab tablet Commonly known as:  VITAMIN D3 Take 5,000 Units by mouth daily. metFORMIN 500 mg tablet Commonly known as:  GLUCOPHAGE Take 1 Tab by mouth two (2) times daily (with meals). methIMAzole 10 mg tablet Commonly known as:  TAPAZOLE Take 5 mg by mouth every Monday, Wednesday, Friday. montelukast 10 mg tablet Commonly known as:  SINGULAIR Take 1 Tab by mouth daily. multivitamin tablet Commonly known as:  ONE A DAY Take 1 Tab by mouth daily. naproxen 500 mg tablet Commonly known as:  NAPROSYN  
TAKE 1 TABLET TWICE DAILY  WITH MEALS FOR TENDONITIS  
  
 NASACORT 55 mcg nasal inhaler Generic drug:  triamcinolone 1 [place] by Both Nostrils route daily as needed. omega 3-dha-epa-fish oil 100-160-1,000 mg Cap Take 1,000 mg by mouth two (2) times a day. propranolol 10 mg tablet Commonly known as:  INDERAL  
TAKE 2 TABLETS TWICE A DAY  
  
 ramipril 2.5 mg capsule Commonly known as:  ALTACE Take 2 Caps by mouth daily. Indications: hypertension  
  
 risedronate 35 mg tablet Commonly known as:  ACTONEL  
TAKE 1 TABLET EVERY 7 DAYS  
  
 TYLENOL 325 mg tablet Generic drug:  acetaminophen Take  by mouth every four (4) hours as needed for Pain. We Performed the Following REFERRAL TO ENT-OTOLARYNGOLOGY [PHP99 Custom] Comments:  
 Please evaluate patient for chronic sinus congestion not responding to multiple oral medication, nasal sprays. .  
  
Follow-up Instructions Return in about 3 months (around 11/17/2018) for diabetes, hypertension. Referral Information Referral ID Referred By Referred To  
  
 [id number] Cristi Cardenas MD   
   [address] [place] [address] Phone: [phone] Fax: [phone] Visits Status Start Date End Date 1 New Request 8/17/18 8/17/19 If your referral has a status of pending review or denied, additional information will be sent to support the outcome of this decision. Patient Instructions How to Read a Food Label to Limit Sodium: Care Instructions Your Care Instructions Sodium causes your body to hold on to extra water. This can raise your blood pressure and force your heart and kidneys to work harder. In very serious cases, this could cause you to be put in the hospital. It might even be life-threatening. By limiting sodium, you will feel better and lower your risk of serious problems. Processed foods, fast food, and restaurant foods are the major sources of dietary sodium. The most common name for sodium is salt. Try to limit how much sodium you eat to less than 2,300 milligrams (mg) a day.  If you limit your sodium to 1,500 mg a day, you can lower your blood pressure even more. This limit counts all the salt that you eat in foods you cook or in packaged foods. Keep a list of everything you eat and drink. Follow-up care is a key part of your treatment and safety. Be sure to make and go to all appointments, and call your doctor if you are having problems. It's also a good idea to know your test results and keep a list of the medicines you take. How can you care for yourself at home? Read ingredient lists on food labels · Read the list of ingredients on food labels to help you find how much sodium is in a food. The label lists the ingredients in a food in descending order (from the most to the least). If salt or sodium is high on the list, there may be a lot of sodium in the food. · Know that sodium has different names. Sodium is also called monosodium glutamate (MSG, common in [facility] food), sodium citrate, sodium alginate, sodium hydroxide, and sodium phosphate. Read Nutrition Facts labels · On most foods, there is a Nutrition Facts label. This will tell you how much sodium is in one serving of food. Look at both the serving size and the sodium amount. The serving size is located at the top of the label, usually right under the \"Nutrition Facts\" title. The amount of sodium is given in the list under the title. It is given in milligrams (mg). ¨ Check the serving size carefully. A single serving is often very small, and you may eat more than one serving. If this is the case, you will eat more sodium than listed on the label. For example, if the serving size for a canned soup is 1 cup and the sodium amount is 470 mg, if you have 2 cups you will eat 940 mg of sodium. · The nutrition facts for fresh fruits and vegetables are not listed on the food. They may be listed somewhere in the store. These foods usually have no sodium or low sodium. · The Nutrition Facts label also gives you the Percent Daily Value for sodium.  This is how much of the recommended amount of sodium a serving contains. The daily value for sodium is less than 2,300 mg. So if the Percent Daily Value says 50%, this means one serving is giving you half of this, or 1,150 mg. Buy low-sodium foods · Look for foods that are made with less sodium. Watch for the following words on the label. ¨ \"Unsalted\" means there is no sodium added to the food. But there may be sodium already in the food naturally. ¨ \"Sodium-free\" means a serving has less than 5 mg of sodium. ¨ \"Very low sodium\" means a serving has 35 mg or less of sodium. ¨ \"Low-sodium\" means a serving has 140 mg or less of sodium. · \"Reduced-sodium\" means that there is 25% less sodium than what the food normally has. This is still usually too much sodium. Try not to buy foods with this on the label. · Buy fresh vegetables, or frozen vegetables without added sauces. Buy low-sodium versions of canned vegetables, soups, and other canned goods. Where can you learn more? Go to http://nickolasVuCOMPselvin.info/. Enter 26 [id number] in the search box to learn more about \"How to Read a Food Label to Limit Sodium: Care Instructions. \" Current as of: May 12, 2017 Content Version: 11.7 © 4755-8916 Healthwise, [facility]. Care instructions adapted under license by [employer] (which disclaims liability or warranty for this information). If you have questions about a medical condition or this instruction, always ask your healthcare professional. [place] [address] any warranty or liability for your use of this information. Introducing [facility]! Dear Nadir Coles: Thank you for requesting a tagWALLET account. Our records indicate that you already have an active tagWALLET account. You can access your account anytime at [email or web address] Did you know that you can access your hospital and ER discharge instructions at any time in tagWALLET?   You can also review all of your test results from your hospital stay or ER visit. Additional Information If you have questions, please visit the Frequently Asked Questions section of the [employer] website at [email or web address] [employer]. [email or web address] Remember, [employer] is NOT to be used for urgent needs. For medical emergencies, dial 911. Now available from your iPhone and Android! Please provide this summary of care documentation to your next provider. Your primary care clinician is listed as Idalia Galindo. If you have any questions after today's visit, please call [phone].

## 2018-08-17 NOTE — PROGRESS NOTES
ROOM # [id number]  Identified pt with two pt identifiers(name and ). Reviewed record in preparation for visit and have obtained necessary documentation. Chief Complaint   Patient presents with    Hypertension    Diabetes      Camron Sanchez preferred language for health care discussion is english/other. Is the patient using any DME equipment during OV? Guillermo Gupta is due for:  Health Maintenance Due   Topic    BREAST CANCER SCRN MAMMOGRAM     MEDICARE YEARLY EXAM     Influenza Age 5 to Adult     HEMOGLOBIN A1C Q6M      Health Maintenance reviewed and discussed per provider  Please order/place referral if appropriate. Advance Directive:  1. Do you have an advance directive in place? Patient Reply: NO    2. If not, would you like material regarding how to put one in place? NO    Coordination of Care:  1. Have you been to the ER, urgent care clinic since your last visit? Hospitalized since your last visit? NO    2. Have you seen or consulted any other health care providers outside of the [address] [place] [address] since your last visit? Include any pap smears or colon screening. NO    Patient is accompanied by self I have received verbal consent from Camron Sanchez to discuss any/all medical information while they are present in the room.     Learning Assessment:  Learning Assessment 3/2/2016 2014 2014   PRIMARY LEARNER Patient Patient Patient   HIGHEST LEVEL OF EDUCATION - PRIMARY LEARNER  - 2 YEARS OF COLLEGE 2 YEARS OF COLLEGE   BARRIERS PRIMARY LEARNER - NONE NONE   CO-LEARNER CAREGIVER - No -   PRIMARY LANGUAGE ENGLISH ENGLISH ENGLISH   LEARNER PREFERENCE PRIMARY DEMONSTRATION DEMONSTRATION OTHER (COMMENT)     - LISTENING -     - PICTURES -   ANSWERED BY patient patient patient    RELATIONSHIP SELF SELF SELF     Depression Screening:  PHQ over the last two weeks 2018 2017 5/10/2017 1/10/2017 2016 2015 2014   Little interest or pleasure in doing things Not at all Several days Nearly every day Several days Several days Not at all Not at all   Feeling down, depressed, irritable, or hopeless Not at all Several days Several days Several days Several days Not at all Not at all   Total Score PHQ 2 0 2 4 2 2 0 0     Abuse Screening:  Abuse Screening Questionnaire 3/2/2016 11/25/2014   Do you ever feel afraid of your partner? N N   Are you in a relationship with someone who physically or mentally threatens you? N N   Is it safe for you to go home? Y Y     Fall Risk  Fall Risk Assessment, last 12 mths 8/17/2018 2/16/2018 8/14/2017 5/10/2017 1/10/2017 9/12/2016 6/1/2015   Able to walk? Yes Yes Yes Yes Yes Yes No   Fall in past 12 months?  No No No No No No -

## 2018-08-17 NOTE — PATIENT INSTRUCTIONS
How to Read a Food Label to Limit Sodium: Care Instructions  Your Care Instructions  Sodium causes your body to hold on to extra water. This can raise your blood pressure and force your heart and kidneys to work harder. In very serious cases, this could cause you to be put in the hospital. It might even be life-threatening. By limiting sodium, you will feel better and lower your risk of serious problems. Processed foods, fast food, and restaurant foods are the major sources of dietary sodium. The most common name for sodium is salt. Try to limit how much sodium you eat to less than 2,300 milligrams (mg) a day. If you limit your sodium to 1,500 mg a day, you can lower your blood pressure even more. This limit counts all the salt that you eat in foods you cook or in packaged foods. Keep a list of everything you eat and drink. Follow-up care is a key part of your treatment and safety. Be sure to make and go to all appointments, and call your doctor if you are having problems. It's also a good idea to know your test results and keep a list of the medicines you take. How can you care for yourself at home? Read ingredient lists on food labels  · Read the list of ingredients on food labels to help you find how much sodium is in a food. The label lists the ingredients in a food in descending order (from the most to the least). If salt or sodium is high on the list, there may be a lot of sodium in the food. · Know that sodium has different names. Sodium is also called monosodium glutamate (MSG, common in [facility] food), sodium citrate, sodium alginate, sodium hydroxide, and sodium phosphate. Read Nutrition Facts labels  · On most foods, there is a Nutrition Facts label. This will tell you how much sodium is in one serving of food. Look at both the serving size and the sodium amount. The serving size is located at the top of the label, usually right under the \"Nutrition Facts\" title.  The amount of sodium is given in the list under the title. It is given in milligrams (mg). ¨ Check the serving size carefully. A single serving is often very small, and you may eat more than one serving. If this is the case, you will eat more sodium than listed on the label. For example, if the serving size for a canned soup is 1 cup and the sodium amount is 470 mg, if you have 2 cups you will eat 940 mg of sodium. · The nutrition facts for fresh fruits and vegetables are not listed on the food. They may be listed somewhere in the store. These foods usually have no sodium or low sodium. · The Nutrition Facts label also gives you the Percent Daily Value for sodium. This is how much of the recommended amount of sodium a serving contains. The daily value for sodium is less than 2,300 mg. So if the Percent Daily Value says 50%, this means one serving is giving you half of this, or 1,150 mg. Buy low-sodium foods  · Look for foods that are made with less sodium. Watch for the following words on the label. ¨ \"Unsalted\" means there is no sodium added to the food. But there may be sodium already in the food naturally. ¨ \"Sodium-free\" means a serving has less than 5 mg of sodium. ¨ \"Very low sodium\" means a serving has 35 mg or less of sodium. ¨ \"Low-sodium\" means a serving has 140 mg or less of sodium. · \"Reduced-sodium\" means that there is 25% less sodium than what the food normally has. This is still usually too much sodium. Try not to buy foods with this on the label. · Buy fresh vegetables, or frozen vegetables without added sauces. Buy low-sodium versions of canned vegetables, soups, and other canned goods. Where can you learn more? Go to http://nickolas-selvin.info/. Enter 26 [id number] in the search box to learn more about \"How to Read a Food Label to Limit Sodium: Care Instructions. \"  Current as of: May 12, 2017  Content Version: 11.7  © 5846-2801 [employer].  Care instructions adapted under license by [employer] (which disclaims liability or warranty for this information). If you have questions about a medical condition or this instruction, always ask your healthcare professional. [address] any warranty or liability for your use of this information.

## 2018-09-10 RX ORDER — NAPROXEN 500 MG/1
TABLET ORAL
Qty: 60 TAB | Refills: 2 | Status: SHIPPED | OUTPATIENT
Start: 2018-09-10 | End: 2019-01-21

## 2018-09-10 RX ORDER — RAMIPRIL 2.5 MG/1
CAPSULE ORAL
Qty: 90 CAP | Refills: 3 | Status: SHIPPED | OUTPATIENT
Start: 2018-09-10 | End: 2018-10-22 | Stop reason: CLARIF

## 2018-10-10 RX ORDER — ATORVASTATIN CALCIUM 40 MG/1
TABLET, FILM COATED ORAL
Qty: 90 TAB | Refills: 2 | Status: SHIPPED | OUTPATIENT
Start: 2018-10-10 | End: 2019-08-01 | Stop reason: SDUPTHER

## 2018-10-23 ENCOUNTER — HOSPITAL ENCOUNTER (OUTPATIENT)
Dept: LAB | Age: 68
Discharge: HOME | End: 2018-10-23
Payer: COMMERCIAL

## 2018-10-23 DIAGNOSIS — I10 PRIMARY HYPERTENSION: ICD-10-CM

## 2018-10-23 LAB
ANION GAP SERPL CALC-SCNC: 7 MMOL/L (ref 3–18)
BUN SERPL-MCNC: 14 MG/DL (ref 7–18)
BUN/CREAT SERPL: 19 (ref 12–20)
CALCIUM SERPL-MCNC: 8.9 MG/DL (ref 8.5–10.1)
CHLORIDE SERPL-SCNC: 107 MMOL/L (ref 100–108)
CO2 SERPL-SCNC: 26 MMOL/L (ref 21–32)
CREAT SERPL-MCNC: 0.72 MG/DL (ref 0.6–1.3)
GLUCOSE SERPL-MCNC: 162 MG/DL (ref 74–99)
POTASSIUM SERPL-SCNC: 4 MMOL/L (ref 3.5–5.5)
SODIUM SERPL-SCNC: 140 MMOL/L (ref 136–145)

## 2018-10-23 PROCEDURE — 80048 BASIC METABOLIC PNL TOTAL CA: CPT | Performed by: OTOLARYNGOLOGY

## 2018-10-23 PROCEDURE — 36415 COLL VENOUS BLD VENIPUNCTURE: CPT | Performed by: OTOLARYNGOLOGY

## 2018-10-25 ENCOUNTER — OFFICE VISIT (OUTPATIENT)
Dept: CARDIOLOGY CLINIC | Age: 68
End: 2018-10-25

## 2018-10-25 VITALS
HEART RATE: 62 BPM | HEIGHT: 63 IN | SYSTOLIC BLOOD PRESSURE: 132 MMHG | OXYGEN SATURATION: 98 % | BODY MASS INDEX: 31.89 KG/M2 | DIASTOLIC BLOOD PRESSURE: 64 MMHG | WEIGHT: 180 LBS

## 2018-10-25 DIAGNOSIS — I10 ESSENTIAL HYPERTENSION WITH GOAL BLOOD PRESSURE LESS THAN 140/90: ICD-10-CM

## 2018-10-25 DIAGNOSIS — I48.0 PAF (PAROXYSMAL ATRIAL FIBRILLATION) (HCC): Primary | ICD-10-CM

## 2018-10-25 DIAGNOSIS — E78.00 PURE HYPERCHOLESTEROLEMIA: ICD-10-CM

## 2018-10-25 RX ORDER — RAMIPRIL 10 MG/1
10 CAPSULE ORAL DAILY
Qty: 90 CAP | Refills: 3 | Status: SHIPPED | OUTPATIENT
Start: 2018-10-25 | End: 2019-10-19 | Stop reason: SDUPTHER

## 2018-10-25 NOTE — PROGRESS NOTES
[facility]    HPI:   Ms. Jayleen Red is a 69 y.o. female with history of diabetes and hypertension. She has atrial fibrillation that was diagnosed in the clinical setting of a hyperthyroid state in 2014. Paroxsymal in nature. Ms. Jayleen Red is here today for follow up appointment after nine months. She denies any symptoms to suggest angina or heart failure. She denies any significant palpitations, presyncope or syncope. She has some back pain. She is able to do activities of daily living without any significant cardiac problems. She denies any lower extremity swelling or PND. She is supposed to undergo nasal septal surgery next week. Past Medical History:   Diagnosis Date    Atrial fibrillation     CHADS score 2  (-CHF, +HTN, -AGE, +DM, -CVA)    Bilateral cataracts     Colon polyps     7/14  repeat 5 yrs - Dr. Kristopher Pettit    Diabetes     Dyslipidemia     Electronic cigarette use     Graves disease     5/14 Grave's disease, Dr. Liban Madrigal History of seasonal allergies     Hypertension     Hypertension     Multiple thyroid nodules     5/14    Osteoporosis     last dexa 12/14    Plantar fasciitis, bilateral     Dr. Fili Mcnair Sleep apnea     mild sleep apnea, no CPAP    Vitamin D deficiency        Past Surgical History:   Procedure Laterality Date    HX CATARACT REMOVAL      bilateral    HX COLONOSCOPY      7/14  Dr. Kristopher Pettit - repeat 7/19    HX LAP CHOLECYSTECTOMY      2000    HX ORTHOPAEDIC      1965  left knee    HX ORTHOPAEDIC Right     right shoulder surgery x2    HX WISDOM TEETH EXTRACTION         Current Outpatient Medications   Medication Sig    atorvastatin (LIPITOR) 40 mg tablet TAKE 1 TABLET DAILY    naproxen (NAPROSYN) 500 mg tablet TAKE 1 TABLET TWICE DAILY  WITH MEALS FOR TENDONITIS    ramipril (ALTACE) 2.5 mg capsule Take 2 Caps by mouth daily.  Indications: hypertension    propranolol (INDERAL) 10 mg tablet TAKE 2 TABLETS TWICE A DAY    risedronate (ACTONEL) 35 mg tablet TAKE 1 TABLET EVERY 7 DAYS    metFORMIN (GLUCOPHAGE) 500 mg tablet Take 1 Tab by mouth two (2) times daily (with meals).  apixaban (ELIQUIS) 5 mg tablet Take 1 Tab by mouth two (2) times a day.  montelukast (SINGULAIR) 10 mg tablet Take 1 Tab by mouth daily.  cholecalciferol, VITAMIN D3, (VITAMIN D3) 5,000 unit tab tablet Take 5,000 Units by mouth daily.  cetirizine (ZYRTEC) 10 mg tablet Take  by mouth daily.  methimazole (TAPAZOLE) 10 mg tablet Take 5 mg by mouth every Monday, Wednesday, Friday.  triamcinolone (NASACORT) 55 mcg nasal inhaler 1 [place] by Both Nostrils route daily as needed.  omega 3-dha-epa-fish oil 100-160-1,000 mg cap Take 1,000 mg by mouth two (2) times a day.  multivitamin (ONE A DAY) tablet Take 1 Tab by mouth daily.  acetaminophen (TYLENOL) 325 mg tablet Take  by mouth every four (4) hours as needed for Pain. No current facility-administered medications for this visit. Allergies   Allergen Reactions    Sulfa (Sulfonamide Antibiotics) Other (comments)     Renal failure        Family History:    Heart Disease Father        Social History:   She  reports that she quit smoking about 4 years ago. Her smoking use included cigarettes. She has a 7.50 pack-year smoking history. she has never used smokeless tobacco.  She  reports that she drinks about 1.8 oz of alcohol per week. Physical:   Vitals:   BP: 132/64  HR: 62  Wt: 180 lb (81.6 kg)    Exam:   General: Older woman, no complaints, no distress.     Head/Neck: No JVD    No bruits. Lungs:  No respiratory distress. Clear with good effort. Heart:  Regular.  No murmur. No rubs or gallops. Abdomen: Soft. Bowel sounds present    No edema.     Neurological: Alert and oriented to person, place, time. No gross neurological deficit. Skin:  Warm and dry.     Review of Data:   LABS:   Lab Results   Component Value Date/Time    WBC 6.8 11/14/2017 08:33 AM    HGB 14.3 11/14/2017 08:33 AM    HCT 42.9 11/14/2017 08:33 AM    PLATELET 775 66/57/4461 08:33 AM     Lab Results   Component Value Date/Time    Sodium 140 10/23/2018 11:13 AM    Potassium 4.0 10/23/2018 11:13 AM    Chloride 107 10/23/2018 11:13 AM    CO2 26 10/23/2018 11:13 AM    Anion gap 7 10/23/2018 11:13 AM    Glucose 162 (H) 10/23/2018 11:13 AM    BUN 14 10/23/2018 11:13 AM    Creatinine 0.72 10/23/2018 11:13 AM    BUN/Creatinine ratio 19 10/23/2018 11:13 AM    GFR est AA >60 10/23/2018 11:13 AM    GFR est non-AA >60 10/23/2018 11:13 AM    Calcium 8.9 10/23/2018 11:13 AM    Bilirubin, total 0.7 02/16/2018 08:49 AM    AST (SGOT) 26 02/16/2018 08:49 AM    Alk. phosphatase 74 02/16/2018 08:49 AM    Protein, total 7.4 02/16/2018 08:49 AM    Albumin 4.0 02/16/2018 08:49 AM    Globulin 3.4 02/16/2018 08:49 AM    A-G Ratio 1.2 02/16/2018 08:49 AM    ALT (SGPT) 52 02/16/2018 08:49 AM     Lab Results   Component Value Date/Time    Cholesterol, total 162 02/16/2018 08:49 AM    HDL Cholesterol 46 02/16/2018 08:49 AM    LDL, calculated 76.4 02/16/2018 08:49 AM    Triglyceride 198 (H) 02/16/2018 08:49 AM    CHOL/HDL Ratio 3.5 02/16/2018 08:49 AM     Lab Results   Component Value Date/Time    Hemoglobin A1c 7.4 (H) 02/16/2018 08:49 AM    Hemoglobin A1c (POC) 7.0 08/17/2018 10:00 AM     EKG:   sinus rhythm, 62 beats per minute, nonspecific ST-T wave changes. ECHO: July 2014:  LEFT VENTRICLE: Size was normal. Systolic function was normal by visual assessment. Ejection fraction was estimated in the range of 55-60%. No obvious wall motion abnormalities identified in the views obtained. Wall thickness was at the upper limits of normal. DOPPLER: Doppler parameters were consistent with abnormal left ventricular relaxation (grade 1 diastolic dysfunction). RIGHT VENTRICLE: The size was normal. Systolic function was normal. DOPPLER: Estimated peak pressure was in the range of 40-45mmHg.   LEFT ATRIUM: Size was at the upper limits of normal. LA volume index was 25 ml/m squared. RIGHT ATRIUM: Size was normal.  MITRAL VALVE: Normal valve structure. DOPPLER: There was no evidence for stenosis. There was mild regurgitation. AORTIC VALVE: Not well visualized. DOPPLER: There was no stenosis. There was mild regurgitation. TRICUSPID VALVE: Normal valve structure. DOPPLER: There was no evidence for tricuspid stenosis. There was mild to moderate regurgitation. PULMONIC VALVE: Not well visualized, but normal Doppler findings. DOPPLER: There was no stenosis. There was no significant regurgitation. AORTA: The abdominal aorta measured 2.2 cm. The root exhibited normal size. PERICARDIUM: There was no pericardial effusion. The pericardium was normal in appearance. EKG:  Normal sinus rhythm. Non-specific ST-T wave changes. Impression / Plan:      Atrial fibrillation:  Ms. Jayleen Red was diagnosed with atrial fibrillation in the setting of hyperthyroidism. Denies palpitations or known afib since her last visit. She is in sinus by exam. She is taking Eliquis without issue. She is also on Propranolol. HR 62 today. Diabetes:  Goal hemoglobin A1c less than 7 is recommended from a cardiovascular standpoint. Last hemoglobin A1c on file is from November 2017 and is 7.4. Continue management through endocrinology. Hyperlipidemia:  Currently she is on Atorvastatin 40 mg daily. Last lipid profile was November with LDL 76. Hypertension:  Blood pressure today is 132/64 mmHg. Currently she is on Ramipril and Inderal.  I would recommend to continue both. Pre:op:  Patient is scheduled for ENT , nasal surgery   Currently she has no symptoms to suggest unstable coronary syndrome or decompensated heart failure. NO evidence of fluid overload on exam. NO presyncope or syncope  Functional status appears more than 4 METS based on history. Further cardiac work up not needed at this time. Patient will be at low-intermediate  risk for cardiac complications. DW patient in detail.   Advised to stop Apixaban at least 48 hours before surgery. Rest of cardiac medications should be continued medications perioperatively. Other than the above, or unless any additional issues arise, I have asked that she follow up in nine months.

## 2018-10-25 NOTE — PROGRESS NOTES
1. Have you been to the ER, urgent care clinic since your last visit? Hospitalized since your last visit? No     2. Have you seen or consulted any other health care providers outside of the [address] [place] [address] since your last visit? Include any pap smears or colon screening.  No

## 2018-10-29 RX ORDER — MONTELUKAST SODIUM 10 MG/1
TABLET ORAL
Qty: 90 TAB | Refills: 3 | Status: SHIPPED | OUTPATIENT
Start: 2018-10-29 | End: 2020-06-01 | Stop reason: ALTCHOICE

## 2018-11-12 ENCOUNTER — HOSPITAL ENCOUNTER (OUTPATIENT)
Dept: LAB | Age: 68
Discharge: HOME | End: 2018-11-12
Payer: COMMERCIAL

## 2018-11-12 LAB
T4 FREE SERPL-MCNC: 1.1 NG/DL (ref 0.7–1.5)
TSH SERPL DL<=0.05 MIU/L-ACNC: 1.8 UIU/ML (ref 0.36–3.74)

## 2018-11-12 PROCEDURE — 84439 ASSAY OF FREE THYROXINE: CPT

## 2018-11-12 PROCEDURE — 84443 ASSAY THYROID STIM HORMONE: CPT

## 2018-11-12 PROCEDURE — 36415 COLL VENOUS BLD VENIPUNCTURE: CPT

## 2018-11-19 ENCOUNTER — DOCUMENTATION ONLY (OUTPATIENT)
Dept: INTERNAL MEDICINE CLINIC | Age: 68
End: 2018-11-19

## 2018-11-19 ENCOUNTER — OFFICE VISIT (OUTPATIENT)
Dept: INTERNAL MEDICINE CLINIC | Age: 68
End: 2018-11-19

## 2018-11-19 ENCOUNTER — HOSPITAL ENCOUNTER (OUTPATIENT)
Dept: LAB | Age: 68
Discharge: HOME | End: 2018-11-19
Payer: COMMERCIAL

## 2018-11-19 VITALS
BODY MASS INDEX: 30.83 KG/M2 | SYSTOLIC BLOOD PRESSURE: 142 MMHG | DIASTOLIC BLOOD PRESSURE: 60 MMHG | HEIGHT: 63 IN | TEMPERATURE: 97.9 F | HEART RATE: 65 BPM | OXYGEN SATURATION: 97 % | WEIGHT: 174 LBS | RESPIRATION RATE: 16 BRPM

## 2018-11-19 DIAGNOSIS — Z23 ENCOUNTER FOR IMMUNIZATION: ICD-10-CM

## 2018-11-19 DIAGNOSIS — I11.9 BENIGN HYPERTENSIVE HEART DISEASE WITHOUT HEART FAILURE: ICD-10-CM

## 2018-11-19 DIAGNOSIS — E11.9 TYPE 2 DIABETES MELLITUS WITHOUT COMPLICATION, WITHOUT LONG-TERM CURRENT USE OF INSULIN (HCC): Primary | ICD-10-CM

## 2018-11-19 DIAGNOSIS — M81.0 AGE-RELATED OSTEOPOROSIS WITHOUT CURRENT PATHOLOGICAL FRACTURE: ICD-10-CM

## 2018-11-19 DIAGNOSIS — E78.49 OTHER HYPERLIPIDEMIA: ICD-10-CM

## 2018-11-19 DIAGNOSIS — E11.9 TYPE 2 DIABETES MELLITUS WITHOUT COMPLICATION, WITHOUT LONG-TERM CURRENT USE OF INSULIN (HCC): ICD-10-CM

## 2018-11-19 LAB
CHOLEST SERPL-MCNC: 144 MG/DL
CREAT UR-MCNC: 48 MG/DL (ref 30–125)
EST. AVERAGE GLUCOSE BLD GHB EST-MCNC: 169 MG/DL
HBA1C MFR BLD: 7.5 % (ref 4.2–5.6)
HDLC SERPL-MCNC: 50 MG/DL (ref 40–60)
HDLC SERPL: 2.9 {RATIO} (ref 0–5)
LDLC SERPL CALC-MCNC: 61.8 MG/DL (ref 0–100)
LIPID PROFILE,FLP: ABNORMAL
MICROALBUMIN UR-MCNC: 1.32 MG/DL (ref 0–3)
MICROALBUMIN/CREAT UR-RTO: 28 MG/G (ref 0–30)
TRIGL SERPL-MCNC: 161 MG/DL (ref ?–150)
VLDLC SERPL CALC-MCNC: 32.2 MG/DL

## 2018-11-19 PROCEDURE — 82043 UR ALBUMIN QUANTITATIVE: CPT

## 2018-11-19 PROCEDURE — 80061 LIPID PANEL: CPT

## 2018-11-19 PROCEDURE — 36415 COLL VENOUS BLD VENIPUNCTURE: CPT

## 2018-11-19 PROCEDURE — 83036 HEMOGLOBIN GLYCOSYLATED A1C: CPT

## 2018-11-19 NOTE — PROGRESS NOTES
HISTORY OF PRESENT ILLNESS  Radha Mcclain is a 77 y.o. female. 78 yo female here for f/u of DM, HTN. Had sinus surgery last month. Doing well. Has f/u with ENT this week. Is planning to schedule oral surgery and her surgeon is concerned that she is on actonel. Has been on this for 4 years. DM: stable on last A1c. Does not monitor at home. BP controlled. No CP, SOB. Hypertension    Pertinent negatives include no chest pain, no palpitations, no blurred vision, no headaches, no dizziness, no shortness of breath, no nausea and no vomiting. Diabetes   Pertinent negatives include no chest pain, no headaches and no shortness of breath. Immunization/Injection   Pertinent negatives include no chest pain, no headaches and no shortness of breath. Review of Systems   Constitutional: Negative for chills, fever and weight loss. HENT: Negative for congestion and ear pain. Eyes: Negative for blurred vision and pain. Respiratory: Negative for cough and shortness of breath. Cardiovascular: Negative for chest pain, palpitations and leg swelling. Gastrointestinal: Negative for nausea and vomiting. Genitourinary: Negative for frequency and urgency. Musculoskeletal: Negative for joint pain and myalgias. Skin: Negative for itching and rash. Neurological: Negative for dizziness, tingling and headaches. Psychiatric/Behavioral: Negative for depression. The patient is not nervous/anxious.       Past Medical History:   Diagnosis Date    Atrial fibrillation     CHADS score 2  (-CHF, +HTN, -AGE, +DM, -CVA)    Bilateral cataracts     Colon polyps     7/14  repeat 5 yrs - Dr. Christopher Parr    Diabetes     Dyslipidemia     Electronic cigarette use     Graves disease     5/14 Grave's disease, Dr. Dina Crowder History of seasonal allergies     Hypertension     Hypertension     Multiple thyroid nodules     5/14    Osteoporosis     last dexa 12/14    Plantar fasciitis, bilateral     Dr. Dean Branch apnea     mild sleep apnea, no CPAP    Vitamin D deficiency      Current Outpatient Medications on File Prior to Visit   Medication Sig Dispense Refill    montelukast (SINGULAIR) 10 mg tablet TAKE 1 TABLET DAILY 90 Tab 3    ramipril (ALTACE) 10 mg capsule Take 1 Cap by mouth daily. 90 Cap 3    atorvastatin (LIPITOR) 40 mg tablet TAKE 1 TABLET DAILY 90 Tab 2    naproxen (NAPROSYN) 500 mg tablet TAKE 1 TABLET TWICE DAILY  WITH MEALS FOR TENDONITIS 60 Tab 2    propranolol (INDERAL) 10 mg tablet TAKE 2 TABLETS TWICE A  Tab 3    metFORMIN (GLUCOPHAGE) 500 mg tablet Take 1 Tab by mouth two (2) times daily (with meals). 180 Tab 3    apixaban (ELIQUIS) 5 mg tablet Take 1 Tab by mouth two (2) times a day. 180 Tab 3    cholecalciferol, VITAMIN D3, (VITAMIN D3) 5,000 unit tab tablet Take 5,000 Units by mouth daily.  cetirizine (ZYRTEC) 10 mg tablet Take  by mouth daily.  methimazole (TAPAZOLE) 10 mg tablet Take 5 mg by mouth every Monday, Wednesday, Friday.  triamcinolone (NASACORT) 55 mcg nasal inhaler 1 [place] by Both Nostrils route daily as needed.  omega 3-dha-epa-fish oil 100-160-1,000 mg cap Take 1,000 mg by mouth two (2) times a day.  multivitamin (ONE A DAY) tablet Take 1 Tab by mouth daily.  acetaminophen (TYLENOL) 325 mg tablet Take  by mouth every four (4) hours as needed for Pain. No current facility-administered medications on file prior to visit. Physical Exam   Constitutional: She appears well-developed and well-nourished. No distress. /60 (BP 1 Location: Left arm, BP Patient Position: Sitting)   Pulse 65   Temp 97.9 °F (36.6 °C) (Oral)   Resp 16   Ht 5' 3\" (1.6 m)   Wt 174 lb (78.9 kg)   SpO2 97%   BMI 30.82 kg/m²      Eyes: EOM are normal. Right eye exhibits no discharge. Left eye exhibits no discharge. No scleral icterus. Neck: Neck supple. Cardiovascular: Normal rate, regular rhythm and normal heart sounds.  Exam reveals no gallop and no friction rub. No murmur heard. Pulmonary/Chest: Effort normal and breath sounds normal. No respiratory distress. She has no wheezes. She has no rales. Musculoskeletal: She exhibits no edema or tenderness. Lymphadenopathy:     She has no cervical adenopathy. Neurological: She is alert. She exhibits normal muscle tone. Skin: Skin is warm and dry. Psychiatric: She has a normal mood and affect. Lab Results   Component Value Date/Time    Hemoglobin A1c 7.4 (H) 02/16/2018 08:49 AM    Hemoglobin A1c (POC) 7.0 08/17/2018 10:00 AM     Lab Results   Component Value Date/Time    Cholesterol, total 162 02/16/2018 08:49 AM    HDL Cholesterol 46 02/16/2018 08:49 AM    LDL, calculated 76.4 02/16/2018 08:49 AM    VLDL, calculated 39.6 02/16/2018 08:49 AM    Triglyceride 198 (H) 02/16/2018 08:49 AM    CHOL/HDL Ratio 3.5 02/16/2018 08:49 AM     Lab Results   Component Value Date/Time    Sodium 140 10/23/2018 11:13 AM    Potassium 4.0 10/23/2018 11:13 AM    Chloride 107 10/23/2018 11:13 AM    CO2 26 10/23/2018 11:13 AM    Anion gap 7 10/23/2018 11:13 AM    Glucose 162 (H) 10/23/2018 11:13 AM    BUN 14 10/23/2018 11:13 AM    Creatinine 0.72 10/23/2018 11:13 AM    BUN/Creatinine ratio 19 10/23/2018 11:13 AM    GFR est AA >60 10/23/2018 11:13 AM    GFR est non-AA >60 10/23/2018 11:13 AM    Calcium 8.9 10/23/2018 11:13 AM    Bilirubin, total 0.7 02/16/2018 08:49 AM    AST (SGOT) 26 02/16/2018 08:49 AM    Alk. phosphatase 74 02/16/2018 08:49 AM    Protein, total 7.4 02/16/2018 08:49 AM    Albumin 4.0 02/16/2018 08:49 AM    Globulin 3.4 02/16/2018 08:49 AM    A-G Ratio 1.2 02/16/2018 08:49 AM    ALT (SGPT) 52 02/16/2018 08:49 AM     Lab Results   Component Value Date/Time    Microalbumin/Creat ratio (mg/g creat) 7 02/16/2018 08:49 AM    Microalbumin,urine random 0.70 02/16/2018 08:49 AM       ASSESSMENT and PLAN    ICD-10-CM ICD-9-CM    1.  Type 2 diabetes mellitus without complication, without long-term current use of insulin (HCC) E11.9 250.00 HEMOGLOBIN A1C WITH EAG      MICROALBUMIN, UR, RAND W/ MICROALB/CREAT RATIO   2. Encounter for immunization Z23 V03.89 INFLUENZA VACCINE INACTIVATED (IIV), SUBUNIT, ADJUVANTED, IM   3. Hypertension I11.9 402.10    4. Other hyperlipidemia E78.49 272.4 LIPID PANEL   5. Age-related osteoporosis without current pathological fracture M81.0 733.01 DEXA BONE DENSITY STUDY AXIAL     Repeat labs today. Will continue with current medication for now except hold Actonel. Repeat DEXA ordered.    Flu shot today  F/u with ENT as planned

## 2018-11-19 NOTE — PROGRESS NOTES
ROOM # 17  Identified pt with two pt identifiers(name and ). Reviewed record in preparation for visit and have obtained necessary documentation. Chief Complaint   Patient presents with    Hypertension    Diabetes    Immunization/Injection      Victorina Barriga preferred language for health care discussion is english/other. Is the patient using any DME equipment during OV? Romaine Oro is due for:  Health Maintenance Due   Topic    Shingrix Vaccine Age 50> (1 of 2)    BREAST CANCER SCRN MAMMOGRAM     MEDICARE YEARLY EXAM     Influenza Age 5 to Adult      Health Maintenance reviewed and discussed per provider  Please order/place referral if appropriate. Advance Directive:  1. Do you have an advance directive in place? Patient Reply: NO    2. If not, would you like material regarding how to put one in place? NO    Coordination of Care:  1. Have you been to the ER, urgent care clinic since your last visit? Hospitalized since your last visit? NO    2. Have you seen or consulted any other health care providers outside of the [address] [place] [address] since your last visit? Include any pap smears or colon screening. YES    Patient is accompanied by self I have received verbal consent from Victorina Barriga to discuss any/all medical information while they are present in the room.     Learning Assessment:  Learning Assessment 3/2/2016 2014 2014   PRIMARY LEARNER Patient Patient Patient   HIGHEST LEVEL OF EDUCATION - PRIMARY LEARNER  - 2 YEARS OF COLLEGE 2 YEARS OF COLLEGE   BARRIERS PRIMARY LEARNER - NONE NONE   CO-LEARNER CAREGIVER - No -   PRIMARY LANGUAGE ENGLISH ENGLISH ENGLISH   LEARNER PREFERENCE PRIMARY DEMONSTRATION DEMONSTRATION OTHER (COMMENT)     - LISTENING -     - PICTURES -   ANSWERED BY patient patient patient    RELATIONSHIP SELF SELF SELF     Depression Screening:  PHQ over the last two weeks 2018 10/25/2018 2018 2017 5/10/2017 1/10/2017 2016   Elisabeth interest or pleasure in doing things Not at all Not at all Not at all Several days Nearly every day Several days Several days   Feeling down, depressed, irritable, or hopeless Not at all Not at all Not at all Several days Several days Several days Several days   Total Score PHQ 2 0 0 0 2 4 2 2     Abuse Screening:  Abuse Screening Questionnaire 3/2/2016 11/25/2014   Do you ever feel afraid of your partner? N N   Are you in a relationship with someone who physically or mentally threatens you? N N   Is it safe for you to go home? Y Y     Fall Risk  Fall Risk Assessment, last 12 mths 11/19/2018 10/25/2018 8/17/2018 2/16/2018 8/14/2017 5/10/2017 1/10/2017   Able to walk? Yes Yes Yes Yes Yes Yes Yes   Fall in past 12 months?  No No No No No No No

## 2018-11-19 NOTE — PATIENT INSTRUCTIONS
Deciding About Bisphosphonate Medicine for Osteoporosis  Deciding About Bisphosphonate Medicine for Osteoporosis  What is osteoporosis? Osteoporosis is a disease that affects your bones. It means you have bones that are thin and brittle and have lots of holes inside them like a sponge. This makes them easy to break. It also increases your risk for spine and hip fractures. These fractures may make it hard for you to live on your own. Bisphosphonates are the most common medicines used to prevent bone loss. Most of the time, you take them as pills. They slow the way bone dissolves and is absorbed by your body. They can increase bone thickness and strength. What are key points about this decision? · If you have osteoporosis, these medicines can increase bone thickness. And they can lower your risk of spine and hip fractures. You may also want to think about taking them if you have osteopenia or risk factors for osteoporosis. · These medicines can have side effects, such as heartburn and belly pain. They also can cause headaches. Their long-term effects are not known. · Whether you take medicine or not, healthy habits can also help protect your bones. Talk to your doctor about taking calcium and vitamin D supplements. Get regular weight-bearing exercise. Cut back on alcohol. And if you smoke, quit. Why might you choose to take bisphosphonate medicines? · You have bone loss that is not normal for your age. · You have osteoporosis or osteopenia. Or you have risk factors for osteoporosis. · You have been taking hormone therapy (HT) and stopped. Bone loss medicines can protect against rapid bone loss after you quit HT. · You do not mind taking pills. Or you do not mind getting medicines through a tube in your vein, called an IV. · You think the benefits of taking these medicines outweigh the side effects. Why might you choose not to take these medicines?   · You want to try other medicines that help prevent bone loss. These include calcitonin (Calcimar or Miacalcin), denosumab (Prolia or Xgeva), hormone therapy, raloxifene (Evista), and teriparatide (Forteo). · You want to try healthy habits to prevent bone fractures. · You do not like the idea of taking pills. Or you do not like getting medicines through a tube in your vein, called an IV. · You are worried about the side effects of these medicines. Your decision  Thinking about the facts and your feelings can help you make a decision that is right for you. Be sure you understand the benefits and risks of your options, and think about what else you need to do before you make the decision. Where can you learn more? Go to http://nickolas-selvin.info/. Enter E240 in the search box to learn more about \"Deciding About Bisphosphonate Medicine for Osteoporosis. \"  Current as of: March 16, 2018  Content Version: 11.8  © 9458-1879 [employer]. Care instructions adapted under license by [employer] (which disclaims liability or warranty for this information). If you have questions about a medical condition or this instruction, always ask your healthcare professional. [address] any warranty or liability for your use of this information.

## 2018-11-20 ENCOUNTER — TELEPHONE (OUTPATIENT)
Dept: INTERNAL MEDICINE CLINIC | Age: 68
End: 2018-11-20

## 2018-11-20 RX ORDER — METFORMIN HYDROCHLORIDE 1000 MG/1
1000 TABLET ORAL 2 TIMES DAILY WITH MEALS
Qty: 180 TAB | Refills: 3 | Status: SHIPPED | OUTPATIENT
Start: 2018-11-20 | End: 2019-08-01 | Stop reason: SDUPTHER

## 2018-11-20 NOTE — PROGRESS NOTES
Please let her know her labs are stable except her A1c has increased to 7.5%. Is she willing to increase her metformin to 1000mg twice a day?

## 2018-11-20 NOTE — TELEPHONE ENCOUNTER
----- Message from Leodan Hernández MD sent at 11/20/2018  7:48 AM EST -----  Please let her know her labs are stable except her A1c has increased to 7.5%. Is she willing to increase her metformin to 1000mg twice a day?

## 2018-11-20 NOTE — TELEPHONE ENCOUNTER
2 Pt Identifiers verified. Notified result note. Verbalized understanding. Pt is agreeable to increasing dosage of Metformin.

## 2018-12-10 ENCOUNTER — HOSPITAL ENCOUNTER (OUTPATIENT)
Dept: GENERAL RADIOLOGY | Age: 68
Discharge: HOME | End: 2018-12-10
Attending: INTERNAL MEDICINE
Payer: COMMERCIAL

## 2018-12-10 DIAGNOSIS — M81.0 AGE-RELATED OSTEOPOROSIS WITHOUT CURRENT PATHOLOGICAL FRACTURE: ICD-10-CM

## 2018-12-10 PROCEDURE — 77080 DXA BONE DENSITY AXIAL: CPT

## 2019-01-21 RX ORDER — APIXABAN 5 MG/1
TABLET, FILM COATED ORAL
Qty: 180 TAB | Refills: 3 | Status: SHIPPED | OUTPATIENT
Start: 2019-01-21 | End: 2020-02-07 | Stop reason: SDUPTHER

## 2019-02-16 RX ORDER — RISEDRONATE SODIUM 35 MG/1
TABLET, FILM COATED ORAL
Qty: 12 TAB | Refills: 3 | Status: SHIPPED | OUTPATIENT
Start: 2019-02-16 | End: 2019-07-23

## 2019-02-16 RX ORDER — METFORMIN HYDROCHLORIDE 500 MG/1
TABLET ORAL
Qty: 180 TAB | Refills: 3 | Status: SHIPPED | OUTPATIENT
Start: 2019-02-16 | End: 2019-08-02 | Stop reason: DRUGHIGH

## 2019-03-17 RX ORDER — PROPRANOLOL HYDROCHLORIDE 10 MG/1
TABLET ORAL
Qty: 360 TAB | Refills: 3 | Status: SHIPPED | OUTPATIENT
Start: 2019-03-17 | End: 2020-07-01 | Stop reason: SDUPTHER

## 2019-03-19 ENCOUNTER — OFFICE VISIT (OUTPATIENT)
Dept: INTERNAL MEDICINE CLINIC | Age: 69
End: 2019-03-19

## 2019-03-19 VITALS
HEIGHT: 63 IN | TEMPERATURE: 96.7 F | OXYGEN SATURATION: 97 % | DIASTOLIC BLOOD PRESSURE: 56 MMHG | HEART RATE: 60 BPM | RESPIRATION RATE: 18 BRPM | BODY MASS INDEX: 29.95 KG/M2 | WEIGHT: 169 LBS | SYSTOLIC BLOOD PRESSURE: 142 MMHG

## 2019-03-19 DIAGNOSIS — M25.511 CHRONIC RIGHT SHOULDER PAIN: ICD-10-CM

## 2019-03-19 DIAGNOSIS — G89.29 CHRONIC RIGHT SHOULDER PAIN: ICD-10-CM

## 2019-03-19 DIAGNOSIS — I11.9 BENIGN HYPERTENSIVE HEART DISEASE WITHOUT HEART FAILURE: ICD-10-CM

## 2019-03-19 DIAGNOSIS — M81.0 AGE-RELATED OSTEOPOROSIS WITHOUT CURRENT PATHOLOGICAL FRACTURE: ICD-10-CM

## 2019-03-19 DIAGNOSIS — E11.9 TYPE 2 DIABETES MELLITUS WITHOUT COMPLICATION, WITHOUT LONG-TERM CURRENT USE OF INSULIN (HCC): Primary | ICD-10-CM

## 2019-03-19 LAB — HBA1C MFR BLD HPLC: 6.2 %

## 2019-03-19 NOTE — PROGRESS NOTES
Rm:16    Chief Complaint   Patient presents with    Diabetes     Depression Screening:  3 most recent [facility] Screens 3/19/2019 11/19/2018 10/25/2018 8/17/2018 8/14/2017 5/10/2017 1/10/2017   Little interest or pleasure in doing things Not at all Not at all Not at all Not at all Several days Nearly every day Several days   Feeling down, depressed, irritable, or hopeless Not at all Not at all Not at all Not at all Several days Several days Several days   Total Score PHQ 2 0 0 0 0 2 4 2       Learning Assessment:  Learning Assessment 3/2/2016 11/25/2014 5/20/2014   PRIMARY LEARNER Patient Patient Patient   HIGHEST LEVEL OF EDUCATION - PRIMARY LEARNER  - [id number] [employer] LEARNER - [facility] CAREGIVER - No -   [facility]   LEARNER PREFERENCE PRIMARY DEMONSTRATION DEMONSTRATION OTHER (COMMENT)     - LISTENING -     - PICTURES -   ANSWERED BY patient patient patient    RELATIONSHIP SELF SELF SELF       Abuse Screening:  Abuse Screening Questionnaire 3/2/2016 11/25/2014   Do you ever feel afraid of your partner? N N   Are you in a relationship with someone who physically or mentally threatens you? N N   Is it safe for you to go home? Y Y       Health Maintenance reviewed and discussed per provider: yes     Coordination of Care:    1. Have you been to the ER, urgent care clinic since your last visit? Hospitalized since your last visit? no    2. Have you seen or consulted any other health care providers outside of the [address] [place] [address] since your last visit? Include any pap smears or colon screening.  no

## 2019-03-19 NOTE — PATIENT INSTRUCTIONS
Osteoporosis: Care Instructions  Your Care Instructions    Osteoporosis causes bones to become thin and weak. It is much more common in women than in men. Osteoporosis may be very advanced before you know you have it. Sometimes the first sign is a broken bone in the hip, spine, or wrist or sudden pain in your middle or lower back. Follow-up care is a key part of your treatment and safety. Be sure to make and go to all appointments, and call your doctor if you are having problems. It's also a good idea to know your test results and keep a list of the medicines you take. How can you care for yourself at home? · Your doctor may prescribe a bisphosphonate, such as risedronate (Actonel) or alendronate (Fosamax), for osteoporosis. If you are taking one of these medicines by mouth:  ? Take your medicine with a full glass of water when you first get up in the morning. ? Do not lie down, eat, drink a beverage, or take any other medicine for at least 30 minutes after taking the drug. This helps prevent stomach problems. ? Do not take your medicine late in the day if you forgot to take it in the morning. Skip it, and take the usual dose the next morning. ? If you have side effects, tell your doctor. He or she may prescribe another medicine. · Get enough calcium and vitamin D. The [place] of Medicine recommends adults younger than age 46 need 1,000 mg of calcium and 600 IU of vitamin D each day. Women ages 46 to 79 need 1,200 mg of calcium and 600 IU of vitamin D each day. Men ages 46 to 79 need 1,000 mg of calcium and 600 IU of vitamin D each day. Adults 71 and older need 1,200 mg of calcium and 800 IU of vitamin D each day. ? Eat foods rich in calcium, like yogurt, cheese, milk, and dark green vegetables. This is a good way to get the calcium you need. You can get vitamin D from eggs, fatty fish, cereal, and milk. ? Talk to your doctor about taking a calcium plus vitamin D supplement. Be careful, though. Adults ages 23 to 48 should not get more than 2,500 mg of calcium and 4,000 IU of vitamin D each day, whether it is from supplements and/or food. Adults ages 46 and older should not get more than 2,000 mg of calcium and 4,000 IU of vitamin D each day from supplements and/or food. · Limit alcohol to 2 drinks a day for men and 1 drink a day for women. Too much alcohol can cause health problems. · Do not smoke. Smoking puts you at a much higher risk for osteoporosis. If you need help quitting, talk to your doctor about stop-smoking programs and medicines. These can increase your chances of quitting for good. · Get regular bone-building exercise. Weight-bearing and resistance exercises keep bones healthy by working the muscles and bones against gravity. Start out at an exercise level that feels right for you. Add a little at a time until you can do the following:  ? Do 30 minutes of weight-bearing exercise on most days of the week. Walking, jogging, stair climbing, and dancing are good choices. ? Do resistance exercises with weights or elastic bands 2 to 3 days a week. · Reduce your risk of falls:  ? Wear supportive shoes with low heels and nonslip soles. ? Use a cane or walker, if you need it. Use shower chairs and bath benches. Put in handrails on stairways, around your shower or tub area, and near the toilet. ? Keep stairs, porches, and walkways well lit. Use night-lights. ? Remove throw rugs and other objects that are in the way. ? Avoid icy, wet, or slippery surfaces. ? Keep a cordless phone and a flashlight with new batteries by your bed. When should you call for help? Watch closely for changes in your health, and be sure to contact your doctor if you have any problems. Where can you learn more? Go to http://nickolas-selvin.info/. Enter K100 in the search box to learn more about \"Osteoporosis: Care Instructions. \"  Current as of: March 15, 2018  Content Version: 11.9  © 9845-0758 [employer], [employer]. Care instructions adapted under license by [employer] (which disclaims liability or warranty for this information). If you have questions about a medical condition or this instruction, always ask your healthcare professional. [address] any warranty or liability for your use of this information. Denosumab (By injection)   Denosumab ([id number])  Treats osteoporosis, bone cancer, hypercalcemia, and other bone problems in patients who have cancer. Brand Name(s): Prolia, Xgeva   There may be other brand names for this medicine. When This Medicine Should Not Be Used: This medicine is not right for everyone. You should not receive it if you had an allergic reaction to denosumab or if you are pregnant. How to Use This Medicine:   Injectable  · A doctor or other health professional will give you this medicine. This medicine is usually given as a shot under the skin of your upper arm, upper thigh, or stomach. · This medicine should come with a Medication Guide. Ask your pharmacist for a copy if you do not have one. · Missed dose: Call your doctor or pharmacist for instructions. Drugs and Foods to Avoid:   Ask your doctor or pharmacist before using any other medicine, including over-the-counter medicines, vitamins, and herbal products. · Do not use Prolia® and Xgeva® together. They contain the same medicine. · Some medicines can affect how denosumab works. Tell your doctor if you are also using medicine that weakens your immune system, including a steroid or cancer medicine. Warnings While Using This Medicine:   · This medicine may cause birth defects if either partner is using it during conception or pregnancy. Tell your doctor right away if you or your partner becomes pregnant. Use an effective form of birth control. Women who are being treated with Sharon Florez should continue using birth control for at least 5 months after the last dose.   · Tell your doctor if you are breastfeeding, or if you have kidney disease, diabetes, gum disease, or an allergy to latex. Tell your doctor if you have problems with your thyroid, parathyroid, or digestive system. · This medicine may cause the following problems:  ¨ Low calcium levels in your blood  ¨ Increased risk of broken thigh bone  ¨ Increased risk of infections  ¨ Serious skin reactions  ¨ Severe bone, joint, or muscle pain  · This medicine can cause jaw problems. You must have regular dental exams while you are being treated with this medicine. Tell your dentist that you are using this medicine. Practice good oral hygiene. · Do not suddenly stop using Prolia® without checking first with your doctor. Doing so may increase risk for more fractures. Talk to your doctor about other medicine that you can take. · Your doctor will do lab tests at regular visits to check on the effects of this medicine. Keep all appointments.   Possible Side Effects While Using This Medicine:   Call your doctor right away if you notice any of these side effects:  · Allergic reaction: Itching or hives, swelling in your face or hands, swelling or tingling in your mouth or throat, chest tightness, trouble breathing  · Blistering, peeling, red skin rash  · Chest pain, fast or uneven heartbeat, trouble breathing  · Fever, chills, cough, sore throat, body aches  · Lightheadedness, dizziness, fainting  · Muscle spasms or twitching, numbness or tingling in your fingers, toes, or lips  · Pain or burning during urination, change in how much or how often you urinate  · Pain, swelling, heavy feeling, or numbness in your mouth or jaw, loose teeth or other teeth problems  · Severe bone, joint, or muscle pain  · Unusual pain in your thigh, groin, or hip  If you notice these less serious side effects, talk with your doctor:   · Diarrhea, nausea  · Redness, pain, itching, burning, swelling, or a lump under your skin where the shot was given  · Tiredness or weakness  If you notice other side effects that you think are caused by this medicine, tell your doctor. Call your doctor for medical advice about side effects. You may report side effects to FDA at 4-852-IHL-8836  © 2017 [facility] Information is for End User's use only and may not be sold, redistributed or otherwise used for commercial purposes. The above information is an  only. It is not intended as medical advice for individual conditions or treatments. Talk to your doctor, nurse or pharmacist before following any medical regimen to see if it is safe and effective for you. Shoulder Stretches: Exercises  Your Care Instructions  Here are some examples of exercises for your shoulder. Start each exercise slowly. Ease off the exercise if you start to have pain. Your doctor or physical therapist will tell you when you can start these exercises and which ones will work best for you. How to do the exercises  Shoulder stretch    1.  a doorway and place one arm against the door frame. Your elbow should be a little higher than your shoulder. 2. Relax your shoulders as you lean forward, allowing your chest and shoulder muscles to stretch. You can also turn your body slightly away from your arm to stretch the muscles even more. 3. Hold for 15 to 30 seconds. 4. Repeat 2 to 4 times with each arm. Shoulder and chest stretch    1. Shoulder and chest stretch  2. While sitting, relax your upper body so you slump slightly in your chair. 3. As you breathe in, straighten your back and open your arms out to the sides. 4. Gently pull your shoulder blades back and downward. 5. Hold for 15 to 30 seconds as your breathe normally. 6. Repeat 2 to 4 times. Overhead stretch    1. Reach up over your head with both arms. 2. Hold for 15 to 30 seconds. 3. Repeat 2 to 4 times. Follow-up care is a key part of your treatment and safety.  Be sure to make and go to all appointments, and call your doctor if you are having problems. It's also a good idea to know your test results and keep a list of the medicines you take. Where can you learn more? Go to http://nickolas-selvin.info/. Enter S254 in the search box to learn more about \"Shoulder Stretches: Exercises. \"  Current as of: September 20, 2018  Content Version: 11.9  © 5389-6454 [employer], [employer]. Care instructions adapted under license by [employer] (which disclaims liability or warranty for this information). If you have questions about a medical condition or this instruction, always ask your healthcare professional. [address] any warranty or liability for your use of this information.

## 2019-03-19 NOTE — PROGRESS NOTES
HISTORY OF PRESENT ILLNESS  Reather Seip is a 77 y.o. female. 76 yo female here for f/u of DM, HTN, OP. Chronic R shoulder pain persists. Prior surgery in 1990s. Some decreased ROM with abduction. Naprosyn not helping. HTN relatively stable. No CP, SOB  DM: does not check at home. Last A1c had increased a little. Metformin increased to 1000mg BID. Tolerating without sig issues. OP: improvement on recent DEXA. Holding Actonel. Review of Systems   Constitutional: Negative for chills, fever and weight loss. HENT: Negative for congestion and ear pain. Eyes: Negative for blurred vision and pain. Respiratory: Negative for cough and shortness of breath. Cardiovascular: Negative for chest pain, palpitations and leg swelling. Gastrointestinal: Negative for nausea and vomiting. Genitourinary: Negative for frequency and urgency. Musculoskeletal: Positive for joint pain. Negative for myalgias. Skin: Negative for itching and rash. Neurological: Negative for dizziness, tingling and headaches. Psychiatric/Behavioral: Negative for depression. The patient is not nervous/anxious.       Past Medical History:   Diagnosis Date    Atrial fibrillation     CHADS score 2  (-CHF, +HTN, -AGE, +DM, -CVA)    Bilateral cataracts     Colon polyps     7/14  repeat 5 yrs - Dr. Candance Sauer    Diabetes     Dyslipidemia     Electronic cigarette use     Graves disease     5/14 Grave's disease, Dr. Domenica Chavez History of seasonal allergies     Hypertension     Hypertension     Multiple thyroid nodules     5/14    Osteoporosis     last dexa 12/14    Plantar fasciitis, bilateral     Dr. Beryl Adams Sleep apnea     mild sleep apnea, no CPAP    Vitamin D deficiency      Current Outpatient Medications on File Prior to Visit   Medication Sig Dispense Refill    propranolol (INDERAL) 10 mg tablet TAKE 2 TABLETS TWICE A  Tab 3    risedronate (ACTONEL) 35 mg tablet TAKE 1 TABLET EVERY 7 DAYS 12 Tab 3    metFORMIN (GLUCOPHAGE) 500 mg tablet TAKE 1 TABLET TWICE DAILY  WITH MEALS 180 Tab 3    ELIQUIS 5 mg tablet TAKE 1 TABLET TWICE A  Tab 3    metFORMIN (GLUCOPHAGE) 1,000 mg tablet Take 1 Tab by mouth two (2) times daily (with meals). 180 Tab 3    montelukast (SINGULAIR) 10 mg tablet TAKE 1 TABLET DAILY 90 Tab 3    ramipril (ALTACE) 10 mg capsule Take 1 Cap by mouth daily. 90 Cap 3    atorvastatin (LIPITOR) 40 mg tablet TAKE 1 TABLET DAILY 90 Tab 2    cholecalciferol, VITAMIN D3, (VITAMIN D3) 5,000 unit tab tablet Take 5,000 Units by mouth daily.  cetirizine (ZYRTEC) 10 mg tablet Take  by mouth daily.  methimazole (TAPAZOLE) 10 mg tablet Take 5 mg by mouth every Monday, Wednesday, Friday.  triamcinolone (NASACORT) 55 mcg nasal inhaler 1 [place] by Both Nostrils route daily as needed.  omega 3-dha-epa-fish oil 100-160-1,000 mg cap Take 1,000 mg by mouth two (2) times a day.  multivitamin (ONE A DAY) tablet Take 1 Tab by mouth daily.  acetaminophen (TYLENOL) 325 mg tablet Take  by mouth every four (4) hours as needed for Pain. No current facility-administered medications on file prior to visit. Physical Exam   Constitutional: She appears well-developed and well-nourished. No distress. /56 (BP 1 Location: Right arm, BP Patient Position: Sitting)   Pulse 60   Temp 96.7 °F (35.9 °C) (Oral)   Resp 18   Ht 5' 3\" (1.6 m)   Wt 169 lb (76.7 kg)   SpO2 97%   BMI 29.94 kg/m²      Eyes: EOM are normal. Right eye exhibits no discharge. Left eye exhibits no discharge. No scleral icterus. Neck: Neck supple. Cardiovascular: Normal rate, regular rhythm and normal heart sounds. Exam reveals no gallop and no friction rub. No murmur heard. Pulmonary/Chest: Effort normal and breath sounds normal. No respiratory distress. She has no wheezes. She has no rales. Musculoskeletal: She exhibits no edema or tenderness.         Right shoulder: She exhibits normal range of motion and no tenderness. Lymphadenopathy:     She has no cervical adenopathy. Neurological: She is alert. She exhibits normal muscle tone. Skin: Skin is warm and dry. Psychiatric: She has a normal mood and affect. Lab Results   Component Value Date/Time    Hemoglobin A1c 7.5 (H) 11/19/2018 09:03 AM    Hemoglobin A1c (POC) 7.0 08/17/2018 10:00 AM     Lab Results   Component Value Date/Time    Sodium 140 10/23/2018 11:13 AM    Potassium 4.0 10/23/2018 11:13 AM    Chloride 107 10/23/2018 11:13 AM    CO2 26 10/23/2018 11:13 AM    Anion gap 7 10/23/2018 11:13 AM    Glucose 162 (H) 10/23/2018 11:13 AM    BUN 14 10/23/2018 11:13 AM    Creatinine 0.72 10/23/2018 11:13 AM    BUN/Creatinine ratio 19 10/23/2018 11:13 AM    GFR est AA >60 10/23/2018 11:13 AM    GFR est non-AA >60 10/23/2018 11:13 AM    Calcium 8.9 10/23/2018 11:13 AM    Bilirubin, total 0.7 02/16/2018 08:49 AM    AST (SGOT) 26 02/16/2018 08:49 AM    Alk. phosphatase 74 02/16/2018 08:49 AM    Protein, total 7.4 02/16/2018 08:49 AM    Albumin 4.0 02/16/2018 08:49 AM    Globulin 3.4 02/16/2018 08:49 AM    A-G Ratio 1.2 02/16/2018 08:49 AM    ALT (SGPT) 52 02/16/2018 08:49 AM     Lab Results   Component Value Date/Time    Cholesterol, total 144 11/19/2018 09:03 AM    HDL Cholesterol 50 11/19/2018 09:03 AM    LDL, calculated 61.8 11/19/2018 09:03 AM    VLDL, calculated 32.2 11/19/2018 09:03 AM    Triglyceride 161 (H) 11/19/2018 09:03 AM    CHOL/HDL Ratio 2.9 11/19/2018 09:03 AM     ASSESSMENT and PLAN    ICD-10-CM ICD-9-CM    1. Type 2 diabetes mellitus without complication, without long-term current use of insulin (HCC) E11.9 250.00 AMB POC HEMOGLOBIN A1C   2. Age-related osteoporosis without current pathological fracture M81.0 733.01    3. Hypertension I11.9 402.10    4. Chronic right shoulder pain M25.511 719.41     G89.29 338.29      Repeat A1c today improved to 6.2%. Will continue with current medication. Consider adding Prolia in the future.  Provided info on AVS.   Declines PT at this time. Continue to work on ROM exercises.

## 2019-07-23 ENCOUNTER — OFFICE VISIT (OUTPATIENT)
Dept: INTERNAL MEDICINE CLINIC | Age: 69
End: 2019-07-23

## 2019-07-23 ENCOUNTER — HOSPITAL ENCOUNTER (OUTPATIENT)
Dept: LAB | Age: 69
Discharge: HOME | End: 2019-07-23
Payer: MEDICARE

## 2019-07-23 VITALS
WEIGHT: 164.8 LBS | BODY MASS INDEX: 29.2 KG/M2 | HEART RATE: 57 BPM | HEIGHT: 63 IN | SYSTOLIC BLOOD PRESSURE: 144 MMHG | DIASTOLIC BLOOD PRESSURE: 63 MMHG | RESPIRATION RATE: 18 BRPM | OXYGEN SATURATION: 96 % | TEMPERATURE: 96.8 F

## 2019-07-23 DIAGNOSIS — E78.49 OTHER HYPERLIPIDEMIA: ICD-10-CM

## 2019-07-23 DIAGNOSIS — I11.9 BENIGN HYPERTENSIVE HEART DISEASE WITHOUT HEART FAILURE: ICD-10-CM

## 2019-07-23 DIAGNOSIS — E11.9 TYPE 2 DIABETES MELLITUS WITHOUT COMPLICATION, WITHOUT LONG-TERM CURRENT USE OF INSULIN (HCC): Primary | ICD-10-CM

## 2019-07-23 DIAGNOSIS — M81.0 AGE-RELATED OSTEOPOROSIS WITHOUT CURRENT PATHOLOGICAL FRACTURE: ICD-10-CM

## 2019-07-23 DIAGNOSIS — E11.9 TYPE 2 DIABETES MELLITUS WITHOUT COMPLICATION, WITHOUT LONG-TERM CURRENT USE OF INSULIN (HCC): ICD-10-CM

## 2019-07-23 LAB
ALBUMIN SERPL-MCNC: 3.8 G/DL (ref 3.4–5)
ALBUMIN/GLOB SERPL: 1.4 {RATIO} (ref 0.8–1.7)
ALP SERPL-CCNC: 77 U/L (ref 45–117)
ALT SERPL-CCNC: 24 U/L (ref 13–56)
ANION GAP SERPL CALC-SCNC: 7 MMOL/L (ref 3–18)
AST SERPL-CCNC: 12 U/L (ref 10–38)
BASOPHILS # BLD: 0 K/UL (ref 0–0.1)
BASOPHILS NFR BLD: 1 % (ref 0–2)
BILIRUB SERPL-MCNC: 0.7 MG/DL (ref 0.2–1)
BUN SERPL-MCNC: 14 MG/DL (ref 7–18)
BUN/CREAT SERPL: 23 (ref 12–20)
CALCIUM SERPL-MCNC: 8.9 MG/DL (ref 8.5–10.1)
CHLORIDE SERPL-SCNC: 109 MMOL/L (ref 100–111)
CHOLEST SERPL-MCNC: 120 MG/DL
CO2 SERPL-SCNC: 29 MMOL/L (ref 21–32)
CREAT SERPL-MCNC: 0.62 MG/DL (ref 0.6–1.3)
CREAT UR-MCNC: 160 MG/DL (ref 30–125)
DIFFERENTIAL METHOD BLD: ABNORMAL
EOSINOPHIL # BLD: 0.4 K/UL (ref 0–0.4)
EOSINOPHIL NFR BLD: 6 % (ref 0–5)
ERYTHROCYTE [DISTWIDTH] IN BLOOD BY AUTOMATED COUNT: 13.3 % (ref 11.6–14.5)
EST. AVERAGE GLUCOSE BLD GHB EST-MCNC: 123 MG/DL
GLOBULIN SER CALC-MCNC: 2.8 G/DL (ref 2–4)
GLUCOSE SERPL-MCNC: 88 MG/DL (ref 74–99)
HBA1C MFR BLD: 5.9 % (ref 4.2–5.6)
HCT VFR BLD AUTO: 38.9 % (ref 35–45)
HDLC SERPL-MCNC: 54 MG/DL (ref 40–60)
HDLC SERPL: 2.2 {RATIO} (ref 0–5)
HGB BLD-MCNC: 13 G/DL (ref 12–16)
LDLC SERPL CALC-MCNC: 45.8 MG/DL (ref 0–100)
LIPID PROFILE,FLP: NORMAL
LYMPHOCYTES # BLD: 2.2 K/UL (ref 0.9–3.6)
LYMPHOCYTES NFR BLD: 35 % (ref 21–52)
MCH RBC QN AUTO: 29 PG (ref 24–34)
MCHC RBC AUTO-ENTMCNC: 33.4 G/DL (ref 31–37)
MCV RBC AUTO: 86.8 FL (ref 74–97)
MICROALBUMIN UR-MCNC: 2.65 MG/DL (ref 0–3)
MICROALBUMIN/CREAT UR-RTO: 17 MG/G (ref 0–30)
MONOCYTES # BLD: 0.3 K/UL (ref 0.05–1.2)
MONOCYTES NFR BLD: 6 % (ref 3–10)
NEUTS SEG # BLD: 3.3 K/UL (ref 1.8–8)
NEUTS SEG NFR BLD: 52 % (ref 40–73)
PLATELET # BLD AUTO: 296 K/UL (ref 135–420)
PMV BLD AUTO: 11.2 FL (ref 9.2–11.8)
POTASSIUM SERPL-SCNC: 4.1 MMOL/L (ref 3.5–5.5)
PROT SERPL-MCNC: 6.6 G/DL (ref 6.4–8.2)
RBC # BLD AUTO: 4.48 M/UL (ref 4.2–5.3)
SODIUM SERPL-SCNC: 145 MMOL/L (ref 136–145)
TRIGL SERPL-MCNC: 101 MG/DL (ref ?–150)
VLDLC SERPL CALC-MCNC: 20.2 MG/DL
WBC # BLD AUTO: 6.2 K/UL (ref 4.6–13.2)

## 2019-07-23 PROCEDURE — 36415 COLL VENOUS BLD VENIPUNCTURE: CPT

## 2019-07-23 PROCEDURE — 85025 COMPLETE CBC W/AUTO DIFF WBC: CPT

## 2019-07-23 PROCEDURE — 80053 COMPREHEN METABOLIC PANEL: CPT

## 2019-07-23 PROCEDURE — 82043 UR ALBUMIN QUANTITATIVE: CPT

## 2019-07-23 PROCEDURE — 80061 LIPID PANEL: CPT

## 2019-07-23 PROCEDURE — 83036 HEMOGLOBIN GLYCOSYLATED A1C: CPT

## 2019-07-23 NOTE — PATIENT INSTRUCTIONS

## 2019-07-23 NOTE — PROGRESS NOTES
Rm: 16    Chief Complaint   Patient presents with    Hypertension    Diabetes     Depression Screening:  3 most recent PHQ Screens 7/23/2019 3/19/2019 11/19/2018 10/25/2018 8/17/2018 8/14/2017 5/10/2017   Little interest or pleasure in doing things Not at all Not at all Not at all Not at all Not at all Several days Nearly every day   Feeling down, depressed, irritable, or hopeless Not at all Not at all Not at all Not at all Not at all Several days Several days   Total Score PHQ 2 0 0 0 0 0 2 4       Learning Assessment:  Learning Assessment 3/2/2016 11/25/2014 5/20/2014   PRIMARY LEARNER Patient Patient Patient   HIGHEST LEVEL OF EDUCATION - PRIMARY LEARNER  - [id number] [employer] LEARNER - [facility] CAREG[facility] - No -   [address] [facility]   LEARNER PREFERENCE PRIMARY DEMONSTRATION DEMONSTRATION OTHER (COMMENT)     - LISTENING -     - PICTURES -   ANSWERED BY patient patient patient    RELATIONSHIP SELF SELF SELF       Abuse Screening:  Abuse Screening Questionnaire 3/2/2016 11/25/2014   Do you ever feel afraid of your partner? N N   Are you in a relationship with someone who physically or mentally threatens you? N N   Is it safe for you to go home? Y Y       Health Maintenance reviewed and discussed per provider: yes     Coordination of Care:    1. Have you been to the ER, urgent care clinic since your last visit? Hospitalized since your last visit? no    2. Have you seen or consulted any other health care providers outside of the [address] [place] [address] since your last visit? Include any pap smears or colon screening.  no

## 2019-07-23 NOTE — PROGRESS NOTES
HISTORY OF PRESENT ILLNESS  Masha Dominguez is a 77 y.o. female. Here for f/u of DM, HTN  DM: does not check at home but has been eating more sweets lately. A1c has been stable. Has lost some weight  HTN stable. No CP, SOB  OP: willing to try Prolia    Hypertension    Pertinent negatives include no chest pain, no palpitations, no blurred vision, no headaches, no dizziness, no shortness of breath, no nausea and no vomiting. Diabetes   Pertinent negatives include no chest pain, no headaches and no shortness of breath. Review of Systems   Constitutional: Negative for chills, fever and weight loss. HENT: Negative for congestion and ear pain. Eyes: Negative for blurred vision and pain. Respiratory: Negative for cough and shortness of breath. Cardiovascular: Negative for chest pain, palpitations and leg swelling. Gastrointestinal: Negative for nausea and vomiting. Genitourinary: Negative for frequency and urgency. Musculoskeletal: Negative for joint pain and myalgias. Skin: Negative for itching and rash. Neurological: Negative for dizziness, tingling and headaches. Psychiatric/Behavioral: Negative for depression. The patient is not nervous/anxious.       Past Medical History:   Diagnosis Date    Atrial fibrillation     CHADS score 2  (-CHF, +HTN, -AGE, +DM, -CVA)    Bilateral cataracts     Colon polyps     7/14  repeat 5 yrs - Dr. Dominique Daugherty    Diabetes     Dyslipidemia     Electronic cigarette use     Graves disease     5/14 Grave's disease, Dr. Saleem Alexander History of seasonal allergies     Hypertension     Hypertension     Multiple thyroid nodules     5/14    Osteoporosis     last dexa 12/14    Plantar fasciitis, bilateral     Dr. Jannie Meehan Sleep apnea     mild sleep apnea, no CPAP    Vitamin D deficiency      Current Outpatient Medications on File Prior to Visit   Medication Sig Dispense Refill    propranolol (INDERAL) 10 mg tablet TAKE 2 TABLETS TWICE A  Tab 3    risedronate (ACTONEL) 35 mg tablet TAKE 1 TABLET EVERY 7 DAYS 12 Tab 3    metFORMIN (GLUCOPHAGE) 500 mg tablet TAKE 1 TABLET TWICE DAILY  WITH MEALS 180 Tab 3    ELIQUIS 5 mg tablet TAKE 1 TABLET TWICE A  Tab 3    metFORMIN (GLUCOPHAGE) 1,000 mg tablet Take 1 Tab by mouth two (2) times daily (with meals). 180 Tab 3    montelukast (SINGULAIR) 10 mg tablet TAKE 1 TABLET DAILY 90 Tab 3    ramipril (ALTACE) 10 mg capsule Take 1 Cap by mouth daily. 90 Cap 3    atorvastatin (LIPITOR) 40 mg tablet TAKE 1 TABLET DAILY 90 Tab 2    cholecalciferol, VITAMIN D3, (VITAMIN D3) 5,000 unit tab tablet Take 5,000 Units by mouth daily.  cetirizine (ZYRTEC) 10 mg tablet Take  by mouth daily.  methimazole (TAPAZOLE) 10 mg tablet Take 5 mg by mouth every Monday, Wednesday, Friday.  triamcinolone (NASACORT) 55 mcg nasal inhaler 1 [place] by Both Nostrils route daily as needed.  omega 3-dha-epa-fish oil 100-160-1,000 mg cap Take 1,000 mg by mouth two (2) times a day.  multivitamin (ONE A DAY) tablet Take 1 Tab by mouth daily.  acetaminophen (TYLENOL) 325 mg tablet Take  by mouth every four (4) hours as needed for Pain. No current facility-administered medications on file prior to visit. Physical Exam   Constitutional: She appears well-developed and well-nourished. No distress. /63 (BP 1 Location: Left arm, BP Patient Position: Sitting)   Pulse (!) 57   Temp 96.8 °F (36 °C) (Oral)   Resp 18   Ht 5' 3\" (1.6 m)   Wt 164 lb 12.8 oz (74.8 kg)   SpO2 96%   BMI 29.19 kg/m²      Eyes: EOM are normal. Right eye exhibits no discharge. Left eye exhibits no discharge. No scleral icterus. Neck: Neck supple. Cardiovascular: Normal rate, regular rhythm and normal heart sounds. Exam reveals no gallop and no friction rub. No murmur heard. Pulmonary/Chest: Effort normal and breath sounds normal. No respiratory distress. She has no wheezes. She has no rales.    Musculoskeletal: She exhibits no edema or tenderness. Lymphadenopathy:     She has no cervical adenopathy. Neurological: She is alert. She exhibits normal muscle tone. Skin: Skin is warm and dry. Psychiatric: She has a normal mood and affect. Lab Results   Component Value Date/Time    Cholesterol, total 144 11/19/2018 09:03 AM    HDL Cholesterol 50 11/19/2018 09:03 AM    LDL, calculated 61.8 11/19/2018 09:03 AM    VLDL, calculated 32.2 11/19/2018 09:03 AM    Triglyceride 161 (H) 11/19/2018 09:03 AM    CHOL/HDL Ratio 2.9 11/19/2018 09:03 AM     Lab Results   Component Value Date/Time    Hemoglobin A1c 7.5 (H) 11/19/2018 09:03 AM    Hemoglobin A1c (POC) 6.2 03/19/2019 09:06 AM     Lab Results   Component Value Date/Time    Sodium 140 10/23/2018 11:13 AM    Potassium 4.0 10/23/2018 11:13 AM    Chloride 107 10/23/2018 11:13 AM    CO2 26 10/23/2018 11:13 AM    Anion gap 7 10/23/2018 11:13 AM    Glucose 162 (H) 10/23/2018 11:13 AM    BUN 14 10/23/2018 11:13 AM    Creatinine 0.72 10/23/2018 11:13 AM    BUN/Creatinine ratio 19 10/23/2018 11:13 AM    GFR est AA >60 10/23/2018 11:13 AM    GFR est non-AA >60 10/23/2018 11:13 AM    Calcium 8.9 10/23/2018 11:13 AM    Bilirubin, total 0.7 02/16/2018 08:49 AM    AST (SGOT) 26 02/16/2018 08:49 AM    Alk. phosphatase 74 02/16/2018 08:49 AM    Protein, total 7.4 02/16/2018 08:49 AM    Albumin 4.0 02/16/2018 08:49 AM    Globulin 3.4 02/16/2018 08:49 AM    A-G Ratio 1.2 02/16/2018 08:49 AM    ALT (SGPT) 52 02/16/2018 08:49 AM     Lab Results   Component Value Date/Time    Microalbumin/Creat ratio (mg/g creat) 28 11/19/2018 09:03 AM    Microalbumin,urine random 1.32 11/19/2018 09:03 AM       ASSESSMENT and PLAN    ICD-10-CM ICD-9-CM    1.  Type 2 diabetes mellitus without complication, without long-term current use of insulin (HCC) E11.9 250.00 HEMOGLOBIN A1C WITH EAG      MICROALBUMIN, UR, RAND W/ MICROALB/CREAT RATIO   2. Age-related osteoporosis without current pathological fracture M81.0 733.01    3. Other hyperlipidemia E78.49 272.4 LIPID PANEL   4. Hypertension I11.9 402.10 CBC WITH AUTOMATED DIFF      METABOLIC PANEL, COMPREHENSIVE     Repeat labs today. Will continue with current medication for now and begin Prolia for OP  Continue to work on dietary changes.

## 2019-08-02 RX ORDER — ATORVASTATIN CALCIUM 40 MG/1
40 TABLET, FILM COATED ORAL DAILY
Qty: 90 TAB | Refills: 2 | Status: SHIPPED | OUTPATIENT
Start: 2019-08-02 | End: 2020-07-01 | Stop reason: SDUPTHER

## 2019-08-02 RX ORDER — METFORMIN HYDROCHLORIDE 1000 MG/1
1000 TABLET ORAL 2 TIMES DAILY WITH MEALS
Qty: 180 TAB | Refills: 3 | Status: SHIPPED | OUTPATIENT
Start: 2019-08-02 | End: 2020-08-31 | Stop reason: SDUPTHER

## 2019-08-29 ENCOUNTER — HOSPITAL ENCOUNTER (OUTPATIENT)
Dept: INFUSION THERAPY | Age: 69
Discharge: HOME | End: 2019-08-29
Payer: MEDICARE

## 2019-08-29 VITALS
TEMPERATURE: 97.1 F | RESPIRATION RATE: 17 BRPM | DIASTOLIC BLOOD PRESSURE: 70 MMHG | HEART RATE: 53 BPM | OXYGEN SATURATION: 98 % | SYSTOLIC BLOOD PRESSURE: 125 MMHG

## 2019-08-29 LAB
ANION GAP BLD CALC-SCNC: 16 MMOL/L (ref 10–20)
BUN BLD-MCNC: 12 MG/DL (ref 7–18)
CA-I BLD-MCNC: 1.18 MMOL/L (ref 1.12–1.32)
CHLORIDE BLD-SCNC: 106 MMOL/L (ref 100–108)
CO2 BLD-SCNC: 24 MMOL/L (ref 19–24)
CREAT UR-MCNC: 0.5 MG/DL (ref 0.6–1.3)
GLUCOSE BLD STRIP.AUTO-MCNC: 127 MG/DL (ref 74–106)
HCT VFR BLD CALC: 41 % (ref 36–49)
HGB BLD-MCNC: 13.9 G/DL (ref 12–16)
MAGNESIUM SERPL-MCNC: 1.8 MG/DL (ref 1.6–2.6)
PHOSPHATE SERPL-MCNC: 3.8 MG/DL (ref 2.5–4.9)
POTASSIUM BLD-SCNC: 4.3 MMOL/L (ref 3.5–5.5)
SODIUM BLD-SCNC: 140 MMOL/L (ref 136–145)

## 2019-08-29 PROCEDURE — 83735 ASSAY OF MAGNESIUM: CPT

## 2019-08-29 PROCEDURE — 36415 COLL VENOUS BLD VENIPUNCTURE: CPT

## 2019-08-29 PROCEDURE — 74011250636 HC RX REV CODE- 250/636: Performed by: INTERNAL MEDICINE

## 2019-08-29 PROCEDURE — 96372 THER/PROPH/DIAG INJ SC/IM: CPT

## 2019-08-29 PROCEDURE — 80047 BASIC METABLC PNL IONIZED CA: CPT

## 2019-08-29 PROCEDURE — 84100 ASSAY OF PHOSPHORUS: CPT

## 2019-08-29 RX ADMIN — DENOSUMAB 60 MG: 60 INJECTION SUBCUTANEOUS at 09:19

## 2019-08-29 NOTE — PROGRESS NOTES
[facility] Progress Note    Date: 2019    Name: Jose Branch    MRN: [id number]         : 1950      Ms. Trish Flannery arrived to [facility] at 0900. Ms. Trish Flannery was assessed and education was provided. Ms. Edmund Danielle vitals were reviewed. Visit Vitals  /69 (BP 1 Location: Left arm, BP Patient Position: Sitting)   Pulse 60   Temp 97.3 °F (36.3 °C)   Resp 17   SpO2 97%       Blood drawn for labs via Right antecubital right, condition patent and no redness venipuncture x1 attempt. Recent Results (from the past 12 hour(s))   POC CHEM8    Collection Time: 19  9:13 AM   Result Value Ref Range    CO2, POC 24 19 - 24 MMOL/L    Glucose,  (H) 74 - 106 MG/DL    BUN, POC 12 7 - 18 MG/DL    Creatinine, POC 0.5 (L) 0.6 - 1.3 MG/DL    GFRAA, POC >60 >60 ml/min/1.73m2    GFRNA, POC >60 >60 ml/min/1.73m2    Sodium,  136 - 145 MMOL/L    Potassium, POC 4.3 3.5 - 5.5 MMOL/L    Calcium, ionized (POC) 1.18 1.12 - 1.32 mmol/L    Chloride,  100 - 108 MMOL/L    Anion gap, POC 16 10 - 20      Hematocrit, POC 41 36 - 49 %    Hemoglobin, POC 13.9 12 - 16 G/DL       iCal:  1.18    Prolia 60mg was administered as ordered SQ in patient's Right upper arm. Ms. rTish Flannery tolerated well without complaints. Ms. Phuong Redding was observed for 30 minutes post-injection. No S/S of adverse reaction at this time. Ms. Trish Flannery was discharged from [place] [address] in stable condition at 1000. She is to return on 19 at 0800 for her next Prolia injection appointment.     Jacy Henry RN  2019  1000

## 2019-10-21 RX ORDER — RAMIPRIL 10 MG/1
CAPSULE ORAL
Qty: 90 CAP | Refills: 1 | Status: SHIPPED | OUTPATIENT
Start: 2019-10-21 | End: 2020-03-31

## 2020-02-03 ENCOUNTER — OFFICE VISIT (OUTPATIENT)
Dept: FAMILY MEDICINE CLINIC | Age: 70
End: 2020-02-03

## 2020-02-03 VITALS
OXYGEN SATURATION: 97 % | SYSTOLIC BLOOD PRESSURE: 149 MMHG | BODY MASS INDEX: 29.59 KG/M2 | HEIGHT: 63 IN | TEMPERATURE: 97.9 F | DIASTOLIC BLOOD PRESSURE: 54 MMHG | WEIGHT: 167 LBS | RESPIRATION RATE: 16 BRPM | HEART RATE: 59 BPM

## 2020-02-03 DIAGNOSIS — E11.9 TYPE 2 DIABETES MELLITUS WITHOUT COMPLICATION, WITHOUT LONG-TERM CURRENT USE OF INSULIN (HCC): Primary | ICD-10-CM

## 2020-02-03 DIAGNOSIS — K63.5 POLYP OF COLON, UNSPECIFIED PART OF COLON, UNSPECIFIED TYPE: ICD-10-CM

## 2020-02-03 DIAGNOSIS — E08.65 DIABETES MELLITUS DUE TO UNDERLYING CONDITION WITH HYPERGLYCEMIA, WITHOUT LONG-TERM CURRENT USE OF INSULIN (HCC): ICD-10-CM

## 2020-02-03 DIAGNOSIS — M81.0 AGE-RELATED OSTEOPOROSIS WITHOUT CURRENT PATHOLOGICAL FRACTURE: ICD-10-CM

## 2020-02-03 LAB — HBA1C MFR BLD HPLC: 5.4 %

## 2020-02-03 RX ORDER — INSULIN PUMP SYRINGE, 3 ML
EACH MISCELLANEOUS
Qty: 1 KIT | Refills: 0 | Status: SHIPPED | OUTPATIENT
Start: 2020-02-03 | End: 2020-02-03 | Stop reason: SDUPTHER

## 2020-02-03 RX ORDER — INSULIN PUMP SYRINGE, 3 ML
EACH MISCELLANEOUS
Qty: 1 KIT | Refills: 0 | Status: SHIPPED | OUTPATIENT
Start: 2020-02-03

## 2020-02-03 NOTE — PATIENT INSTRUCTIONS
Osteoporosis: Care Instructions  Your Care Instructions    Osteoporosis causes bones to become thin and weak. It is much more common in women than in men. Osteoporosis may be very advanced before you know you have it. Sometimes the first sign is a broken bone in the hip, spine, or wrist or sudden pain in your middle or lower back. Follow-up care is a key part of your treatment and safety. Be sure to make and go to all appointments, and call your doctor if you are having problems. It's also a good idea to know your test results and keep a list of the medicines you take. How can you care for yourself at home? · Your doctor may prescribe a bisphosphonate, such as risedronate (Actonel) or alendronate (Fosamax), for osteoporosis. If you are taking one of these medicines by mouth:  ? Take your medicine with a full glass of water when you first get up in the morning. ? Do not lie down, eat, drink a beverage, or take any other medicine for at least 30 minutes after taking the drug. This helps prevent stomach problems. ? Do not take your medicine late in the day if you forgot to take it in the morning. Skip it, and take the usual dose the next morning. ? If you have side effects, tell your doctor. He or she may prescribe another medicine. · Get enough calcium and vitamin D. The [address] [address] of [employer] recommends adults younger than age 46 need 1,000 mg of calcium and 600 IU of vitamin D each day. Women ages 46 to 79 need 1,200 mg of calcium and 600 IU of vitamin D each day. Men ages 46 to 79 need 1,000 mg of calcium and 600 IU of vitamin D each day. Adults 71 and older need 1,200 mg of calcium and 800 IU of vitamin D each day. ? Eat foods rich in calcium, like yogurt, cheese, milk, and dark green vegetables. This is a good way to get the calcium you need. You can get vitamin D from eggs, fatty fish, cereal, and milk. ? Talk to your doctor about taking a calcium plus vitamin D supplement. Be careful, though. Adults ages 23 to 48 should not get more than 2,500 mg of calcium and 4,000 IU of vitamin D each day, whether it is from supplements and/or food. Adults ages 46 and older should not get more than 2,000 mg of calcium and 4,000 IU of vitamin D each day from supplements and/or food. · Limit alcohol to 2 drinks a day for men and 1 drink a day for women. Too much alcohol can cause health problems. · Do not smoke. Smoking puts you at a much higher risk for osteoporosis. If you need help quitting, talk to your doctor about stop-smoking programs and medicines. These can increase your chances of quitting for good. · Get regular bone-building exercise. Weight-bearing and resistance exercises keep bones healthy by working the muscles and bones against gravity. Start out at an exercise level that feels right for you. Add a little at a time until you can do the following:  ? Do 30 minutes of weight-bearing exercise on most days of the week. Walking, jogging, stair climbing, and dancing are good choices. ? Do resistance exercises with weights or elastic bands 2 to 3 days a week. · Reduce your risk of falls:  ? Wear supportive shoes with low heels and nonslip soles. ? Use a cane or walker, if you need it. Use shower chairs and bath benches. Put in handrails on stairways, around your shower or tub area, and near the toilet. ? Keep stairs, porches, and walkways well lit. Use night-lights. ? Remove throw rugs and other objects that are in the way. ? Avoid icy, wet, or slippery surfaces. ? Keep a cordless phone and a flashlight with new batteries by your bed. When should you call for help? Watch closely for changes in your health, and be sure to contact your doctor if you have any problems. Where can you learn more? Go to http://nickolas-selvin.info/. Enter K100 in the search box to learn more about \"Osteoporosis: Care Instructions. \"  Current as of: November 7, 2018  Content Version: 12.2  © 3285-0344 Health[employer], [employer]. Care instructions adapted under license by [employer] (which disclaims liability or warranty for this information). If you have questions about a medical condition or this instruction, always ask your healthcare professional. [address] any warranty or liability for your use of this information. Preventing Osteoporosis: Care Instructions  Your Care Instructions    Osteoporosis means the bones are weak and thin enough that they can break easily. The older you are, the more likely you are to get osteoporosis. But with plenty of calcium, vitamin D, and exercise, you can help prevent osteoporosis. The preteen and teen years are a key time for bone building. With the help of calcium, vitamin D, and exercise in those early years and beyond, the bones reach their peak density and strength by age 27. After age 27, your bones naturally start to thin and weaken. The stronger your bones are at around age 27, the lower your risk for osteoporosis. But no matter what your age and risk are, your bones still need calcium, vitamin D, and exercise to stay strong. Also avoid smoking, and limit alcohol. Smoking and heavy alcohol use can make your bones thinner. Talk to your doctor about any special risks you might have, such as having a close relative with osteoporosis or taking a medicine that can weaken bones. Your doctor can tell you the best ways to protect your bones from thinning. Follow-up care is a key part of your treatment and safety. Be sure to make and go to all appointments, and call your doctor if you are having problems. It's also a good idea to know your test results and keep a list of the medicines you take. How can you care for yourself at home? · Get enough calcium and vitamin D. The [place] of Medicine recommends adults younger than age 46 need 1,000 mg of calcium and 600 IU of vitamin D each day.  Women ages 46 to 79 need 1,200 mg of calcium and 600 IU of vitamin D each day. Men ages 46 to 79 need 1,000 mg of calcium and 600 IU of vitamin D each day. Adults 71 and older need 1,200 mg of calcium and 800 IU of vitamin D each day. ? Eat foods rich in calcium, like yogurt, cheese, milk, and dark green vegetables. ? Eat foods rich in vitamin D, like eggs, fatty fish, cereal, and fortified milk. ? Get some sunshine. Your body uses sunshine to make its own vitamin D. The safest time to be out in the sun is before 10 a.m. or after 3 p.m. Avoid getting sunburned. Sunburn can increase your risk of skin cancer. ? Talk to your doctor about taking a calcium plus vitamin D supplement. Ask about what type of calcium is right for you, and how much to take at a time. Adults ages 23 to 48 should not get more than 2,500 mg of calcium and 4,000 IU of vitamin D each day, whether it is from supplements and/or food. Adults ages 46 and older should not get more than 2,000 mg of calcium and 4,000 IU of vitamin D each day from supplements and/or food. · Get regular bone-building exercise. Weight-bearing and resistance exercises keep bones healthy by working the muscles and bones against gravity. Start out at an exercise level that feels right for you. Add a little at a time until you can do the following:  ? Do 30 minutes of weight-bearing exercise on most days of the week. Walking, jogging, stair climbing, and dancing are good choices. ? Do resistance exercises with weights or elastic bands 2 to 3 days a week. · Limit alcohol. Drink no more than 1 alcohol drink a day if you are a woman. Drink no more than 2 alcohol drinks a day if you are a man. · Do not smoke. Smoking can make bones thin faster. If you need help quitting, talk to your doctor about stop-smoking programs and medicines. These can increase your chances of quitting for good. When should you call for help?   Watch closely for changes in your health, and be sure to contact your doctor if you have any problems. Where can you learn more? Go to http://nickolas-selvin.info/. Enter K325 in the search box to learn more about \"Preventing Osteoporosis: Care Instructions. \"  Current as of: November 7, 2018  Content Version: 12.2  © 5345-4968 [employer] [place]. Care instructions adapted under license by [employer] (which disclaims liability or warranty for this information). If you have questions about a medical condition or this instruction, always ask your healthcare professional. [address] any warranty or liability for your use of this information.

## 2020-02-03 NOTE — PROGRESS NOTES
1. Have you been to the ER, urgent care clinic since your last visit? Hospitalized since your last visit? No.     2. Have you seen or consulted any other health care providers outside of the [address] [place] [address] since your last visit? Include any pap smears or colon screening. Yes, saw her Endocrinologist Dr. Celena Blum in 11/2019.      Chief Complaint   Patient presents with    New Patient     Previous PCP was Dr. Esther Champion Diabetes     see's Endocrinology    Osteoporosis    Cholesterol Problem    Hypertension

## 2020-02-03 NOTE — PROGRESS NOTES
Kemar Momin is a 68 y.o. female and presents with New Patient (Previous PCP was Dr. Christiane Cruz); Diabetes; Osteoporosis; Cholesterol Problem; Hypertension; Hyperthyroidism (see's Endocrinilogy Dr. Shalonda Talley.); and Irregular Heart Beat (see's Cardiologist Dr. Julisa Hilliard)       Subjective:      Osteoporosis- improved bmd on last DEXA 12/10/18- lowest tscore previously was -2.8. Diabetes- no complications- last Z3Z 0.1% 7/2019. Eating more sweets recently- doesn't have a glucometer at home. Hyperthyroidism- due to Graves- sees endo for this- Dr Shalonda Talley- stable. Colon polyps- colonoscopy done by Dr. Moises Rothman in 2014. Assessment/Plan:      Osteoporosis- continue prolia-next injection due now- has 2/28/20 appt for this. repeat DEXA 12/2020. Diabetes type 2- controlled- will check poc a1c today. A1c today-5.4%- excellent control. Continue metformin. Colon polyps-multiple-had 6 removed last colonoscopy 2014- pathology however is not in our system. Discussed and importance of follow-up with GI for this reviewed to prevent additional issues. Referral placed today. Hyperthyroidism- continue endo follow up. Diagnoses and all orders for this visit:    1. Type 2 diabetes mellitus without complication, without long-term current use of insulin (HCC)  -     AMB POC HEMOGLOBIN A1C  -     Blood-Glucose Meter monitoring kit; Use to check blood sugar daily    2. Polyp of colon, unspecified part of colon, unspecified type  -     REFERRAL TO GASTROENTEROLOGY    3. Age-related osteoporosis without current pathological fracture    Other orders  -     denosumab (PROLIA) 60 mg/mL injection; 1 mL by SubCUTAneous route once for 1 dose.  -     varicella-zoster recombinant, PF, (SHINGRIX, PF,) 50 mcg/0.5 mL susr injection; 0.5 mL by IntraMUSCular route once for 1 dose.  -     varicella-zoster recombinant, PF, (SHINGRIX, PF,) 50 mcg/0.5 mL susr injection; 0.5 mL by IntraMUSCular route once for 1 dose.  To be done 2-6 months after initial vaccination. RTC in 6 months with labs. Orders Placed This Encounter    REFERRAL TO GASTROENTEROLOGY     Referral Priority:   Routine     Referral Type:   Consultation     Referral Reason:   Specialty Services Required     Referred to Provider:   Reynaldo Zamudio MD     Number of Visits Requested:   1    AMB POC HEMOGLOBIN A1C    DISCONTD: denosumab (PROLIA) 60 mg/mL injection     Si mg by SubCUTAneous route.  denosumab (PROLIA) 60 mg/mL injection     Si mL by SubCUTAneous route once for 1 dose. Dispense:  1 mL     Refill:  0    DISCONTD: Blood-Glucose Meter monitoring kit     Sig: Use to check blood sugar daily     Dispense:  1 Kit     Refill:  0    varicella-zoster recombinant, PF, (SHINGRIX, PF,) 50 mcg/0.5 mL susr injection     Si.5 mL by IntraMUSCular route once for 1 dose. Dispense:  0.5 mL     Refill:  0    varicella-zoster recombinant, PF, (SHINGRIX, PF,) 50 mcg/0.5 mL susr injection     Si.5 mL by IntraMUSCular route once for 1 dose. To be done 2-6 months after initial vaccination. Dispense:  0.5 mL     Refill:  0    Blood-Glucose Meter monitoring kit     Sig: Use to check blood sugar daily     Dispense:  1 Kit     Refill:  0               ROS:  Negative except as mentioned above  Cardiac-  Pulmonary-  GI-    SH:  Social History     Tobacco Use    Smoking status: Former Smoker     Packs/day: 0.50     Years: 15.00     Pack years: 7.50     Types: Cigarettes     Last attempt to quit:      Years since quittin.0    Smokeless tobacco: Never Used    Tobacco comment: electronic cigarettes   Substance Use Topics    Alcohol use:  Yes     Alcohol/week: 3.0 standard drinks     Types: 1 Glasses of wine, 1 Cans of beer, 1 Shots of liquor per week     Comment: occasionally    Drug use: No         Medications/Allergies:  Current Outpatient Medications on File Prior to Visit   Medication Sig Dispense Refill    ramipril (ALTACE) 10 mg capsule TAKE 1 CAPSULE DAILY 90 Cap 1    atorvastatin (LIPITOR) 40 mg tablet Take 1 Tab by mouth daily. 90 Tab 2    metFORMIN (GLUCOPHAGE) 1,000 mg tablet Take 1 Tab by mouth two (2) times daily (with meals). 180 Tab 3    propranolol (INDERAL) 10 mg tablet TAKE 2 TABLETS TWICE A  Tab 3    ELIQUIS 5 mg tablet TAKE 1 TABLET TWICE A  Tab 3    cholecalciferol, VITAMIN D3, (VITAMIN D3) 5,000 unit tab tablet Take 5,000 Units by mouth daily.  methimazole (TAPAZOLE) 10 mg tablet Take 5 mg by mouth every Monday, Wednesday, Friday.  triamcinolone (NASACORT) 55 mcg nasal inhaler 1 Geraldine by Both Nostrils route daily as needed.  omega 3-dha-epa-fish oil 100-160-1,000 mg cap Take 1,000 mg by mouth two (2) times a day.  multivitamin (ONE A DAY) tablet Take 1 Tab by mouth daily.  denosumab (PROLIA) 60 mg/mL injection 60 mg by SubCUTAneous route.  montelukast (SINGULAIR) 10 mg tablet TAKE 1 TABLET DAILY 90 Tab 3    cetirizine (ZYRTEC) 10 mg tablet Take  by mouth daily.  acetaminophen (TYLENOL) 325 mg tablet Take  by mouth every four (4) hours as needed for Pain. No current facility-administered medications on file prior to visit. Allergies   Allergen Reactions    Sulfa (Sulfonamide Antibiotics) Other (comments)     Renal failure        Objective:  Visit Vitals  /57   Pulse 61   Temp 97.9 °F (36.6 °C) (Oral)   Resp 16   Ht 5' 3\" (1.6 m)   Wt 167 lb (75.8 kg)   SpO2 97%   BMI 29.58 kg/m²    Body mass index is 29.58 kg/m². Constitutional: Well developed, nourished, no distress, alert   HENT: Exterior ears and tympanic membranes normal bilaterally. Supple neck. No thyromegaly or lymphadenopathy. Oropharynx clear and moist mucous membranes. Eyes: Conjunctiva normal. PERRL. CV: S1, S2.  RRR. No murmurs/rubs. No thrills palpated. No carotid bruits. Intact distal pulses. No edema. Pulm: No abnormalities on inspection. Clear to auscultation bilaterally.  No wheezing/rhonchi. Normal effort. GI: Soft, nontender, nondistended. Normal active bowel sounds. No  masses on palpation. No hepatosplenomegaly.

## 2020-02-28 ENCOUNTER — APPOINTMENT (OUTPATIENT)
Dept: INFUSION THERAPY | Age: 70
End: 2020-02-28

## 2020-03-06 ENCOUNTER — HOSPITAL ENCOUNTER (OUTPATIENT)
Dept: INFUSION THERAPY | Age: 70
Discharge: HOME | End: 2020-03-06
Payer: MEDICARE

## 2020-03-06 LAB
ALBUMIN SERPL-MCNC: 4 G/DL (ref 3.4–5)
ALBUMIN/GLOB SERPL: 1.1 {RATIO} (ref 0.8–1.7)
ALP SERPL-CCNC: 71 U/L (ref 45–117)
ALT SERPL-CCNC: 27 U/L (ref 13–56)
ANION GAP SERPL CALC-SCNC: 6 MMOL/L (ref 3–18)
AST SERPL-CCNC: 18 U/L (ref 10–38)
BILIRUB SERPL-MCNC: 0.9 MG/DL (ref 0.2–1)
BUN SERPL-MCNC: 11 MG/DL (ref 7–18)
BUN/CREAT SERPL: 16 (ref 12–20)
CALCIUM SERPL-MCNC: 9.8 MG/DL (ref 8.5–10.1)
CHLORIDE SERPL-SCNC: 108 MMOL/L (ref 100–111)
CO2 SERPL-SCNC: 28 MMOL/L (ref 21–32)
CREAT SERPL-MCNC: 0.69 MG/DL (ref 0.6–1.3)
GLOBULIN SER CALC-MCNC: 3.6 G/DL (ref 2–4)
GLUCOSE SERPL-MCNC: 187 MG/DL (ref 74–99)
MAGNESIUM SERPL-MCNC: 2.1 MG/DL (ref 1.6–2.6)
PHOSPHATE SERPL-MCNC: 4.1 MG/DL (ref 2.5–4.9)
POTASSIUM SERPL-SCNC: 3.8 MMOL/L (ref 3.5–5.5)
PROT SERPL-MCNC: 7.6 G/DL (ref 6.4–8.2)
SODIUM SERPL-SCNC: 142 MMOL/L (ref 136–145)

## 2020-03-06 PROCEDURE — 80053 COMPREHEN METABOLIC PANEL: CPT

## 2020-03-06 PROCEDURE — 96372 THER/PROPH/DIAG INJ SC/IM: CPT

## 2020-03-06 PROCEDURE — 84100 ASSAY OF PHOSPHORUS: CPT

## 2020-03-06 PROCEDURE — 83735 ASSAY OF MAGNESIUM: CPT

## 2020-03-06 PROCEDURE — 36415 COLL VENOUS BLD VENIPUNCTURE: CPT

## 2020-03-06 PROCEDURE — 74011250636 HC RX REV CODE- 250/636: Performed by: INTERNAL MEDICINE

## 2020-03-06 RX ADMIN — DENOSUMAB 60 MG: 60 INJECTION SUBCUTANEOUS at 11:34

## 2020-03-06 NOTE — PROGRESS NOTES
[facility] Progress Note    Date: 2020    Name: Albino Centeno    MRN: [id number]         : 1950      Ms. Nati Galindo arrived to [facility] at [phone]. Ms. Nati Galindo was assessed and education was provided. Ms. Wing Daniel vitals were reviewed. Visit Vitals  /62 (BP 1 Location: Right arm, BP Patient Position: Sitting)   Pulse 61   Temp 97.5 °F (36.4 °C)   Resp 18   SpO2 98%       Blood drawn for labs via Right antecubital right, condition patent and no redness venipuncture x1 attempt. Recent Results (from the past 12 hour(s))   METABOLIC PANEL, COMPREHENSIVE    Collection Time: 20 10:20 AM   Result Value Ref Range    Sodium 142 136 - 145 mmol/L    Potassium 3.8 3.5 - 5.5 mmol/L    Chloride 108 100 - 111 mmol/L    CO2 28 21 - 32 mmol/L    Anion gap 6 3.0 - 18 mmol/L    Glucose 187 (H) 74 - 99 mg/dL    BUN 11 7.0 - 18 MG/DL    Creatinine 0.69 0.6 - 1.3 MG/DL    BUN/Creatinine ratio 16 12 - 20      GFR est AA >60 >60 ml/min/1.73m2    GFR est non-AA >60 >60 ml/min/1.73m2    Calcium 9.8 8.5 - 10.1 MG/DL    Bilirubin, total 0.9 0.2 - 1.0 MG/DL    ALT (SGPT) 27 13 - 56 U/L    AST (SGOT) 18 10 - 38 U/L    Alk. phosphatase 71 45 - 117 U/L    Protein, total 7.6 6.4 - 8.2 g/dL    Albumin 4.0 3.4 - 5.0 g/dL    Globulin 3.6 2.0 - 4.0 g/dL    A-G Ratio 1.1 0.8 - 1.7     MAGNESIUM    Collection Time: 20 10:20 AM   Result Value Ref Range    Magnesium 2.1 1.6 - 2.6 mg/dL   PHOSPHORUS    Collection Time: 20 10:20 AM   Result Value Ref Range    Phosphorus 4.1 2.5 - 4.9 MG/DL       Prolia 60mg was administered as ordered SQ in patient's Right upper arm. Ms. Nati Galindo tolerated well without complaints. No S/S of adverse reaction at this time. Ms. Nati Galindo was discharged from [place] [address] in stable condition at 1140. She is to return on 20 at 1000 for her next Prolia injection appointment.     Jeff Guzmán RN  2020

## 2020-03-16 ENCOUNTER — DOCUMENTATION ONLY (OUTPATIENT)
Dept: CARDIOLOGY CLINIC | Age: 70
End: 2020-03-16

## 2020-03-16 NOTE — PROGRESS NOTES
Verbal order and read back per Chloé Lemons MD  Recived clearance letter for pt from Dr Arianna Kelly. Pt has not been seen in office since 2018. Unable to do clearance.

## 2020-03-31 VITALS
TEMPERATURE: 97.5 F | DIASTOLIC BLOOD PRESSURE: 62 MMHG | RESPIRATION RATE: 18 BRPM | HEART RATE: 61 BPM | OXYGEN SATURATION: 98 % | SYSTOLIC BLOOD PRESSURE: 120 MMHG

## 2020-03-31 RX ORDER — RAMIPRIL 10 MG/1
CAPSULE ORAL
Qty: 90 CAP | Refills: 1 | Status: SHIPPED | OUTPATIENT
Start: 2020-03-31 | End: 2020-10-01

## 2020-06-01 ENCOUNTER — OFFICE VISIT (OUTPATIENT)
Dept: CARDIOLOGY CLINIC | Age: 70
End: 2020-06-01

## 2020-06-01 VITALS
HEIGHT: 63 IN | DIASTOLIC BLOOD PRESSURE: 59 MMHG | BODY MASS INDEX: 29.23 KG/M2 | SYSTOLIC BLOOD PRESSURE: 147 MMHG | TEMPERATURE: 97.7 F | OXYGEN SATURATION: 98 % | WEIGHT: 165 LBS | HEART RATE: 57 BPM

## 2020-06-01 DIAGNOSIS — I10 ESSENTIAL HYPERTENSION WITH GOAL BLOOD PRESSURE LESS THAN 140/90: ICD-10-CM

## 2020-06-01 DIAGNOSIS — E78.00 PURE HYPERCHOLESTEROLEMIA: ICD-10-CM

## 2020-06-01 DIAGNOSIS — I48.0 PAF (PAROXYSMAL ATRIAL FIBRILLATION) (HCC): Primary | ICD-10-CM

## 2020-06-01 NOTE — PROGRESS NOTES
Jacinda Kapoor presents today for   Chief Complaint   Patient presents with   [address] preferred language for health care discussion is english/other. Is someone accompanying this pt? No     Is the patient using any DME equipment during OV? No     Depression Screening:  3 most recent PHQ Screens 6/1/2020   Little interest or pleasure in doing things Not at all   Feeling down, depressed, irritable, or hopeless Not at all   Total Score PHQ 2 0       Learning Assessment:  Learning Assessment 3/2/2016   PRIMARY LEARNER Patient   HIGHEST LEVEL OF EDUCATION - PRIMARY LEARNER  -   BARRIERS PRIMARY LEARNER -   [address] CAREGIVER -   PRIMARY LANGUAGE ENGLISH   LEARNER PREFERENCE PRIMARY DEMONSTRATION     -     -   ANSWERED BY patient   RELATIONSHIP SELF       Abuse Screening:  Abuse Screening Questionnaire 3/2/2016   Do you ever feel afraid of your partner? N   Are you in a relationship with someone who physically or mentally threatens you? N   Is it safe for you to go home? Y       Fall Risk  Fall Risk Assessment, last 12 mths 6/1/2020   Able to walk? Yes   Fall in past 12 months? No       Pt currently taking Anticoagulant therapy? No     Coordination of Care:  1. Have you been to the ER, urgent care clinic since your last visit? Hospitalized since your last visit? No     2. Have you seen or consulted any other health care providers outside of the [address] [place] [address] since your last visit? Include any pap smears or colon screening.   yes

## 2020-06-01 NOTE — PROGRESS NOTES
[facility]    HPI:   Ms. Margy Marshall is a 68 y.o. female with history of diabetes and hypertension. She has atrial fibrillation that was diagnosed in the clinical setting of a hyperthyroid state in 2014. Paroxsymal in nature. Ms. Margy Marshall is here today for follow-up appointment. I have not seen her in for almost 18 months. She denies any cardiac symptoms. She denies any symptoms related to atrial fibrillation. She denies any bleeding problem. She denies any chest pain or chest tightness concerning for angina. She has some back pain. She denies PND or lower extremity swelling  She is supposed to undergo routine screening colonoscopy    Past Medical History:   Diagnosis Date    Atrial fibrillation     CHADS score 2  (-CHF, +HTN, -AGE, +DM, -CVA)    Bilateral cataracts     Colon polyps     7/14  repeat 5 yrs - Dr. Neeraj Rosario    Diabetes     Dyslipidemia     Electronic cigarette use     Graves disease     5/14 Grave's disease, Dr. Uriah Veras History of seasonal allergies     Hypertension     Hypertension     Multiple thyroid nodules     5/14    Osteoporosis     last dexa 12/14    Plantar fasciitis, bilateral     Dr. Gamal Bender Sleep apnea     mild sleep apnea, no CPAP    Vitamin D deficiency        Past Surgical History:   Procedure Laterality Date    HX CATARACT REMOVAL      bilateral    HX COLONOSCOPY      7/14  Dr. Neeraj Rosario - repeat 7/19    HX LAP CHOLECYSTECTOMY      2000    HX ORTHOPAEDIC      1965  left knee    HX ORTHOPAEDIC Right     right shoulder surgery x2    HX SEPTOPLASTY      HX WISDOM TEETH EXTRACTION         Current Outpatient Medications   Medication Sig    ramipriL (ALTACE) 10 mg capsule TAKE 1 CAPSULE DAILY    apixaban (ELIQUIS) 5 mg tablet TAKE 1 TABLET TWICE A DAY    Blood-Glucose Meter monitoring kit Use to check blood sugar daily    atorvastatin (LIPITOR) 40 mg tablet Take 1 Tab by mouth daily.     metFORMIN (GLUCOPHAGE) 1,000 mg tablet Take 1 Tab by mouth two (2) times daily (with meals).  propranolol (INDERAL) 10 mg tablet TAKE 2 TABLETS TWICE A DAY    cholecalciferol, VITAMIN D3, (VITAMIN D3) 5,000 unit tab tablet Take 5,000 Units by mouth daily.  methimazole (TAPAZOLE) 10 mg tablet Take 5 mg by mouth every Monday, Wednesday, Friday.  omega 3-dha-epa-fish oil 100-160-1,000 mg cap Take 1,000 mg by mouth two (2) times a day.  multivitamin (ONE A DAY) tablet Take 1 Tab by mouth daily.  acetaminophen (TYLENOL) 325 mg tablet Take  by mouth every four (4) hours as needed for Pain. No current facility-administered medications for this visit. Allergies   Allergen Reactions    Sulfa (Sulfonamide Antibiotics) Other (comments)     Renal failure        Family History:    Heart Disease Father        Social History:   She  reports that she quit smoking about 6 years ago. Her smoking use included cigarettes. She has a 7.50 pack-year smoking history. She has never used smokeless tobacco.  She  reports current alcohol use of about 3.0 standard drinks of alcohol per week. Physical:   Vitals:   BP: 147/59  HR: (!) 57  Wt: 165 lb (74.8 kg)    Exam:   General: Older woman, no complaints, no distress.     Head/Neck: No JVD    No bruits. Lungs:  No respiratory distress. Clear with good effort. Heart:  Regular.  No murmur. No rubs or gallops. Abdomen: Soft. Bowel sounds present    No edema.     Neurological: Alert and oriented to person, place, time. No gross neurological deficit. Skin:  Warm and dry.     Review of Data:   LABS:   Lab Results   Component Value Date/Time    WBC 6.2 07/23/2019 10:13 AM    HGB 13.0 07/23/2019 10:13 AM    HCT 38.9 07/23/2019 10:13 AM    PLATELET 820 95/46/4327 10:13 AM     Lab Results   Component Value Date/Time    Sodium 142 03/06/2020 10:20 AM    Potassium 3.8 03/06/2020 10:20 AM    Chloride 108 03/06/2020 10:20 AM    CO2 28 03/06/2020 10:20 AM    Anion gap 6 03/06/2020 10:20 AM    Glucose 187 (H) 03/06/2020 10:20 AM    BUN 11 03/06/2020 10:20 AM    Creatinine 0.69 03/06/2020 10:20 AM    BUN/Creatinine ratio 16 03/06/2020 10:20 AM    GFR est AA >60 03/06/2020 10:20 AM    GFR est non-AA >60 03/06/2020 10:20 AM    Calcium 9.8 03/06/2020 10:20 AM    Bilirubin, total 0.9 03/06/2020 10:20 AM    Alk. phosphatase 71 03/06/2020 10:20 AM    Protein, total 7.6 03/06/2020 10:20 AM    Albumin 4.0 03/06/2020 10:20 AM    Globulin 3.6 03/06/2020 10:20 AM    A-G Ratio 1.1 03/06/2020 10:20 AM    ALT (SGPT) 27 03/06/2020 10:20 AM     Lab Results   Component Value Date/Time    Cholesterol, total 120 07/23/2019 10:13 AM    HDL Cholesterol 54 07/23/2019 10:13 AM    LDL, calculated 45.8 07/23/2019 10:13 AM    Triglyceride 101 07/23/2019 10:13 AM    CHOL/HDL Ratio 2.2 07/23/2019 10:13 AM     Lab Results   Component Value Date/Time    Hemoglobin A1c 5.9 (H) 07/23/2019 10:13 AM    Hemoglobin A1c (POC) 5.4 02/03/2020 03:22 PM     EKG:   sinus rhythm, 62 beats per minute, nonspecific ST-T wave changes. ECHO: July 2014:  LEFT VENTRICLE: Size was normal. Systolic function was normal by visual assessment. Ejection fraction was estimated in the range of 55-60%. No obvious wall motion abnormalities identified in the views obtained. Wall thickness was at the upper limits of normal. DOPPLER: Doppler parameters were consistent with abnormal left ventricular relaxation (grade 1 diastolic dysfunction). RIGHT VENTRICLE: The size was normal. Systolic function was normal. DOPPLER: Estimated peak pressure was in the range of 40-45mmHg. LEFT ATRIUM: Size was at the upper limits of normal. LA volume index was 25 ml/m squared. RIGHT ATRIUM: Size was normal.  MITRAL VALVE: Normal valve structure. DOPPLER: There was no evidence for stenosis. There was mild regurgitation. AORTIC VALVE: Not well visualized. DOPPLER: There was no stenosis. There was mild regurgitation. TRICUSPID VALVE: Normal valve structure.  DOPPLER: There was no evidence for tricuspid stenosis. There was mild to moderate regurgitation. PULMONIC VALVE: Not well visualized, but normal Doppler findings. DOPPLER: There was no stenosis. There was no significant regurgitation. AORTA: The abdominal aorta measured 2.2 cm. The root exhibited normal size. PERICARDIUM: There was no pericardial effusion. The pericardium was normal in appearance. EKG:  Normal sinus rhythm. Non-specific ST-T wave changes. Impression / Plan:      Atrial fibrillation:    Ms. Magdi Ashford was diagnosed with atrial fibrillation in the setting of hyperthyroidism. Denies palpitations or known afib episode sInce her last visit. She is in sinus by exam today she is taking Eliquis without issue. She is also on Propranolol. She would like to find alternatives of Eliquis as Eliquis is financially expensive. We will try Xarelto 20 mg daily. She understands that she cannot take Eliquis if she cannot afford to take Xarelto. Diabetes:  Goal hemoglobin A1c less than 7 is recommended from a cardiovascular standpoint. Last hemoglobin A1c 5.4. Hyperlipidemia:  Currently she is on Atorvastatin 40 mg daily. Last lipid profile was November with LDL 45    Hypertension:  Blood pressure today is 147/59 mmHg. Currently she is on Ramipril and Inderal.  I would recommend to continue both. Pre:op:  Patient is scheduled for screening colonoscopy and possible biopsy  From cardiac standpoint, this would be low risk procedure. She has no symptoms to suggest unstable angina or decompensated heart failure. There is no evidence of fluid overload on exam.  She appears to be in sinus rhythm. It is okay to stop Eliquis 2 days prior to planned colonoscopy. I have advised her to take aspirin while she is off of Eliquis if it is okay. Other than the above, or unless any additional issues arise, I have asked that she follow up in nine months.

## 2020-07-02 RX ORDER — PROPRANOLOL HYDROCHLORIDE 10 MG/1
10 TABLET ORAL 2 TIMES DAILY
Qty: 360 TAB | Refills: 3 | Status: SHIPPED | OUTPATIENT
Start: 2020-07-02 | End: 2020-08-20 | Stop reason: SDUPTHER

## 2020-07-02 RX ORDER — ATORVASTATIN CALCIUM 40 MG/1
40 TABLET, FILM COATED ORAL DAILY
Qty: 90 TAB | Refills: 2 | Status: SHIPPED | OUTPATIENT
Start: 2020-07-02 | End: 2022-03-29 | Stop reason: SDUPTHER

## 2020-08-20 RX ORDER — PROPRANOLOL HYDROCHLORIDE 10 MG/1
10 TABLET ORAL 2 TIMES DAILY
Qty: 360 TAB | Refills: 3 | Status: SHIPPED | OUTPATIENT
Start: 2020-08-20 | End: 2020-09-24 | Stop reason: ALTCHOICE

## 2020-09-01 RX ORDER — METFORMIN HYDROCHLORIDE 1000 MG/1
1000 TABLET ORAL 2 TIMES DAILY WITH MEALS
Qty: 180 TAB | Refills: 3 | Status: SHIPPED | OUTPATIENT
Start: 2020-09-01

## 2020-09-02 ENCOUNTER — HOSPITAL ENCOUNTER (OUTPATIENT)
Dept: INFUSION THERAPY | Age: 70
Discharge: HOME | End: 2020-09-02
Payer: MEDICARE

## 2020-09-02 VITALS
TEMPERATURE: 98 F | DIASTOLIC BLOOD PRESSURE: 58 MMHG | HEART RATE: 61 BPM | OXYGEN SATURATION: 97 % | SYSTOLIC BLOOD PRESSURE: 112 MMHG

## 2020-09-02 LAB
ANION GAP SERPL CALC-SCNC: 2 MMOL/L (ref 3–18)
BUN SERPL-MCNC: 14 MG/DL (ref 7–18)
BUN/CREAT SERPL: 22 (ref 12–20)
CALCIUM SERPL-MCNC: 9 MG/DL (ref 8.5–10.1)
CHLORIDE SERPL-SCNC: 109 MMOL/L (ref 100–111)
CO2 SERPL-SCNC: 29 MMOL/L (ref 21–32)
CREAT SERPL-MCNC: 0.63 MG/DL (ref 0.6–1.3)
GLUCOSE SERPL-MCNC: 95 MG/DL (ref 74–99)
MAGNESIUM SERPL-MCNC: 1.9 MG/DL (ref 1.6–2.6)
PHOSPHATE SERPL-MCNC: 3.9 MG/DL (ref 2.5–4.9)
POTASSIUM SERPL-SCNC: 3.9 MMOL/L (ref 3.5–5.5)
SODIUM SERPL-SCNC: 140 MMOL/L (ref 136–145)

## 2020-09-02 PROCEDURE — 83735 ASSAY OF MAGNESIUM: CPT

## 2020-09-02 PROCEDURE — 36415 COLL VENOUS BLD VENIPUNCTURE: CPT

## 2020-09-02 PROCEDURE — 80048 BASIC METABOLIC PNL TOTAL CA: CPT

## 2020-09-02 PROCEDURE — 84100 ASSAY OF PHOSPHORUS: CPT

## 2020-09-02 NOTE — PROGRESS NOTES
[facility] Progress Note    Date: 2020    Name: Curtis Hameed    MRN: [id number]         : 1950    Peripheral Lab Draw      Ms. Alonso to [facility], ambulatory at 1000 accompanied by self. Pt was assessed and education was provided. Ms. Lord Merino vitals were reviewed and patient was observed for 5 minutes prior to treatment. Visit Vitals  /58 (BP 1 Location: Right arm, BP Patient Position: Sitting)   Pulse 61   Temp 98 °F (36.7 °C)   SpO2 97%       Blood obtained peripherally from left arm x 1 attempt with butterfly needle and sent to lab for Bmp, Magnesium and Phosphorus per written orders. No bleeding or hematoma noted at site. Gauze and coban applied. Ms. Elbert Araujo tolerated the phlebotomy, and had no complaints. Patient armband removed and shredded. Ms. Elbert Araujo was discharged from [place] [address] in stable condition at 1010.      Trish Chilel Phlebotomist PCT  2020  10:17 AM

## 2020-09-04 ENCOUNTER — HOSPITAL ENCOUNTER (OUTPATIENT)
Dept: INFUSION THERAPY | Age: 70
Discharge: HOME | End: 2020-09-04
Payer: MEDICARE

## 2020-09-04 VITALS
OXYGEN SATURATION: 97 % | HEART RATE: 58 BPM | TEMPERATURE: 98.6 F | DIASTOLIC BLOOD PRESSURE: 64 MMHG | SYSTOLIC BLOOD PRESSURE: 125 MMHG

## 2020-09-04 PROCEDURE — 74011250636 HC RX REV CODE- 250/636: Performed by: NURSE PRACTITIONER

## 2020-09-04 PROCEDURE — 96372 THER/PROPH/DIAG INJ SC/IM: CPT

## 2020-09-04 RX ADMIN — DENOSUMAB 60 MG: 60 INJECTION SUBCUTANEOUS at 11:03

## 2020-09-04 NOTE — PROGRESS NOTES
[facility] Progress Note    Date: 2020    Name: Clover Hobbs    MRN: [id number]         : 1950     PROLIA    Ms. Hair Lange arrived to [facility] at 820 1653. Ms. Hair Lange was assessed and education was provided. Ms. Mandy Painter vitals were reviewed. Visit Vitals  /64 (BP 1 Location: Right arm, BP Patient Position: Sitting)   Pulse (!) 58   Temp 98.6 °F (37 °C)   SpO2 97%     Calcium 9.0    Prolia 60 mg was administered by Jovani Silva RN as ordered SQ in patient's Left upper arm. Band aid applied to site. Ms. Hair Lange tolerated well without complaints. Ms. Hair Lange was discharged from [place] [address] in stable condition at 1110. She is to return on 3/5/21 at 1000 for her next appointment.     Tasha Bailey RN  2020

## 2020-09-24 ENCOUNTER — OFFICE VISIT (OUTPATIENT)
Dept: CARDIOLOGY CLINIC | Age: 70
End: 2020-09-24
Payer: MEDICARE

## 2020-09-24 VITALS
TEMPERATURE: 97.1 F | HEIGHT: 63 IN | BODY MASS INDEX: 28.7 KG/M2 | HEART RATE: 58 BPM | SYSTOLIC BLOOD PRESSURE: 164 MMHG | OXYGEN SATURATION: 98 % | WEIGHT: 162 LBS | DIASTOLIC BLOOD PRESSURE: 88 MMHG

## 2020-09-24 DIAGNOSIS — R07.9 CHEST PAIN, UNSPECIFIED TYPE: Primary | ICD-10-CM

## 2020-09-24 DIAGNOSIS — R07.9 CHEST PAIN, UNSPECIFIED TYPE: ICD-10-CM

## 2020-09-24 DIAGNOSIS — I48.0 PAROXYSMAL ATRIAL FIBRILLATION (HCC): ICD-10-CM

## 2020-09-24 PROCEDURE — G8419 CALC BMI OUT NRM PARAM NOF/U: HCPCS | Performed by: INTERNAL MEDICINE

## 2020-09-24 PROCEDURE — 3017F COLORECTAL CA SCREEN DOC REV: CPT | Performed by: INTERNAL MEDICINE

## 2020-09-24 PROCEDURE — G8510 SCR DEP NEG, NO PLAN REQD: HCPCS | Performed by: INTERNAL MEDICINE

## 2020-09-24 PROCEDURE — 1101F PT FALLS ASSESS-DOCD LE1/YR: CPT | Performed by: INTERNAL MEDICINE

## 2020-09-24 PROCEDURE — 1090F PRES/ABSN URINE INCON ASSESS: CPT | Performed by: INTERNAL MEDICINE

## 2020-09-24 PROCEDURE — G8536 NO DOC ELDER MAL SCRN: HCPCS | Performed by: INTERNAL MEDICINE

## 2020-09-24 PROCEDURE — G8753 SYS BP > OR = 140: HCPCS | Performed by: INTERNAL MEDICINE

## 2020-09-24 PROCEDURE — 99214 OFFICE O/P EST MOD 30 MIN: CPT | Performed by: INTERNAL MEDICINE

## 2020-09-24 PROCEDURE — G8428 CUR MEDS NOT DOCUMENT: HCPCS | Performed by: INTERNAL MEDICINE

## 2020-09-24 PROCEDURE — G8754 DIAS BP LESS 90: HCPCS | Performed by: INTERNAL MEDICINE

## 2020-09-24 RX ORDER — CARVEDILOL 12.5 MG/1
12.5 TABLET ORAL 2 TIMES DAILY WITH MEALS
Qty: 180 TAB | Refills: 3 | Status: SHIPPED | OUTPATIENT
Start: 2020-09-24 | End: 2021-08-10

## 2020-09-24 NOTE — PROGRESS NOTES
[facility]    HPI:   Ms. Stephanie Vargas is a 68 y.o. female with history of diabetes and hypertension. She has atrial fibrillation that was diagnosed in the clinical setting of a hyperthyroid state in 2014. Paroxsymal in nature. Patient is here today for follow-up appointment. She tells me that because of financial reason, her apixaban was changed to Xarelto. She is tolerating that medication well. She tells me that occasionally she would have a left-sided chest discomfort. This is more like a dull aching sensation. Usually this is at rest.  There is no radiation. She denies any nausea or vomiting. This happens for about 5 to 10 minutes and usually resolves itself. She did not seek any medical attention for this before. She is supposed to undergo routine screening colonoscopy    Past Medical History:   Diagnosis Date    Atrial fibrillation     CHADS score 2  (-CHF, +HTN, -AGE, +DM, -CVA)    Bilateral cataracts     Colon polyps     7/14  repeat 5 yrs - Dr. Darrick Denis    Diabetes     Dyslipidemia     Electronic cigarette use     Graves disease     5/14 Grave's disease, Dr. Sai Ospina History of seasonal allergies     Hypertension     Hypertension     Multiple thyroid nodules     5/14    Osteoporosis     last dexa 12/14    Plantar fasciitis, bilateral     Dr. Samir De Oliveira Sleep apnea     mild sleep apnea, no CPAP    Vitamin D deficiency        Past Surgical History:   Procedure Laterality Date    HX CATARACT REMOVAL      bilateral    HX COLONOSCOPY      7/14  Dr. Darrick Denis - repeat 7/19    HX LAP CHOLECYSTECTOMY      2000  [address]  left knee    HX ORTHOPAEDIC Right     right shoulder surgery x2    HX SEPTOPLASTY      HX WISDOM TEETH EXTRACTION         Current Outpatient Medications   Medication Sig    metFORMIN (GLUCOPHAGE) 1,000 mg tablet Take 1 Tab by mouth two (2) times daily (with meals).     propranoloL (INDERAL) 10 mg tablet Take 1 Tab by mouth two (2) times a day.  atorvastatin (LIPITOR) 40 mg tablet Take 1 Tab by mouth daily.  rivaroxaban (XARELTO) 20 mg tab tablet Take 1 Tab by mouth daily.  ramipriL (ALTACE) 10 mg capsule TAKE 1 CAPSULE DAILY    Blood-Glucose Meter monitoring kit Use to check blood sugar daily    cholecalciferol, VITAMIN D3, (VITAMIN D3) 5,000 unit tab tablet Take 5,000 Units by mouth daily.  methimazole (TAPAZOLE) 10 mg tablet Take 5 mg by mouth every Monday, Wednesday, Friday.  omega 3-dha-epa-fish oil 100-160-1,000 mg cap Take 1,000 mg by mouth two (2) times a day.  multivitamin (ONE A DAY) tablet Take 1 Tab by mouth daily.  acetaminophen (TYLENOL) 325 mg tablet Take  by mouth every four (4) hours as needed for Pain. No current facility-administered medications for this visit. Allergies   Allergen Reactions    Sulfa (Sulfonamide Antibiotics) Other (comments)     Renal failure        Family History:    Heart Disease Father        Social History:   She  reports that she quit smoking about 6 years ago. Her smoking use included cigarettes. She has a 7.50 pack-year smoking history. She has never used smokeless tobacco.  She  reports current alcohol use of about 3.0 standard drinks of alcohol per week. Physical:   Vitals:   BP: (!) 164/88  HR: (!) 58  Wt: 162 lb (73.5 kg)    Exam:   General: Older woman, no complaints, no distress.     Head/Neck: No JVD    No bruits. Lungs:  No respiratory distress. Clear with good effort. Heart:  Regular.  No murmur. No rubs or gallops. Abdomen: Soft. Bowel sounds present    No edema.     Neurological: Alert and oriented to person, place, time. No gross neurological deficit. Skin:  Warm and dry.     Review of Data:   LABS:   Lab Results   Component Value Date/Time    WBC 6.2 07/23/2019 10:13 AM    HGB 13.0 07/23/2019 10:13 AM    HCT 38.9 07/23/2019 10:13 AM    PLATELET 916 39/88/2063 10:13 AM     Lab Results   Component Value Date/Time    Sodium 140 09/02/2020 10:10 AM    Potassium 3.9 09/02/2020 10:10 AM    Chloride 109 09/02/2020 10:10 AM    CO2 29 09/02/2020 10:10 AM    Anion gap 2 (L) 09/02/2020 10:10 AM    Glucose 95 09/02/2020 10:10 AM    BUN 14 09/02/2020 10:10 AM    Creatinine 0.63 09/02/2020 10:10 AM    BUN/Creatinine ratio 22 (H) 09/02/2020 10:10 AM    GFR est AA >60 09/02/2020 10:10 AM    GFR est non-AA >60 09/02/2020 10:10 AM    Calcium 9.0 09/02/2020 10:10 AM    Bilirubin, total 0.9 03/06/2020 10:20 AM    Alk. phosphatase 71 03/06/2020 10:20 AM    Protein, total 7.6 03/06/2020 10:20 AM    Albumin 4.0 03/06/2020 10:20 AM    Globulin 3.6 03/06/2020 10:20 AM    A-G Ratio 1.1 03/06/2020 10:20 AM    ALT (SGPT) 27 03/06/2020 10:20 AM     Lab Results   Component Value Date/Time    Cholesterol, total 120 07/23/2019 10:13 AM    HDL Cholesterol 54 07/23/2019 10:13 AM    LDL, calculated 45.8 07/23/2019 10:13 AM    Triglyceride 101 07/23/2019 10:13 AM    CHOL/HDL Ratio 2.2 07/23/2019 10:13 AM     Lab Results   Component Value Date/Time    Hemoglobin A1c 5.9 (H) 07/23/2019 10:13 AM    Hemoglobin A1c (POC) 5.4 02/03/2020 03:22 PM     EKG:   sinus rhythm, 62 beats per minute, nonspecific ST-T wave changes. ECHO: July 2014:  LEFT VENTRICLE: Size was normal. Systolic function was normal by visual assessment. Ejection fraction was estimated in the range of 55-60%. No obvious wall motion abnormalities identified in the views obtained. Wall thickness was at the upper limits of normal. DOPPLER: Doppler parameters were consistent with abnormal left ventricular relaxation (grade 1 diastolic dysfunction). RIGHT VENTRICLE: The size was normal. Systolic function was normal. DOPPLER: Estimated peak pressure was in the range of 40-45mmHg. LEFT ATRIUM: Size was at the upper limits of normal. LA volume index was 25 ml/m squared. RIGHT ATRIUM: Size was normal.  MITRAL VALVE: Normal valve structure. DOPPLER: There was no evidence for stenosis. There was mild regurgitation.   AORTIC VALVE: Not well visualized. DOPPLER: There was no stenosis. There was mild regurgitation. TRICUSPID VALVE: Normal valve structure. DOPPLER: There was no evidence for tricuspid stenosis. There was mild to moderate regurgitation. PULMONIC VALVE: Not well visualized, but normal Doppler findings. DOPPLER: There was no stenosis. There was no significant regurgitation. AORTA: The abdominal aorta measured 2.2 cm. The root exhibited normal size. PERICARDIUM: There was no pericardial effusion. The pericardium was normal in appearance. EKG:  Normal sinus rhythm. Non-specific ST-T wave changes. Impression / Plan:      Atrial fibrillation:    Ms. Josie He was diagnosed with atrial fibrillation in the setting of hyperthyroidism. Denies palpitations or known afib episode sInce her last visit. She is in sinus by exam today  She is on Xarelto and she has no side effect from this medication    Diabetes:  Goal hemoglobin A1c less than 7 is recommended from a cardiovascular standpoint. Last hemoglobin A1c 5.4. Hyperlipidemia:  Currently she is on Atorvastatin 40 mg daily. Last lipid profile was November with LDL 45    Hypertension:  Blood pressure today is elevated. She is currently on propranolol and ramipril. She has been having difficulty with pharmacy with fintonic.  We will stop that and try Coreg 12.5 mg twice daily. She has blood pressure monitoring cuff at home and she will keep checking her blood pressure regularly on a daily basis over next 2 weeks and call us back. Chest pain:  Mixed feature. Multiple risk factors to have underlying CAD including diabetes, hyperlipidemia, hypertension. EKG with nonspecific ST changes. Recommend nuclear stress test.  We will proceed with echo to rule out pulmonary hypertension or structural abnormality of the heart    Pre:op:  Patient is scheduled for screening colonoscopy and possible biopsy  From cardiac standpoint, this would be low risk procedure.   She has no symptoms to suggest unstable angina or decompensated heart failure. There is no evidence of fluid overload on exam.  She appears to be in sinus rhythm. It is okay to stop Eliquis 2 days prior to planned colonoscopy. I have advised her to take aspirin while she is off of Eliquis if it is okay. Other than the above, or unless any additional issues arise, I have asked that she follow up in nine months.

## 2020-09-24 NOTE — PROGRESS NOTES
Yanira Bird presents today for   Chief Complaint   Patient presents with   [address] preferred language for health care discussion is english/other. Is someone accompanying this pt? no    Is the patient using any DME equipment during [address]? no    Depression Screening:  3 most recent PHQ Screens 9/24/2020   Little interest or pleasure in doing things Not at all   Feeling down, depressed, irritable, or hopeless Not at all   Total Score PHQ 2 0       Learning Assessment:  Learning Assessment 3/2/2016   PRIMARY LEARNER Patient   HIGHEST LEVEL OF EDUCATION - PRIMARY LEARNER  -   BARRIERS PRIMARY LEARNER -   CO-LEARNER CAREGIVER -   PRIMARY LANGUAGE ENGLISH   LEARNER PREFERENCE PRIMARY DEMONSTRATION     -     -   ANSWERED BY patient   RELATIONSHIP SELF       Abuse Screening:  Abuse Screening Questionnaire 3/2/2016   Do you ever feel afraid of your partner? N   Are you in a relationship with someone who physically or mentally threatens you? N   Is it safe for you to go home? Y       Fall Risk  Fall Risk Assessment, last 12 mths 9/24/2020   Able to walk? Yes   Fall in past 12 months? No       Pt currently taking Anticoagulant therapy? no    Coordination of Care:  1. Have you been to the ER, urgent care clinic since your last visit? Hospitalized since your last visit? no    2. Have you seen or consulted any other health care providers outside of the [address] [place] [address] since your last visit? Include any pap smears or colon screening.  no

## 2020-09-24 NOTE — PATIENT INSTRUCTIONS
Testing   Echo/ Nuclear Stress  Please call Álvaro bagley at [phone]  to schedule an appointment.    All testing is completed at [address] [facility], [facility] [address]      **call office three days after testing for results**     Medication  Stop Propanolol  Start Coreg 12.5 twice a day  **please allow 24-48 hrs for medication to be escribed to pharmacy**

## 2020-09-30 ENCOUNTER — HOSPITAL ENCOUNTER (OUTPATIENT)
Dept: NON INVASIVE DIAGNOSTICS | Age: 70
Discharge: HOME | End: 2020-09-30
Attending: INTERNAL MEDICINE
Payer: MEDICARE

## 2020-09-30 ENCOUNTER — HOSPITAL ENCOUNTER (OUTPATIENT)
Dept: NUCLEAR MEDICINE | Age: 70
Discharge: HOME | End: 2020-09-30
Attending: INTERNAL MEDICINE
Payer: MEDICARE

## 2020-09-30 VITALS
DIASTOLIC BLOOD PRESSURE: 88 MMHG | HEIGHT: 63 IN | WEIGHT: 162 LBS | SYSTOLIC BLOOD PRESSURE: 164 MMHG | BODY MASS INDEX: 28.7 KG/M2

## 2020-09-30 VITALS
HEIGHT: 62 IN | WEIGHT: 162 LBS | BODY MASS INDEX: 29.81 KG/M2 | DIASTOLIC BLOOD PRESSURE: 84 MMHG | SYSTOLIC BLOOD PRESSURE: 171 MMHG

## 2020-09-30 LAB
ECHO AO ROOT DIAM: 3.09 CM
ECHO IVC PROX: 1.35 CM
ECHO LA AREA 4C: 16.72 CM2
ECHO LA MAJOR AXIS: 3.81 CM
ECHO LA MINOR AXIS: 2.15 CM
ECHO LA VOL 2C: 67.48 ML (ref 22–52)
ECHO LA VOL 4C: 42.97 ML (ref 22–52)
ECHO LA VOL BP: 57.24 ML (ref 22–52)
ECHO LA VOL/BSA BIPLANE: 32.37 ML/M2 (ref 16–28)
ECHO LA VOLUME INDEX A2C: 38.16 ML/M2 (ref 16–28)
ECHO LA VOLUME INDEX A4C: 24.3 ML/M2 (ref 16–28)
ECHO LV E' LATERAL VELOCITY: 6.32 CM/S
ECHO LV E' SEPTAL VELOCITY: 7.02 CM/S
ECHO LV EDV A2C: 81.15 ML
ECHO LV EDV A4C: 95.57 ML
ECHO LV EDV BP: 90.55 ML (ref 56–104)
ECHO LV EDV INDEX A4C: 54.1 ML/M2
ECHO LV EDV INDEX BP: 51.2 ML/M2
ECHO LV EDV NDEX A2C: 45.9 ML/M2
ECHO LV EJECTION FRACTION A2C: 43 PERCENT
ECHO LV EJECTION FRACTION A4C: 45 PERCENT
ECHO LV EJECTION FRACTION BIPLANE: 42 PERCENT (ref 55–100)
ECHO LV ESV A2C: 46.15 ML
ECHO LV ESV A4C: 53.01 ML
ECHO LV ESV BP: 52.53 ML (ref 19–49)
ECHO LV ESV INDEX A2C: 26.1 ML/M2
ECHO LV ESV INDEX A4C: 30 ML/M2
ECHO LV ESV INDEX BP: 29.7 ML/M2
ECHO LV INTERNAL DIMENSION DIASTOLIC: 4.6 CM (ref 3.9–5.3)
ECHO LV INTERNAL DIMENSION SYSTOLIC: 2.9 CM
ECHO LV IVSD: 0.96 CM (ref 0.6–0.9)
ECHO LV MASS 2D: 157.7 G (ref 67–162)
ECHO LV MASS INDEX 2D: 89.2 G/M2 (ref 43–95)
ECHO LV POSTERIOR WALL DIASTOLIC: 1.03 CM (ref 0.6–0.9)
ECHO LVOT DIAM: 1.77 CM
ECHO LVOT PEAK GRADIENT: 8.55 MMHG
ECHO LVOT SV: 84 ML
ECHO LVOT VTI: 34.21 CM
ECHO MV A VELOCITY: 85.55 CM/S
ECHO MV E DECELERATION TIME (DT): 0.3 S
ECHO MV E VELOCITY: 82.53 CM/S
ECHO MV E/A RATIO: 0.96
ECHO MV E/E' LATERAL: 13.06
ECHO MV E/E' RATIO (AVERAGED): 12.41
ECHO MV E/E' SEPTAL: 11.76
ECHO RV TAPSE: 2.11 CM (ref 1.5–2)
ECHO TV REGURGITANT MAX VELOCITY: 278.62 CM/S
ECHO TV REGURGITANT PEAK GRADIENT: 31.05 MMHG
LVOT MG: 4.11 MMHG
STRESS BASELINE HR: 56 BPM
STRESS ESTIMATED WORKLOAD: 1.4 METS
STRESS EXERCISE DUR MIN: NORMAL
STRESS PEAK DIAS BP: 112 MMHG
STRESS PEAK SYS BP: 198 MMHG
STRESS PERCENT HR ACHIEVED: 74 %
STRESS POST PEAK HR: 111 BPM
STRESS RATE PRESSURE PRODUCT: NORMAL BPM*MMHG
STRESS ST DEPRESSION: 0 MM
STRESS ST ELEVATION: 0 MM
STRESS TARGET HR: 149 BPM

## 2020-09-30 PROCEDURE — 93017 CV STRESS TEST TRACING ONLY: CPT

## 2020-09-30 PROCEDURE — A9500 TC99M SESTAMIBI: HCPCS

## 2020-09-30 PROCEDURE — 93306 TTE W/DOPPLER COMPLETE: CPT

## 2020-09-30 PROCEDURE — 74011250636 HC RX REV CODE- 250/636: Performed by: INTERNAL MEDICINE

## 2020-09-30 RX ADMIN — REGADENOSON 0.4 MG: 0.08 INJECTION, SOLUTION INTRAVENOUS at 10:56

## 2020-10-01 RX ORDER — RAMIPRIL 10 MG/1
CAPSULE ORAL
Qty: 90 CAP | Refills: 1 | Status: SHIPPED | OUTPATIENT
Start: 2020-10-01 | End: 2021-03-24

## 2020-10-19 ENCOUNTER — APPOINTMENT (OUTPATIENT)
Dept: FAMILY MEDICINE CLINIC | Age: 70
End: 2020-10-19

## 2020-10-19 ENCOUNTER — HOSPITAL ENCOUNTER (OUTPATIENT)
Dept: LAB | Age: 70
Discharge: HOME | End: 2020-10-19
Payer: MEDICARE

## 2020-10-19 DIAGNOSIS — E11.9 TYPE 2 DIABETES MELLITUS WITHOUT COMPLICATION, WITHOUT LONG-TERM CURRENT USE OF INSULIN (HCC): ICD-10-CM

## 2020-10-19 DIAGNOSIS — E08.65 DIABETES MELLITUS DUE TO UNDERLYING CONDITION WITH HYPERGLYCEMIA, WITHOUT LONG-TERM CURRENT USE OF INSULIN (HCC): ICD-10-CM

## 2020-10-19 DIAGNOSIS — M81.0 AGE-RELATED OSTEOPOROSIS WITHOUT CURRENT PATHOLOGICAL FRACTURE: ICD-10-CM

## 2020-10-19 LAB
ALBUMIN SERPL-MCNC: 3.8 G/DL (ref 3.4–5)
ALBUMIN/GLOB SERPL: 1.3 {RATIO} (ref 0.8–1.7)
ALP SERPL-CCNC: 58 U/L (ref 45–117)
ALT SERPL-CCNC: 26 U/L (ref 13–56)
ANION GAP SERPL CALC-SCNC: 6 MMOL/L (ref 3–18)
AST SERPL-CCNC: 18 U/L (ref 10–38)
BILIRUB SERPL-MCNC: 1 MG/DL (ref 0.2–1)
BUN SERPL-MCNC: 12 MG/DL (ref 7–18)
BUN/CREAT SERPL: 19 (ref 12–20)
CALCIUM SERPL-MCNC: 9.4 MG/DL (ref 8.5–10.1)
CHLORIDE SERPL-SCNC: 108 MMOL/L (ref 100–111)
CHOLEST SERPL-MCNC: 120 MG/DL
CO2 SERPL-SCNC: 28 MMOL/L (ref 21–32)
CREAT SERPL-MCNC: 0.62 MG/DL (ref 0.6–1.3)
CREAT UR-MCNC: 60 MG/DL (ref 30–125)
EST. AVERAGE GLUCOSE BLD GHB EST-MCNC: 111 MG/DL
GLOBULIN SER CALC-MCNC: 2.9 G/DL (ref 2–4)
GLUCOSE SERPL-MCNC: 85 MG/DL (ref 74–99)
HBA1C MFR BLD: 5.5 % (ref 4.2–5.6)
HDLC SERPL-MCNC: 53 MG/DL (ref 40–60)
HDLC SERPL: 2.3 {RATIO} (ref 0–5)
LDLC SERPL CALC-MCNC: 39.2 MG/DL (ref 0–100)
LIPID PROFILE,FLP: NORMAL
MICROALBUMIN UR-MCNC: 0.59 MG/DL (ref 0–3)
MICROALBUMIN/CREAT UR-RTO: 10 MG/G (ref 0–30)
POTASSIUM SERPL-SCNC: 3.8 MMOL/L (ref 3.5–5.5)
PROT SERPL-MCNC: 6.7 G/DL (ref 6.4–8.2)
SODIUM SERPL-SCNC: 142 MMOL/L (ref 136–145)
TRIGL SERPL-MCNC: 139 MG/DL (ref ?–150)
TSH SERPL DL<=0.05 MIU/L-ACNC: 1.89 UIU/ML (ref 0.36–3.74)
VLDLC SERPL CALC-MCNC: 27.8 MG/DL

## 2020-10-19 PROCEDURE — 80061 LIPID PANEL: CPT

## 2020-10-19 PROCEDURE — 36415 COLL VENOUS BLD VENIPUNCTURE: CPT

## 2020-10-19 PROCEDURE — 82043 UR ALBUMIN QUANTITATIVE: CPT

## 2020-10-19 PROCEDURE — 84443 ASSAY THYROID STIM HORMONE: CPT

## 2020-10-19 PROCEDURE — 80053 COMPREHEN METABOLIC PANEL: CPT

## 2020-10-19 PROCEDURE — 83036 HEMOGLOBIN GLYCOSYLATED A1C: CPT

## 2020-10-30 ENCOUNTER — VIRTUAL VISIT (OUTPATIENT)
Dept: FAMILY MEDICINE CLINIC | Age: 70
End: 2020-10-30
Payer: MEDICARE

## 2020-10-30 DIAGNOSIS — E05.00 GRAVES DISEASE: ICD-10-CM

## 2020-10-30 DIAGNOSIS — Z12.31 ENCOUNTER FOR SCREENING MAMMOGRAM FOR MALIGNANT NEOPLASM OF BREAST: Primary | ICD-10-CM

## 2020-10-30 DIAGNOSIS — M81.0 AGE-RELATED OSTEOPOROSIS WITHOUT CURRENT PATHOLOGICAL FRACTURE: ICD-10-CM

## 2020-10-30 DIAGNOSIS — E11.9 TYPE 2 DIABETES MELLITUS WITHOUT COMPLICATION, WITHOUT LONG-TERM CURRENT USE OF INSULIN (HCC): ICD-10-CM

## 2020-10-30 DIAGNOSIS — K63.5 POLYP OF COLON, UNSPECIFIED PART OF COLON, UNSPECIFIED TYPE: ICD-10-CM

## 2020-10-30 DIAGNOSIS — I48.0 PAF (PAROXYSMAL ATRIAL FIBRILLATION) (HCC): ICD-10-CM

## 2020-10-30 PROCEDURE — G8536 NO DOC ELDER MAL SCRN: HCPCS | Performed by: INTERNAL MEDICINE

## 2020-10-30 PROCEDURE — G8428 CUR MEDS NOT DOCUMENT: HCPCS | Performed by: INTERNAL MEDICINE

## 2020-10-30 PROCEDURE — 1090F PRES/ABSN URINE INCON ASSESS: CPT | Performed by: INTERNAL MEDICINE

## 2020-10-30 PROCEDURE — G8756 NO BP MEASURE DOC: HCPCS | Performed by: INTERNAL MEDICINE

## 2020-10-30 PROCEDURE — 99443 PR PHYS/QHP TELEPHONE EVALUATION 21-30 MIN: CPT | Performed by: INTERNAL MEDICINE

## 2020-10-30 PROCEDURE — 1101F PT FALLS ASSESS-DOCD LE1/YR: CPT | Performed by: INTERNAL MEDICINE

## 2020-10-30 PROCEDURE — 2022F DILAT RTA XM EVC RTNOPTHY: CPT | Performed by: INTERNAL MEDICINE

## 2020-10-30 PROCEDURE — G0463 HOSPITAL OUTPT CLINIC VISIT: HCPCS | Performed by: INTERNAL MEDICINE

## 2020-10-30 PROCEDURE — 3017F COLORECTAL CA SCREEN DOC REV: CPT | Performed by: INTERNAL MEDICINE

## 2020-10-30 PROCEDURE — 3044F HG A1C LEVEL LT 7.0%: CPT | Performed by: INTERNAL MEDICINE

## 2020-10-30 PROCEDURE — G8510 SCR DEP NEG, NO PLAN REQD: HCPCS | Performed by: INTERNAL MEDICINE

## 2020-10-30 PROCEDURE — G8419 CALC BMI OUT NRM PARAM NOF/U: HCPCS | Performed by: INTERNAL MEDICINE

## 2020-10-30 PROCEDURE — G9899 SCRN MAM PERF RSLTS DOC: HCPCS | Performed by: INTERNAL MEDICINE

## 2020-10-30 RX ORDER — LANOLIN ALCOHOL/MO/W.PET/CERES
1000 CREAM (GRAM) TOPICAL DAILY
COMMUNITY

## 2020-10-30 RX ORDER — BLOOD-GLUCOSE METER
EACH MISCELLANEOUS
Qty: 1 EACH | Refills: 1 | Status: SHIPPED | OUTPATIENT
Start: 2020-10-30 | End: 2020-11-18 | Stop reason: SDUPTHER

## 2020-10-30 RX ORDER — BLOOD-GLUCOSE CONTROL, NORMAL
EACH MISCELLANEOUS
Qty: 100 LANCET | Refills: 3 | Status: SHIPPED | OUTPATIENT
Start: 2020-10-30 | End: 2020-11-18 | Stop reason: SDUPTHER

## 2020-10-30 NOTE — PROGRESS NOTES
1. Have you been to the ER, urgent care clinic since your last visit? Hospitalized since your last visit? No    2. Have you seen or consulted any other health care providers outside of the [address] [place] [address] since your last visit? Include any pap smears or colon screening.  No    Chief Complaint   Patient presents with    Follow Up Chronic Condition    Diabetes    Hypertension

## 2020-10-30 NOTE — PROGRESS NOTES
Moiz Marquez is a 68 y.o. female, evaluated via audio-only technology on 10/30/2020 for Follow Up Chronic Condition; Diabetes; and Hypertension  . Assessment & Plan:   Diagnoses and all orders for this visit:    1. Encounter for screening mammogram for malignant neoplasm of breast  -     KIM MAMMO BI SCREENING INCL CAD; Future    2. Type 2 diabetes mellitus without complication, without long-term current use of insulin (HCC)  -     glucose blood VI test strips (OneTouch Ultra Blue Test Strip) strip; Use to check blood sugar once a day. -     lancets (OneTouch Delica Lancets) 30 gauge misc; Use to check blood sugar once a day  -     Blood-Glucose Meter (OneTouch Ultra2 Meter) misc; Use to check blood sugar daily           Assessment/Plan:       Osteoporosis- continue prolia-last done 9/2020 and next injection due 3/2021. repeat DEXA 12/2020. on prolia about 1 year now (since approx 9/2019). Diabetes type 2- controlled- - excellent control. Continue metformin. Lab Results   Component Value Date/Time    Hemoglobin A1c 5.5 10/19/2020 01:53 PM    Hemoglobin A1c (POC) 5.4 02/03/2020 03:22 PM       Colon polypsmultiplehad 6 removed last colonoscopy 7/2020- multiple polyps removed- repeat in 2 years Discussed and importance of follow-up with GI for this reviewed to prevent additional issues. Hyperthyroidism- continue endo follow up. tsh normal 10/19/20. Hyperlipidemia- continue statin. Atrial fibrillation- seeing cardiology- DR. Chery- on xarelto. rtc 6 months with DEXA (does this at [facility]). And labs. 12  Subjective:   Osteoporosis- improved bmd on last DEXA 12/10/18- lowest tscore previously was -2.8. Diabetes- no complications- last J9L 4.3% 7/2019. Eating more sweets recently- doesn't have a glucometer at home. Hyperthyroidism- due to Graves- sees endo for this- Dr Salina Benoit- stable.    Colon polyps colonoscopy done by Dr. Jacinda Fitzgerald. Pt states she had a colonoscopy done this year and next is in 2 more years. Hyperlipidemia- on statin - no myalgia, no abd pain      Prior to Admission medications    Medication Sig Start Date End Date Taking? Authorizing Provider   cyanocobalamin (Vitamin B-12) 1,000 mcg tablet Take 1,000 mcg by mouth daily. Yes Provider, Historical   glucose blood VI test strips (OneTouch Ultra Blue Test Strip) strip Use to check blood sugar once a day. 10/30/20  Yes Susan Fletcher MD   lancets [facility] Lancets) 30 gauge misc Use to check blood sugar once a day 10/30/20  Yes Susan Fletcher MD   Blood-Glucose Meter (OneTouch Ultra2 Meter) misc Use to check blood sugar daily 10/30/20  Yes Susan Fletcher MD   ramipriL (ALTACE) 10 mg capsule TAKE 1 CAPSULE DAILY 10/1/20  Yes Heidy Scott MD   carvediloL (COREG) 12.5 mg tablet Take 1 Tab by mouth two (2) times daily (with meals). 9/24/20  Yes Heidy Scott MD   metFORMIN (GLUCOPHAGE) 1,000 mg tablet Take 1 Tab by mouth two (2) times daily (with meals). 9/1/20  Yes Susan Fletcher MD   atorvastatin (LIPITOR) 40 mg tablet Take 1 Tab by mouth daily. 7/2/20  Yes Susan Fletcher MD   rivaroxaban Equilla Manus) 20 mg tab tablet Take 1 Tab by mouth daily. 6/1/20  Yes Heidy Scott MD   Blood-Glucose Meter monitoring kit Use to check blood sugar daily 2/3/20  Yes Susan Fletcher MD   cholecalciferol, VITAMIN D3, (VITAMIN D3) 5,000 unit tab tablet Take 5,000 Units by mouth daily. Yes Provider, Historical   methimazole (TAPAZOLE) 10 mg tablet Take 5 mg by mouth every Monday, Wednesday, Friday. 6/1/15  Yes Casisdy Moraes, DO   omega 3-dha-epa-fish oil 100-160-1,000 mg cap Take 1,000 mg by mouth two (2) times a day. Yes Provider, Historical   multivitamin (ONE A DAY) tablet Take 1 Tab by mouth daily. Yes Provider, Historical   acetaminophen (TYLENOL) 325 mg tablet Take  by mouth every four (4) hours as needed for Pain.    Yes Provider, Historical     Patient Active Problem List   Diagnosis Code    Cataracts, bilateral H26.9    Osteopenia M85.80    Vitamin D deficiency E55.9    Colon polyps K63.5    Multiple thyroid nodules E04.2    Hypertension I11.9    Graves disease E05.00    Osteoporosis M81.0    Plantar fasciitis, bilateral M72.2    Advance directive declined by patient Z78.9    Atrial fibrillation I48.0    Diabetes E11.9    Dyslipidemia E78.5    Type 2 diabetes mellitus without complication, without long-term current use of insulin ([facility]) E11.9    Other hyperlipidemia E78.49    Chronic right shoulder pain M25.511, G89.29     Patient Active Problem List    Diagnosis Date Noted    Chronic right shoulder pain 03/19/2019    Other hyperlipidemia 11/19/2018    Type 2 diabetes mellitus without complication, without long-term current use of insulin ([facility] [address] 05/10/2017    Atrial fibrillation     Diabetes     Dyslipidemia     Advance directive declined by patient 12/04/2015    Plantar fasciitis, bilateral     Osteoporosis     Graves disease     Hypertension     Multiple thyroid nodules     Colon polyps     Vitamin D deficiency     Cataracts, bilateral     Osteopenia      Current Outpatient Medications   Medication Sig Dispense Refill    cyanocobalamin (Vitamin B-12) 1,000 mcg tablet Take 1,000 mcg by mouth daily.  glucose blood VI test strips (OneTouch Ultra Blue Test Strip) strip Use to check blood sugar once a day. 100 Strip 3    lancets (OneTouch Delica Lancets) 30 gauge misc Use to check blood sugar once a day 100 Lancet 3    Blood-Glucose Meter (OneTouch Ultra2 Meter) misc Use to check blood sugar daily 1 Each 1    ramipriL (ALTACE) 10 mg capsule TAKE 1 CAPSULE DAILY 90 Cap 1    carvediloL (COREG) 12.5 mg tablet Take 1 Tab by mouth two (2) times daily (with meals). 180 Tab 3    metFORMIN (GLUCOPHAGE) 1,000 mg tablet Take 1 Tab by mouth two (2) times daily (with meals). 180 Tab 3    atorvastatin (LIPITOR) 40 mg tablet Take 1 Tab by mouth daily.  90 Tab 2    rivaroxaban (XARELTO) 20 mg tab tablet Take 1 Tab by mouth daily. 90 Tab 3    Blood-Glucose Meter monitoring kit Use to check blood sugar daily 1 Kit 0    cholecalciferol, VITAMIN D3, (VITAMIN D3) 5,000 unit tab tablet Take 5,000 Units by mouth daily.  methimazole (TAPAZOLE) 10 mg tablet Take 5 mg by mouth every Monday, Wednesday, Friday.  omega 3-dha-epa-fish oil 100-160-1,000 mg cap Take 1,000 mg by mouth two (2) times a day.  multivitamin (ONE A DAY) tablet Take 1 Tab by mouth daily.  acetaminophen (TYLENOL) 325 mg tablet Take  by mouth every four (4) hours as needed for Pain.        Allergies   Allergen Reactions    Sulfa (Sulfonamide Antibiotics) Other (comments)     Renal failure      Past Medical History:   Diagnosis Date    Atrial fibrillation     CHADS score 2  (-CHF, +HTN, -AGE, +DM, -CVA)    Bilateral cataracts     Colon polyps     7/14  repeat 5 yrs - Dr. Katty Lay    Diabetes     Dyslipidemia     Electronic cigarette use     Graves disease     5/14 Grave's disease, Dr. Mejia Herita History of seasonal allergies     Hypertension     Hypertension     Multiple thyroid nodules     5/14    Osteoporosis     last dexa 12/14    Plantar fasciitis, bilateral     Dr. Kirill Marrero Sleep apnea     mild sleep apnea, no CPAP    Vitamin D deficiency      Past Surgical History:   Procedure Laterality Date    HX CATARACT REMOVAL      bilateral    HX COLONOSCOPY      7/14  Dr. Katty Lay - repeat 7/19    HX LAP CHOLECYSTECTOMY      2000    HX ORTHOPAEDIC      1965  left knee    HX ORTHOPAEDIC Right     right shoulder surgery x2    HX SEPTOPLASTY      HX WISDOM TEETH EXTRACTION       Family History   Problem Relation Age of Onset   [facility] Hypertension Father     Heart Disease Father     Kidney Disease Father    [facility] Glaucoma Father     Stroke Father     Thyroid Disease Father         high thyroid    Diabetes Mother     Hypertension Mother     Heart Disease Mother         chf    Kidney Disease Mother  Thyroid Disease Mother         low thyroid    Diabetes Sister     Stroke Sister     Hypertension Sister     Heart Disease Sister     Hypertension Brother     Diabetes Brother     Heart Disease Brother      Social History     Tobacco Use    Smoking status: Former Smoker     Packs/day: 0.50     Years: 15.00     Pack years: 7.50     Types: Cigarettes     Last attempt to quit:      Years since quittin.8    Smokeless tobacco: Never Used    Tobacco comment: electronic cigarettes   Substance Use Topics    Alcohol use: Yes     Alcohol/week: 3.0 standard drinks     Types: 1 Glasses of wine, 1 Cans of beer, 1 Shots of liquor per week     Comment: occasionally       ROS  Cardiac- no chest pain or palpitations  Pulmonary- no sob or wheezes  GI- no n/v or diarrhea. No flowsheet data found. Justine Alarcon, who was evaluated through a patient-initiated, synchronous (real-time) audio only encounter, and/or her healthcare decision maker, is aware that it is a billable service, with coverage as determined by her insurance carrier. She provided verbal consent to proceed: Yes. She has not had a related appointment within my department in the past 7 days or scheduled within the next 24 hours.       Total Time: minutes: 21-30 minutes    Jacob Blackman MD

## 2020-11-14 ENCOUNTER — HOSPITAL ENCOUNTER (OUTPATIENT)
Dept: MAMMOGRAPHY | Age: 70
Discharge: HOME | End: 2020-11-14
Attending: INTERNAL MEDICINE
Payer: MEDICARE

## 2020-11-14 DIAGNOSIS — Z12.31 ENCOUNTER FOR SCREENING MAMMOGRAM FOR MALIGNANT NEOPLASM OF BREAST: ICD-10-CM

## 2020-11-14 PROCEDURE — 77063 BREAST TOMOSYNTHESIS BI: CPT

## 2020-11-18 DIAGNOSIS — E11.9 TYPE 2 DIABETES MELLITUS WITHOUT COMPLICATION, WITHOUT LONG-TERM CURRENT USE OF INSULIN (HCC): ICD-10-CM

## 2020-11-19 RX ORDER — BLOOD-GLUCOSE CONTROL, NORMAL
EACH MISCELLANEOUS
Qty: 100 LANCET | Refills: 3 | Status: SHIPPED | OUTPATIENT
Start: 2020-11-19

## 2020-11-19 RX ORDER — BLOOD-GLUCOSE METER
EACH MISCELLANEOUS
Qty: 1 EACH | Refills: 1 | Status: SHIPPED | OUTPATIENT
Start: 2020-11-19

## 2021-01-07 ENCOUNTER — OFFICE VISIT (OUTPATIENT)
Dept: CARDIOLOGY CLINIC | Age: 71
End: 2021-01-07
Payer: MEDICARE

## 2021-01-07 VITALS
HEART RATE: 61 BPM | HEIGHT: 62 IN | OXYGEN SATURATION: 98 % | DIASTOLIC BLOOD PRESSURE: 52 MMHG | WEIGHT: 159.4 LBS | SYSTOLIC BLOOD PRESSURE: 140 MMHG | RESPIRATION RATE: 16 BRPM | TEMPERATURE: 97.7 F | BODY MASS INDEX: 29.33 KG/M2

## 2021-01-07 DIAGNOSIS — R07.9 CHEST PAIN, UNSPECIFIED TYPE: ICD-10-CM

## 2021-01-07 DIAGNOSIS — I48.0 PAROXYSMAL ATRIAL FIBRILLATION (HCC): Primary | ICD-10-CM

## 2021-01-07 PROCEDURE — 1101F PT FALLS ASSESS-DOCD LE1/YR: CPT | Performed by: INTERNAL MEDICINE

## 2021-01-07 PROCEDURE — G9899 SCRN MAM PERF RSLTS DOC: HCPCS | Performed by: INTERNAL MEDICINE

## 2021-01-07 PROCEDURE — 99213 OFFICE O/P EST LOW 20 MIN: CPT | Performed by: INTERNAL MEDICINE

## 2021-01-07 PROCEDURE — G8536 NO DOC ELDER MAL SCRN: HCPCS | Performed by: INTERNAL MEDICINE

## 2021-01-07 PROCEDURE — 3017F COLORECTAL CA SCREEN DOC REV: CPT | Performed by: INTERNAL MEDICINE

## 2021-01-07 PROCEDURE — 1090F PRES/ABSN URINE INCON ASSESS: CPT | Performed by: INTERNAL MEDICINE

## 2021-01-07 PROCEDURE — G8428 CUR MEDS NOT DOCUMENT: HCPCS | Performed by: INTERNAL MEDICINE

## 2021-01-07 PROCEDURE — G8432 DEP SCR NOT DOC, RNG: HCPCS | Performed by: INTERNAL MEDICINE

## 2021-01-07 PROCEDURE — G8419 CALC BMI OUT NRM PARAM NOF/U: HCPCS | Performed by: INTERNAL MEDICINE

## 2021-01-07 NOTE — PROGRESS NOTES
[facility]    HPI:   Ms. Angela Russo is a 65 y.o. female with history of diabetes and hypertension. She has atrial fibrillation that was diagnosed in the clinical setting of a hyperthyroid state in 2014. Paroxsymal in nature. Patient is here today for follow-up appointment. Denies any resting or exertional chest pain or chest pressure to suggest angina or any dyspnea to suggest heart failure. No presyncope or syncope  Denies any PND or LE edema. Taking all medications regularly. Has finished echo and nuclear since last time. Past Medical History:   Diagnosis Date    Atrial fibrillation     CHADS score 2  (-CHF, +HTN, -AGE, +DM, -CVA)    Bilateral cataracts     Colon polyps     7/14  repeat 5 yrs - Dr. Estela Slater    Diabetes     Dyslipidemia     Electronic cigarette use     Graves disease     5/14 Grave's disease, Dr. Raúl Irizarry History of seasonal allergies     Hypertension     Hypertension     Multiple thyroid nodules     5/14    Osteoporosis     last dexa 12/14    Plantar fasciitis, bilateral     Dr. Castellanos Found Sleep apnea     mild sleep apnea, no CPAP    Vitamin D deficiency        Past Surgical History:   Procedure Laterality Date    HX CATARACT REMOVAL      bilateral    HX COLONOSCOPY      7/14  Dr. Estela Slater - repeat 7/19    HX LAP CHOLECYSTECTOMY      2000    HX ORTHOPAEDIC      1965  left knee    HX ORTHOPAEDIC Right     right shoulder surgery x2    HX SEPTOPLASTY      HX WISDOM TEETH EXTRACTION         Current Outpatient Medications   Medication Sig    glucose blood VI test strips (OneTouch Ultra Blue Test Strip) strip Use to check blood sugar once a day.  lancets (OneTouch Delica Lancets) 30 gauge misc Use to check blood sugar once a day    Blood-Glucose Meter (OneTouch Ultra2 Meter) misc Use to check blood sugar daily    cyanocobalamin (Vitamin B-12) 1,000 mcg tablet Take 1,000 mcg by mouth daily.     ramipriL (ALTACE) 10 mg capsule TAKE 1 CAPSULE DAILY  carvediloL (COREG) 12.5 mg tablet Take 1 Tab by mouth two (2) times daily (with meals).  metFORMIN (GLUCOPHAGE) 1,000 mg tablet Take 1 Tab by mouth two (2) times daily (with meals).  atorvastatin (LIPITOR) 40 mg tablet Take 1 Tab by mouth daily.  rivaroxaban (XARELTO) 20 mg tab tablet Take 1 Tab by mouth daily.  Blood-Glucose Meter monitoring kit Use to check blood sugar daily    cholecalciferol, VITAMIN D3, (VITAMIN D3) 5,000 unit tab tablet Take 5,000 Units by mouth daily.  methimazole (TAPAZOLE) 10 mg tablet Take 5 mg by mouth every Monday, Wednesday, Friday.  omega 3-dha-epa-fish oil 100-160-1,000 mg cap Take 1,000 mg by mouth two (2) times a day.  multivitamin (ONE A DAY) tablet Take 1 Tab by mouth daily.  acetaminophen (TYLENOL) 325 mg tablet Take  by mouth every four (4) hours as needed for Pain. No current facility-administered medications for this visit. Allergies   Allergen Reactions    Sulfa (Sulfonamide Antibiotics) Other (comments)     Renal failure        Family History:    Heart Disease Father        Social History:   She  reports that she quit smoking about 7 years ago. Her smoking use included cigarettes. She has a 7.50 pack-year smoking history. She has never used smokeless tobacco.  She  reports current alcohol use of about 3.0 standard drinks of alcohol per week. Physical:   Vitals:   BP: (!) 140/52  HR: 61  Wt: 159 lb 6.4 oz (72.3 kg)    Exam:   General: Older woman, no complaints, no distress.     Head/Neck: No JVD    No bruits. Lungs:  No respiratory distress. No rales or rhonchi  Heart:  Regular.  No murmur. No rubs or gallops. Abdomen: Soft. Bowel sounds present    No edema.     Neurological: Alert and oriented to person, place, time. No gross neurological deficit. Skin:  Warm and dry.     Review of Data:   LABS:   Lab Results   Component Value Date/Time    WBC 6.2 07/23/2019 10:13 AM    HGB 13.0 07/23/2019 10:13 AM    HCT 38.9 07/23/2019 10:13 AM    PLATELET 016 76/15/0249 10:13 AM     Lab Results   Component Value Date/Time    Sodium 142 10/19/2020 01:53 PM    Potassium 3.8 10/19/2020 01:53 PM    Chloride 108 10/19/2020 01:53 PM    CO2 28 10/19/2020 01:53 PM    Anion gap 6 10/19/2020 01:53 PM    Glucose 85 10/19/2020 01:53 PM    BUN 12 10/19/2020 01:53 PM    Creatinine 0.62 10/19/2020 01:53 PM    BUN/Creatinine ratio 19 10/19/2020 01:53 PM    GFR est AA >60 10/19/2020 01:53 PM    GFR est non-AA >60 10/19/2020 01:53 PM    Calcium 9.4 10/19/2020 01:53 PM    Bilirubin, total 1.0 10/19/2020 01:53 PM    Alk. phosphatase 58 10/19/2020 01:53 PM    Protein, total 6.7 10/19/2020 01:53 PM    Albumin 3.8 10/19/2020 01:53 PM    Globulin 2.9 10/19/2020 01:53 PM    A-G Ratio 1.3 10/19/2020 01:53 PM    ALT (SGPT) 26 10/19/2020 01:53 PM     Lab Results   Component Value Date/Time    Cholesterol, total 120 10/19/2020 01:53 PM    HDL Cholesterol 53 10/19/2020 01:53 PM    LDL, calculated 39.2 10/19/2020 01:53 PM    Triglyceride 139 10/19/2020 01:53 PM    CHOL/HDL Ratio 2.3 10/19/2020 01:53 PM     Lab Results   Component Value Date/Time    Hemoglobin A1c 5.5 10/19/2020 01:53 PM    Hemoglobin A1c (POC) 5.4 02/03/2020 03:22 PM     EKG:   sinus rhythm, 62 beats per minute, nonspecific ST-T wave changes. ECHO: 09/20  Left Ventricle Normal cavity size, wall thickness, systolic function (ejection fraction normal) and diastolic function. Wall motion: normal. The estimated EF is 55 - 60%. Visually measured ejection fraction. Wall Scoring The left ventricular wall motion is normal.            Left Atrium Normal cavity size. Left Atrium volume index is 32.34 mL/m2. Right Ventricle Normal cavity size and global systolic function. Right Atrium Normal cavity size. Aortic Valve Trileaflet valve structure and no stenosis. Mild aortic valve regurgitation. Mitral Valve No stenosis. Mitral valve non-specific thickening. Trace regurgitation. Tricuspid Valve No stenosis. Non-specific thickening. Mild regurgitation. Pulmonic Valve Pulmonic valve not well visualized. No stenosis and no regurgitation. Aorta Normal aortic root. Pulmonary Artery Pulmonary arterial systolic pressure (PASP) is 34 mmHg. Pulmonary hypertension not suggested by Doppler findings. EKG:  Normal sinus rhythm. Non-specific ST-T wave changes. STRESS TEST (09/20)  · Baseline ECG: Sinus bradycardia, non-specific ST-T wave abnormalities. · Gated SPECT: Left ventricular function post-stress was normal. Calculated ejection fraction is 82%. · Myocardial perfusion imaging supports a low risk stress test.  · There was no convincing evidence of significant reversible defect to suggest on going major ischemia. No fixed defect to suggest infarct. Impression / Plan:      Atrial fibrillation:    Ms. Ashvin Sena was diagnosed with atrial fibrillation in the setting of hyperthyroidism. Denies palpitations or known afib episode sInce her last visit. She is in sinus by exam today again  She is on Xarelto and she has no side effect from this medication    Diabetes:  Goal hemoglobin A1c less than 7 is recommended from a cardiovascular standpoint. Last hemoglobin A1c 5.5. Hyperlipidemia:  Currently she is on Atorvastatin 40 mg daily. Last lipid profile showed LDL 40 in 10/20    Hypertension:  Blood pressure today is 140/52 mm Hg. Continue ramipril, coreg. Chest pain:  No CP or angina symptoms since last visit  Nuclear stress test and echo in 09/20, low risk as above. Other than the above, or unless any additional issues arise, I have asked that she follow up in nine months.

## 2021-01-07 NOTE — PROGRESS NOTES
Christel Marcial presents today for   Chief Complaint   Patient presents with    Follow-up     Cardiac testing       Christel Marcial preferred language for health care discussion is english/other. Personal Protective Equipment:   Personal Protective Equipment was used including: mask-surgical and hands-gloves. Patient was placed on no precaution(s). Patient was masked. Is someone accompanying this pt? No    Is the patient using any DME equipment during OV? No    Depression Screening:  3 most recent PHQ Screens 10/30/2020   Little interest or pleasure in doing things Not at all   Feeling down, depressed, irritable, or hopeless Not at all   Total Score PHQ 2 0       Learning Assessment:  Learning Assessment 3/2/2016   PRIMARY LEARNER Patient   HIGHEST LEVEL OF EDUCATION - PRIMARY LEARNER  -   BARRIERS PRIMARY LEARNER -   [address]  CAREGIVER -   PRIMARY LANGUAGE ENGLISH   LEARNER PREFERENCE PRIMARY DEMONSTRATION     -     -   ANSWERED BY patient   RELATIONSHIP SELF       Abuse Screening:  Abuse Screening Questionnaire 3/2/2016   Do you ever feel afraid of your partner? N   Are you in a relationship with someone who physically or mentally threatens you? N   Is it safe for you to go home? Y       Fall Risk  Fall Risk Assessment, last 12 mths 10/30/2020   Able to walk? Yes   Fall in past 12 months? No       Pt currently taking Anticoagulant therapy? No    Coordination of Care:  1. Have you been to the ER, urgent care clinic since your last visit? Hospitalized since your last visit? No    2. Have you seen or consulted any other health care providers outside of the [address] [place] [address] since your last visit? Include any pap smears or colon screening.  No

## 2021-03-01 ENCOUNTER — HOSPITAL ENCOUNTER (OUTPATIENT)
Dept: INFUSION THERAPY | Age: 71
Discharge: HOME | End: 2021-03-01
Payer: MEDICARE

## 2021-03-01 VITALS
HEART RATE: 59 BPM | TEMPERATURE: 98 F | DIASTOLIC BLOOD PRESSURE: 53 MMHG | SYSTOLIC BLOOD PRESSURE: 98 MMHG | OXYGEN SATURATION: 96 %

## 2021-03-01 LAB
ANION GAP SERPL CALC-SCNC: 7 MMOL/L (ref 3–18)
BUN SERPL-MCNC: 11 MG/DL (ref 7–18)
BUN/CREAT SERPL: 17 (ref 12–20)
CALCIUM SERPL-MCNC: 9.8 MG/DL (ref 8.5–10.1)
CHLORIDE SERPL-SCNC: 107 MMOL/L (ref 100–111)
CO2 SERPL-SCNC: 27 MMOL/L (ref 21–32)
CREAT SERPL-MCNC: 0.63 MG/DL (ref 0.6–1.3)
GLUCOSE SERPL-MCNC: 107 MG/DL (ref 74–99)
MAGNESIUM SERPL-MCNC: 2 MG/DL (ref 1.6–2.6)
PHOSPHATE SERPL-MCNC: 4 MG/DL (ref 2.5–4.9)
POTASSIUM SERPL-SCNC: 3.9 MMOL/L (ref 3.5–5.5)
SODIUM SERPL-SCNC: 141 MMOL/L (ref 136–145)

## 2021-03-01 PROCEDURE — 83735 ASSAY OF MAGNESIUM: CPT

## 2021-03-01 PROCEDURE — 80048 BASIC METABOLIC PNL TOTAL CA: CPT

## 2021-03-01 PROCEDURE — 36415 COLL VENOUS BLD VENIPUNCTURE: CPT

## 2021-03-01 PROCEDURE — 84100 ASSAY OF PHOSPHORUS: CPT

## 2021-03-01 NOTE — PROGRESS NOTES
[facility] Progress Note    Date: 2021    Name: Alexandra Colbert    MRN: [id number]         : 1950    Peripheral Lab Draw      Ms. Alonso to [facility], ambulatory at 9563 accompanied by self. Pt was assessed and education was provided. Ms. Hazel Dance vitals were reviewed and patient was observed for 5 minutes prior to treatment. Visit Vitals  BP (!) 98/53 (BP 1 Location: Left arm, BP Patient Position: Sitting)   Pulse (!) 59   Temp 98 °F (36.7 °C)   SpO2 96%       Blood obtained peripherally from left arm x 1 attempt with butterfly needle and sent to lab for Bmp, Magnesium and Phosphorus per written orders. No bleeding or hematoma noted at site. Gauze and coban applied. Ms. Jose Elias tolerated the phlebotomy, and had no complaints. Patient armband removed and shredded. Ms. Jose Elias was discharged from [place] [address] in stable condition at 0906.      Minh Eller Phlebotomist PCT  2021  1:39 PM

## 2021-03-04 RX ORDER — DIPHENHYDRAMINE HYDROCHLORIDE 50 MG/ML
50 INJECTION, SOLUTION INTRAMUSCULAR; INTRAVENOUS AS NEEDED
Status: CANCELLED
Start: 2021-03-05

## 2021-03-04 RX ORDER — ONDANSETRON 2 MG/ML
8 INJECTION INTRAMUSCULAR; INTRAVENOUS AS NEEDED
Status: CANCELLED | OUTPATIENT
Start: 2021-03-05

## 2021-03-04 RX ORDER — ALBUTEROL SULFATE 0.83 MG/ML
2.5 SOLUTION RESPIRATORY (INHALATION) AS NEEDED
Status: CANCELLED
Start: 2021-03-05

## 2021-03-04 RX ORDER — HYDROCORTISONE SODIUM SUCCINATE 100 MG/2ML
100 INJECTION, POWDER, FOR SOLUTION INTRAMUSCULAR; INTRAVENOUS AS NEEDED
Status: CANCELLED | OUTPATIENT
Start: 2021-03-05

## 2021-03-04 RX ORDER — ACETAMINOPHEN 325 MG/1
650 TABLET ORAL AS NEEDED
Status: CANCELLED
Start: 2021-03-05

## 2021-03-04 RX ORDER — DIPHENHYDRAMINE HYDROCHLORIDE 50 MG/ML
25 INJECTION, SOLUTION INTRAMUSCULAR; INTRAVENOUS AS NEEDED
Status: CANCELLED
Start: 2021-03-05

## 2021-03-04 RX ORDER — EPINEPHRINE 1 MG/ML
0.3 INJECTION, SOLUTION, CONCENTRATE INTRAVENOUS AS NEEDED
Status: CANCELLED | OUTPATIENT
Start: 2021-03-05

## 2021-03-05 ENCOUNTER — HOSPITAL ENCOUNTER (OUTPATIENT)
Dept: INFUSION THERAPY | Age: 71
Discharge: HOME | End: 2021-03-05
Payer: MEDICARE

## 2021-03-05 VITALS
RESPIRATION RATE: 18 BRPM | WEIGHT: 159 LBS | BODY MASS INDEX: 29.08 KG/M2 | TEMPERATURE: 96.8 F | SYSTOLIC BLOOD PRESSURE: 138 MMHG | OXYGEN SATURATION: 98 % | HEART RATE: 58 BPM | DIASTOLIC BLOOD PRESSURE: 58 MMHG

## 2021-03-05 DIAGNOSIS — M81.0 AGE-RELATED OSTEOPOROSIS WITHOUT CURRENT PATHOLOGICAL FRACTURE: Primary | ICD-10-CM

## 2021-03-05 PROCEDURE — 74011250636 HC RX REV CODE- 250/636: Performed by: INTERNAL MEDICINE

## 2021-03-05 PROCEDURE — 96372 THER/PROPH/DIAG INJ SC/IM: CPT

## 2021-03-05 RX ORDER — ONDANSETRON 2 MG/ML
8 INJECTION INTRAMUSCULAR; INTRAVENOUS AS NEEDED
Status: CANCELLED | OUTPATIENT
Start: 2021-09-03

## 2021-03-05 RX ORDER — ACETAMINOPHEN 325 MG/1
650 TABLET ORAL AS NEEDED
Status: CANCELLED
Start: 2021-09-03

## 2021-03-05 RX ORDER — DIPHENHYDRAMINE HYDROCHLORIDE 50 MG/ML
25 INJECTION, SOLUTION INTRAMUSCULAR; INTRAVENOUS AS NEEDED
Status: CANCELLED
Start: 2021-09-03

## 2021-03-05 RX ORDER — DIPHENHYDRAMINE HYDROCHLORIDE 50 MG/ML
50 INJECTION, SOLUTION INTRAMUSCULAR; INTRAVENOUS AS NEEDED
Status: CANCELLED
Start: 2021-09-03

## 2021-03-05 RX ORDER — HYDROCORTISONE SODIUM SUCCINATE 100 MG/2ML
100 INJECTION, POWDER, FOR SOLUTION INTRAMUSCULAR; INTRAVENOUS AS NEEDED
Status: CANCELLED | OUTPATIENT
Start: 2021-09-03

## 2021-03-05 RX ORDER — ALBUTEROL SULFATE 0.83 MG/ML
2.5 SOLUTION RESPIRATORY (INHALATION) AS NEEDED
Status: CANCELLED
Start: 2021-09-03

## 2021-03-05 RX ORDER — EPINEPHRINE 1 MG/ML
0.3 INJECTION, SOLUTION, CONCENTRATE INTRAVENOUS AS NEEDED
Status: CANCELLED | OUTPATIENT
Start: 2021-09-03

## 2021-03-05 RX ADMIN — DENOSUMAB 60 MG: 60 INJECTION SUBCUTANEOUS at 10:16

## 2021-03-05 NOTE — PROGRESS NOTES
[facility] Progress Note    Date: 2021    Name: Maximino Zurita    MRN: [id number]         : 1950     Denosumab (PROLIA) Injection (Q 6 months)      Ms. Hamlet Lynch arrived ambulatory, and in no acute distress,  to [facility] at 1008. Ms. Hamlet Lynch was assessed and education was provided. Patient denied any adverse reaction to prior injection administered in 2020, or any complaints of pain/discomfort, at this time. Medications were reviewed with no changes in her medication regimen noted. Ms. Sorin White vitals were reviewed. Visit Vitals  BP (!) 138/58 (BP 1 Location: Left upper arm, BP Patient Position: At rest;Sitting)   Pulse (!) 58   Temp 96.8 °F (36 °C)   Resp 18   Wt 72.1 kg (159 lb)   SpO2 98%   BMI 29.08 kg/m²     Lab results obtained on 3/1/2021 were reviewed prior to patient's arrival today. Serum calcium = 9.8    Prolia 60 mg inj., was administered SQ to posterior aspect of patient's left upper arm. Site without evidence of complication. Bandaid applied to injection site. Ms. Hamlet Lynch tolerated injection well and without complaints. Ms. Hamlet Lynch was discharged from [place] [address] in stable condition at 1008. She is to return on 09/10/2021 at 1000 for her next appointment. Patient is aware of scheduled appointment.     Venice Michael, RN, RN  2021

## 2021-03-24 RX ORDER — RAMIPRIL 10 MG/1
CAPSULE ORAL
Qty: 90 CAP | Refills: 1 | Status: SHIPPED | OUTPATIENT
Start: 2021-03-24 | End: 2021-09-13

## 2021-04-29 NOTE — TELEPHONE ENCOUNTER
PCP: Cindy Crowell MD    Last appt: 6/1/2020  Future Appointments   Date Time Provider Elvia Weissi   9/10/2021 10:00 AM [facility] FAST TRACK NURSE SARAH Santos [address] [facility]   11/16/2021  7:45 AM [facility] KIM FORREST BX RM 1 [facility] [address] [facility]       Requested Prescriptions     Pending Prescriptions Disp Refills    rivaroxaban (XARELTO) 20 mg tab tablet 90 Tab 3     Sig: Take 1 Tab by mouth daily. Request for a 30 or 90 day supply?  Provider Discretion    Pharmacy:     Other Comments:

## 2021-08-10 RX ORDER — CARVEDILOL 12.5 MG/1
TABLET ORAL
Qty: 180 TABLET | Refills: 3 | Status: SHIPPED | OUTPATIENT
Start: 2021-08-10 | End: 2022-03-29 | Stop reason: SDUPTHER

## 2021-09-10 ENCOUNTER — HOSPITAL ENCOUNTER (OUTPATIENT)
Dept: INFUSION THERAPY | Age: 71
Discharge: HOME | End: 2021-09-10
Payer: MEDICARE

## 2021-09-10 VITALS
SYSTOLIC BLOOD PRESSURE: 132 MMHG | RESPIRATION RATE: 18 BRPM | HEART RATE: 60 BPM | OXYGEN SATURATION: 98 % | TEMPERATURE: 98.4 F | DIASTOLIC BLOOD PRESSURE: 57 MMHG

## 2021-09-10 DIAGNOSIS — M81.0 AGE-RELATED OSTEOPOROSIS WITHOUT CURRENT PATHOLOGICAL FRACTURE: Primary | ICD-10-CM

## 2021-09-10 LAB
ANION GAP BLD CALC-SCNC: 11 MMOL/L (ref 10–20)
BUN BLD-MCNC: 13 MG/DL (ref 7–18)
CA-I BLD-MCNC: 1.28 MMOL/L (ref 1.12–1.32)
CHLORIDE BLD-SCNC: 108 MMOL/L (ref 100–108)
CO2 BLD-SCNC: 26 MMOL/L (ref 19–24)
CREAT UR-MCNC: 0.6 MG/DL (ref 0.6–1.3)
GLUCOSE BLD STRIP.AUTO-MCNC: 110 MG/DL (ref 74–106)
MAGNESIUM SERPL-MCNC: 1.7 MG/DL (ref 1.6–2.6)
PHOSPHATE SERPL-MCNC: 4.1 MG/DL (ref 2.5–4.9)
POTASSIUM BLD-SCNC: 4.2 MMOL/L (ref 3.5–5.5)
SODIUM BLD-SCNC: 144 MMOL/L (ref 136–145)

## 2021-09-10 PROCEDURE — 84100 ASSAY OF PHOSPHORUS: CPT

## 2021-09-10 PROCEDURE — 74011250636 HC RX REV CODE- 250/636: Performed by: INTERNAL MEDICINE

## 2021-09-10 PROCEDURE — 36415 COLL VENOUS BLD VENIPUNCTURE: CPT

## 2021-09-10 PROCEDURE — 96372 THER/PROPH/DIAG INJ SC/IM: CPT

## 2021-09-10 PROCEDURE — 83735 ASSAY OF MAGNESIUM: CPT

## 2021-09-10 PROCEDURE — 80047 BASIC METABLC PNL IONIZED CA: CPT

## 2021-09-10 RX ORDER — HYDROCORTISONE SODIUM SUCCINATE 100 MG/2ML
100 INJECTION, POWDER, FOR SOLUTION INTRAMUSCULAR; INTRAVENOUS AS NEEDED
Status: CANCELLED | OUTPATIENT
Start: 2022-02-27

## 2021-09-10 RX ORDER — ACETAMINOPHEN 325 MG/1
650 TABLET ORAL AS NEEDED
Status: CANCELLED
Start: 2022-02-27

## 2021-09-10 RX ORDER — DIPHENHYDRAMINE HYDROCHLORIDE 50 MG/ML
50 INJECTION, SOLUTION INTRAMUSCULAR; INTRAVENOUS AS NEEDED
Status: CANCELLED
Start: 2022-02-27

## 2021-09-10 RX ORDER — ONDANSETRON 2 MG/ML
8 INJECTION INTRAMUSCULAR; INTRAVENOUS AS NEEDED
Status: CANCELLED | OUTPATIENT
Start: 2022-02-27

## 2021-09-10 RX ORDER — DIPHENHYDRAMINE HYDROCHLORIDE 50 MG/ML
25 INJECTION, SOLUTION INTRAMUSCULAR; INTRAVENOUS AS NEEDED
Status: CANCELLED
Start: 2022-02-27

## 2021-09-10 RX ORDER — EPINEPHRINE 1 MG/ML
0.3 INJECTION, SOLUTION, CONCENTRATE INTRAVENOUS AS NEEDED
Status: CANCELLED | OUTPATIENT
Start: 2022-02-27

## 2021-09-10 RX ORDER — ALBUTEROL SULFATE 0.83 MG/ML
2.5 SOLUTION RESPIRATORY (INHALATION) AS NEEDED
Status: CANCELLED
Start: 2022-02-27

## 2021-09-10 RX ADMIN — DENOSUMAB 60 MG: 60 INJECTION SUBCUTANEOUS at 10:48

## 2021-09-10 NOTE — PROGRESS NOTES
[facility] Progress Note    Date: September 10, 2021    Name: Elfego Llanes    MRN: [id number]         : 1950     Denosumab (PROLIA) Injection (Q 6 months)      Ms. Flor Cabrera arrived ambulatory, and in no acute distress,  to [facility] at 1020. Ms. Flor Cabrera was assessed and education was provided. Ms. Iyer Record vitals were reviewed. Visit Vitals  BP (!) 132/57 (BP 1 Location: Left upper arm, BP Patient Position: Sitting)   Pulse 60   Temp 98.4 °F (36.9 °C)   Resp 18   SpO2 98%     Lab results obtained and reviewed prior to patient's arrival today. Recent Results (from the past 12 hour(s))   MAGNESIUM    Collection Time: 09/10/21 10:23 AM   Result Value Ref Range    Magnesium 1.7 1.6 - 2.6 mg/dL   PHOSPHORUS    Collection Time: 09/10/21 10:23 AM   Result Value Ref Range    Phosphorus 4.1 2.5 - 4.9 MG/DL   POC CHEM8    Collection Time: 09/10/21 10:31 AM   Result Value Ref Range    CO2, POC 26 (H) 19 - 24 MMOL/L    Glucose,  (H) 74 - 106 MG/DL    BUN, POC 13 7 - 18 MG/DL    Creatinine, POC 0.6 0.6 - 1.3 MG/DL    GFRAA, POC >60 >60 ml/min/1.73m2    GFRNA, POC >60 >60 ml/min/1.73m2    Sodium,  136 - 145 MMOL/L    Potassium, POC 4.2 3.5 - 5.5 MMOL/L    Calcium, ionized (POC) 1.28 1.12 - 1.32 mmol/L    Chloride,  100 - 108 MMOL/L    Anion gap, POC 11 10 - 20       icalcium: 1.28    Prolia 60 mg was administered SQ to posterior aspect of patient's left upper arm. Site without evidence of complication. Bandaid applied to injection site. Ms. Flor Cabrera tolerated injection well and without complaints. Ms. Flor Cabrera was discharged from [place] [address] in stable condition at 1050. She is to return on 3/11/2022 at 1000 for her next appointment. Patient is aware of scheduled appointment.     Morgan Steele RN  September 10, 2021  2628

## 2021-09-13 RX ORDER — RAMIPRIL 10 MG/1
CAPSULE ORAL
Qty: 90 CAPSULE | Refills: 1 | Status: SHIPPED | OUTPATIENT
Start: 2021-09-13 | End: 2022-03-21 | Stop reason: SDUPTHER

## 2021-11-16 ENCOUNTER — HOSPITAL ENCOUNTER (OUTPATIENT)
Dept: WOMENS IMAGING | Age: 71
Discharge: HOME | End: 2021-11-16
Attending: INTERNAL MEDICINE
Payer: MEDICARE

## 2021-11-16 ENCOUNTER — HOSPITAL ENCOUNTER (OUTPATIENT)
Dept: BONE DENSITY | Age: 71
Discharge: HOME | End: 2021-11-16
Attending: INTERNAL MEDICINE
Payer: MEDICARE

## 2021-11-16 DIAGNOSIS — M81.0 AGE-RELATED OSTEOPOROSIS WITHOUT CURRENT PATHOLOGICAL FRACTURE: ICD-10-CM

## 2021-11-16 DIAGNOSIS — Z12.31 VISIT FOR SCREENING MAMMOGRAM: ICD-10-CM

## 2021-11-16 PROCEDURE — 77063 BREAST TOMOSYNTHESIS BI: CPT

## 2021-11-16 PROCEDURE — 77080 DXA BONE DENSITY AXIAL: CPT

## 2022-02-10 RX ORDER — RAMIPRIL 10 MG/1
10 CAPSULE ORAL DAILY
Qty: 90 CAPSULE | Refills: 1 | Status: CANCELLED | OUTPATIENT
Start: 2022-02-10

## 2022-03-11 ENCOUNTER — APPOINTMENT (OUTPATIENT)
Dept: INFUSION THERAPY | Age: 72
End: 2022-03-11

## 2022-03-18 ENCOUNTER — HOSPITAL ENCOUNTER (OUTPATIENT)
Dept: INFUSION THERAPY | Age: 72
Discharge: HOME | End: 2022-03-18
Payer: MEDICARE

## 2022-03-18 VITALS
SYSTOLIC BLOOD PRESSURE: 133 MMHG | DIASTOLIC BLOOD PRESSURE: 52 MMHG | OXYGEN SATURATION: 96 % | TEMPERATURE: 97.5 F | RESPIRATION RATE: 18 BRPM | HEART RATE: 58 BPM

## 2022-03-18 DIAGNOSIS — M81.0 AGE-RELATED OSTEOPOROSIS WITHOUT CURRENT PATHOLOGICAL FRACTURE: Primary | ICD-10-CM

## 2022-03-18 LAB
ANION GAP BLD CALC-SCNC: 12 MMOL/L (ref 10–20)
BUN BLD-MCNC: 15 MG/DL (ref 7–18)
CA-I BLD-MCNC: 1.26 MMOL/L (ref 1.12–1.32)
CHLORIDE BLD-SCNC: 107 MMOL/L (ref 100–108)
CO2 BLD-SCNC: 24 MMOL/L (ref 19–24)
CREAT UR-MCNC: 0.7 MG/DL (ref 0.6–1.3)
GLUCOSE BLD STRIP.AUTO-MCNC: 114 MG/DL (ref 74–106)
POTASSIUM BLD-SCNC: 4.2 MMOL/L (ref 3.5–5.5)
SODIUM BLD-SCNC: 142 MMOL/L (ref 136–145)

## 2022-03-18 PROCEDURE — 74011250636 HC RX REV CODE- 250/636: Performed by: INTERNAL MEDICINE

## 2022-03-18 PROCEDURE — 96372 THER/PROPH/DIAG INJ SC/IM: CPT

## 2022-03-18 PROCEDURE — 36415 COLL VENOUS BLD VENIPUNCTURE: CPT

## 2022-03-18 PROCEDURE — 80047 BASIC METABLC PNL IONIZED CA: CPT

## 2022-03-18 RX ORDER — ALBUTEROL SULFATE 0.83 MG/ML
2.5 SOLUTION RESPIRATORY (INHALATION) AS NEEDED
Status: CANCELLED
Start: 2022-09-11

## 2022-03-18 RX ORDER — DIPHENHYDRAMINE HYDROCHLORIDE 50 MG/ML
25 INJECTION, SOLUTION INTRAMUSCULAR; INTRAVENOUS AS NEEDED
Status: CANCELLED
Start: 2022-03-18

## 2022-03-18 RX ORDER — EPINEPHRINE 1 MG/ML
0.3 INJECTION, SOLUTION, CONCENTRATE INTRAVENOUS AS NEEDED
Status: CANCELLED | OUTPATIENT
Start: 2022-09-11

## 2022-03-18 RX ORDER — DIPHENHYDRAMINE HYDROCHLORIDE 50 MG/ML
50 INJECTION, SOLUTION INTRAMUSCULAR; INTRAVENOUS AS NEEDED
Status: CANCELLED
Start: 2022-03-18

## 2022-03-18 RX ORDER — HYDROCORTISONE SODIUM SUCCINATE 100 MG/2ML
100 INJECTION, POWDER, FOR SOLUTION INTRAMUSCULAR; INTRAVENOUS AS NEEDED
Status: CANCELLED | OUTPATIENT
Start: 2022-03-18

## 2022-03-18 RX ORDER — EPINEPHRINE 1 MG/ML
0.3 INJECTION, SOLUTION, CONCENTRATE INTRAVENOUS AS NEEDED
Status: CANCELLED | OUTPATIENT
Start: 2022-03-18

## 2022-03-18 RX ORDER — DIPHENHYDRAMINE HYDROCHLORIDE 50 MG/ML
50 INJECTION, SOLUTION INTRAMUSCULAR; INTRAVENOUS AS NEEDED
Status: CANCELLED
Start: 2022-09-11

## 2022-03-18 RX ORDER — ACETAMINOPHEN 325 MG/1
650 TABLET ORAL AS NEEDED
Status: CANCELLED
Start: 2022-09-11

## 2022-03-18 RX ORDER — ONDANSETRON 2 MG/ML
8 INJECTION INTRAMUSCULAR; INTRAVENOUS AS NEEDED
Status: CANCELLED | OUTPATIENT
Start: 2022-03-18

## 2022-03-18 RX ORDER — DIPHENHYDRAMINE HYDROCHLORIDE 50 MG/ML
25 INJECTION, SOLUTION INTRAMUSCULAR; INTRAVENOUS AS NEEDED
Status: CANCELLED
Start: 2022-09-11

## 2022-03-18 RX ORDER — ACETAMINOPHEN 325 MG/1
650 TABLET ORAL AS NEEDED
Status: CANCELLED
Start: 2022-03-18

## 2022-03-18 RX ORDER — ONDANSETRON 2 MG/ML
8 INJECTION INTRAMUSCULAR; INTRAVENOUS AS NEEDED
Status: CANCELLED | OUTPATIENT
Start: 2022-09-11

## 2022-03-18 RX ORDER — ALBUTEROL SULFATE 0.83 MG/ML
2.5 SOLUTION RESPIRATORY (INHALATION) AS NEEDED
Status: CANCELLED
Start: 2022-03-18

## 2022-03-18 RX ORDER — HYDROCORTISONE SODIUM SUCCINATE 100 MG/2ML
100 INJECTION, POWDER, FOR SOLUTION INTRAMUSCULAR; INTRAVENOUS AS NEEDED
Status: CANCELLED | OUTPATIENT
Start: 2022-09-11

## 2022-03-18 RX ADMIN — DENOSUMAB 60 MG: 60 INJECTION SUBCUTANEOUS at 09:26

## 2022-03-18 NOTE — PROGRESS NOTES
[facility] Progress Note    Date: 2022    Name: Sree Christina    MRN: [id number]         : 1950     Denosumab (PROLIA) Injection (Q 6 months)      Ms. Jessica Fernandez arrived ambulatory, and in no acute distress,  to [facility] at 5899. Ms. Jessica Fernandez was assessed and education was provided. Ms. Velvet Terrazas vitals were reviewed. Visit Vitals  BP (!) 133/52 (BP 1 Location: Left upper arm, BP Patient Position: Sitting)   Pulse (!) 58   Temp 97.5 °F (36.4 °C)   Resp 18   SpO2 96%   Breastfeeding No     Lab results obtained and reviewed prior to patient's arrival today. Recent Results (from the past 12 hour(s))   POC CHEM8    Collection Time: 22  9:20 AM   Result Value Ref Range    CO2, POC 24 19 - 24 MMOL/L    Glucose,  (H) 74 - 106 MG/DL    BUN, POC 15 7 - 18 MG/DL    Creatinine, POC 0.7 0.6 - 1.3 MG/DL    GFRAA, POC >60 >60 ml/min/1.73m2    GFRNA, POC >60 >60 ml/min/1.73m2    Sodium,  136 - 145 MMOL/L    Potassium, POC 4.2 3.5 - 5.5 MMOL/L    Calcium, ionized (POC) 1.26 1.12 - 1.32 mmol/L    Chloride,  100 - 108 MMOL/L    Anion gap, POC 12 10 - 20       Ionized calcium: 1.26. Prolia 60 mg was administered SQ to posterior aspect of patient's left upper arm. Site without evidence of complication. Bandaid applied to injection site. Ms. Jessica Fernandez tolerated injection well and without complaints. Ms. Jessica Fernandez was discharged from [place] [address] in stable condition at 0930. She is to return on 2022 at 0900 for her next appointment. Patient is aware of scheduled appointment.     Sidra Blackman  2022  1050

## 2022-03-19 PROBLEM — G89.29 CHRONIC RIGHT SHOULDER PAIN: Status: ACTIVE | Noted: 2019-03-19

## 2022-03-19 PROBLEM — M25.511 CHRONIC RIGHT SHOULDER PAIN: Status: ACTIVE | Noted: 2019-03-19

## 2022-03-19 PROBLEM — E11.9 TYPE 2 DIABETES MELLITUS WITHOUT COMPLICATION, WITHOUT LONG-TERM CURRENT USE OF INSULIN (HCC): Status: ACTIVE | Noted: 2017-05-10

## 2022-03-21 RX ORDER — RAMIPRIL 10 MG/1
CAPSULE ORAL
Qty: 90 CAPSULE | Refills: 3 | Status: SHIPPED | OUTPATIENT
Start: 2022-03-21 | End: 2022-03-29 | Stop reason: SDUPTHER

## 2022-03-21 NOTE — TELEPHONE ENCOUNTER
PCP: Kamryn Lott MD    Last appt: 1/7/2021  Future Appointments   Date Time Provider Elvia Mejía   3/29/2022  8:30 AM Juan Enriquez MD [facility]   9/19/2022  9:00 AM [address] FAST TRACK NURSE SARAH Santos [address]   11/17/2022  7:45 AM [address] KIM STEREO BX RM [address]       Requested Prescriptions      No prescriptions requested or ordered in this encounter       Pharmacy: [facility]

## 2022-03-29 ENCOUNTER — OFFICE VISIT (OUTPATIENT)
Dept: CARDIOLOGY CLINIC | Age: 72
End: 2022-03-29
Payer: MEDICARE

## 2022-03-29 VITALS
WEIGHT: 152 LBS | HEART RATE: 62 BPM | DIASTOLIC BLOOD PRESSURE: 59 MMHG | RESPIRATION RATE: 18 BRPM | TEMPERATURE: 97.9 F | BODY MASS INDEX: 27.8 KG/M2 | SYSTOLIC BLOOD PRESSURE: 123 MMHG | OXYGEN SATURATION: 99 %

## 2022-03-29 DIAGNOSIS — I10 ESSENTIAL HYPERTENSION WITH GOAL BLOOD PRESSURE LESS THAN 140/90: Primary | ICD-10-CM

## 2022-03-29 DIAGNOSIS — E78.00 PURE HYPERCHOLESTEROLEMIA: ICD-10-CM

## 2022-03-29 DIAGNOSIS — I48.0 PAF (PAROXYSMAL ATRIAL FIBRILLATION) (HCC): ICD-10-CM

## 2022-03-29 PROCEDURE — G9899 SCRN MAM PERF RSLTS DOC: HCPCS | Performed by: INTERNAL MEDICINE

## 2022-03-29 PROCEDURE — G8428 CUR MEDS NOT DOCUMENT: HCPCS | Performed by: INTERNAL MEDICINE

## 2022-03-29 PROCEDURE — 1101F PT FALLS ASSESS-DOCD LE1/YR: CPT | Performed by: INTERNAL MEDICINE

## 2022-03-29 PROCEDURE — 3017F COLORECTAL CA SCREEN DOC REV: CPT | Performed by: INTERNAL MEDICINE

## 2022-03-29 PROCEDURE — G8752 SYS BP LESS 140: HCPCS | Performed by: INTERNAL MEDICINE

## 2022-03-29 PROCEDURE — G8754 DIAS BP LESS 90: HCPCS | Performed by: INTERNAL MEDICINE

## 2022-03-29 PROCEDURE — G8419 CALC BMI OUT NRM PARAM NOF/U: HCPCS | Performed by: INTERNAL MEDICINE

## 2022-03-29 PROCEDURE — 99214 OFFICE O/P EST MOD 30 MIN: CPT | Performed by: INTERNAL MEDICINE

## 2022-03-29 PROCEDURE — G8536 NO DOC ELDER MAL SCRN: HCPCS | Performed by: INTERNAL MEDICINE

## 2022-03-29 PROCEDURE — G8510 SCR DEP NEG, NO PLAN REQD: HCPCS | Performed by: INTERNAL MEDICINE

## 2022-03-29 PROCEDURE — 1090F PRES/ABSN URINE INCON ASSESS: CPT | Performed by: INTERNAL MEDICINE

## 2022-03-29 RX ORDER — RAMIPRIL 10 MG/1
CAPSULE ORAL
Qty: 90 CAPSULE | Refills: 3 | Status: SHIPPED | OUTPATIENT
Start: 2022-03-29

## 2022-03-29 RX ORDER — ATORVASTATIN CALCIUM 40 MG/1
40 TABLET, FILM COATED ORAL DAILY
Qty: 90 TABLET | Refills: 2 | Status: SHIPPED | OUTPATIENT
Start: 2022-03-29

## 2022-03-29 RX ORDER — CARVEDILOL 12.5 MG/1
TABLET ORAL
Qty: 180 TABLET | Refills: 3 | Status: SHIPPED | OUTPATIENT
Start: 2022-03-29

## 2022-03-29 NOTE — PROGRESS NOTES
[facility]    HPI:   Ms. Zuleyma Chun is a 66 y.o. female with history of diabetes and hypertension. She has atrial fibrillation that was diagnosed in the clinical setting of a hyperthyroid state in 2014. Paroxsymal in nature. Patient is here today for follow-up appointment. Denies any resting or exertional chest pain or chest pressure to suggest angina or any dyspnea to suggest heart failure. No presyncope or syncope  Denies any PND or LE edema. Taking all medications regularly. Denies any hospital admission or ER visits since last time. She has some DJD of the hip joint limiting activities    Past Medical History:   Diagnosis Date    Atrial fibrillation     CHADS score 2  (-CHF, +HTN, -AGE, +DM, -CVA)    Bilateral cataracts     Colon polyps     7/14  repeat 5 yrs - Dr. Jarod Rossi    Diabetes     Dyslipidemia     Electronic cigarette use     Graves disease     5/14 Grave's disease, Dr. Khoa Whalen History of seasonal allergies     Hypertension     Hypertension     Multiple thyroid nodules     5/14    Osteoporosis     last dexa 12/14    Plantar fasciitis, bilateral     Dr. Antwan Lyon Sleep apnea     mild sleep apnea, no CPAP    Vitamin D deficiency        Past Surgical History:   Procedure Laterality Date    HX CATARACT REMOVAL      bilateral    HX COLONOSCOPY      7/14  Dr. Jarod Rossi - repeat 7/19    HX LAP CHOLECYSTECTOMY      2000    HX ORTHOPAEDIC      1965  left knee    HX ORTHOPAEDIC Right     right shoulder surgery x2    HX SEPTOPLASTY      HX WISDOM TEETH EXTRACTION         Current Outpatient Medications   Medication Sig    ramipriL (ALTACE) 10 mg capsule TAKE 1 CAPSULE DAILY    carvediloL (COREG) 12.5 mg tablet TAKE 1 TABLET TWICE DAILY  WITH MEALS    rivaroxaban (XARELTO) 20 mg tab tablet Take 1 Tab by mouth daily.  atorvastatin (LIPITOR) 40 mg tablet Take 1 Tab by mouth daily.     omega 3-dha-epa-fish oil 100-160-1,000 mg cap Take 1,000 mg by mouth two (2) times a day.  glucose blood VI test strips (OneTouch Ultra Blue Test Strip) strip Use to check blood sugar once a day.  lancets (OneTouch Delica Lancets) 30 gauge misc Use to check blood sugar once a day    Blood-Glucose Meter (OneTouch Ultra2 Meter) misc Use to check blood sugar daily    cyanocobalamin (Vitamin B-12) 1,000 mcg tablet Take 1,000 mcg by mouth daily.  metFORMIN (GLUCOPHAGE) 1,000 mg tablet Take 1 Tab by mouth two (2) times daily (with meals).  Blood-Glucose Meter monitoring kit Use to check blood sugar daily    cholecalciferol, VITAMIN D3, (VITAMIN D3) 5,000 unit tab tablet Take 5,000 Units by mouth daily.  methimazole (TAPAZOLE) 10 mg tablet Take 5 mg by mouth every Monday, Wednesday, Friday.  multivitamin (ONE A DAY) tablet Take 1 Tab by mouth daily.  acetaminophen (TYLENOL) 325 mg tablet Take  by mouth every four (4) hours as needed for Pain. No current facility-administered medications for this visit. Allergies   Allergen Reactions    Sulfa (Sulfonamide Antibiotics) Other (comments)     Renal failure        Family History:    Heart Disease Father        Social History:   She  reports that she quit smoking about 8 years ago. Her smoking use included cigarettes. She has a 7.50 pack-year smoking history. She has never used smokeless tobacco.  She  reports current alcohol use of about 3.0 standard drinks of alcohol per week. Physical:   Vitals:   BP: (!) 123/59  HR: 62  Wt: 68.9 kg (152 lb)    Exam:   General: Older woman, no complaints, no distress.     Head/Neck: No JVD    No bruits. Lungs:  No respiratory distress. No rales or rhonchi  Heart:  Regular.  No murmur. No rubs or gallops. Abdomen: Soft. Bowel sounds present    No edema.     Neurological: Alert and oriented to person, place, time. No gross neurological deficit. Skin:  Warm and dry.     Review of Data:   LABS:   Lab Results   Component Value Date/Time    WBC 6.0 09/30/2021 10:20 AM    HGB 13.6 09/30/2021 10:20 AM    HCT 41.7 09/30/2021 10:20 AM    PLATELET 501 03/82/4118 10:20 AM     Lab Results   Component Value Date/Time    Sodium 138 09/30/2021 10:20 AM    Potassium 4.6 09/30/2021 10:20 AM    Chloride 106 09/30/2021 10:20 AM    CO2 26 09/30/2021 10:20 AM    Anion gap 5 09/30/2021 10:20 AM    Glucose 112 (H) 09/30/2021 10:20 AM    BUN 13 09/30/2021 10:20 AM    Creatinine 0.8 09/30/2021 10:20 AM    BUN/Creatinine ratio 17 03/01/2021 09:06 AM    GFR est AA >60.0 09/30/2021 10:20 AM    GFR est non-AA >60 09/30/2021 10:20 AM    Calcium 9.6 09/30/2021 10:20 AM    Bilirubin, total 0.9 03/29/2021 04:14 PM    Alk. phosphatase 65 03/29/2021 04:14 PM    Protein, total 7.4 03/29/2021 04:14 PM    Albumin 3.8 03/29/2021 04:14 PM    Globulin 2.9 10/19/2020 01:53 PM    A-G Ratio 1.3 10/19/2020 01:53 PM    ALT (SGPT) 26 03/29/2021 04:14 PM     Lab Results   Component Value Date/Time    Cholesterol, total 137 (L) 03/29/2021 04:14 PM    HDL Cholesterol 56 03/29/2021 04:14 PM    LDL, calculated 59 03/29/2021 04:14 PM    Triglyceride 108 03/29/2021 04:14 PM    CHOL/HDL Ratio 2.4 03/29/2021 04:14 PM     Lab Results   Component Value Date/Time    Hemoglobin A1c 5.6 09/30/2021 10:20 AM    Hemoglobin A1c (POC) 5.4 02/03/2020 03:22 PM     EKG:   sinus rhythm, 62 beats per minute, nonspecific ST-T wave changes. ECHO: 09/20  Left Ventricle Normal cavity size, wall thickness, systolic function (ejection fraction normal) and diastolic function. Wall motion: normal. The estimated EF is 55 - 60%. Visually measured ejection fraction. Wall Scoring The left ventricular wall motion is normal.            Left Atrium Normal cavity size. Left Atrium volume index is 32.34 mL/m2. Right Ventricle Normal cavity size and global systolic function. Right Atrium Normal cavity size. Aortic Valve Trileaflet valve structure and no stenosis. Mild aortic valve regurgitation. Mitral Valve No stenosis.  Mitral valve non-specific thickening. Trace regurgitation. Tricuspid Valve No stenosis. Non-specific thickening. Mild regurgitation. Pulmonic Valve Pulmonic valve not well visualized. No stenosis and no regurgitation. Aorta Normal aortic root. Pulmonary Artery Pulmonary arterial systolic pressure (PASP) is 34 mmHg. Pulmonary hypertension not suggested by Doppler findings. EKG:  Normal sinus rhythm. Non-specific ST-T wave changes. STRESS TEST (09/20)  · Baseline ECG: Sinus bradycardia, non-specific ST-T wave abnormalities. · Gated SPECT: Left ventricular function post-stress was normal. Calculated ejection fraction is 82%. · Myocardial perfusion imaging supports a low risk stress test.  · There was no convincing evidence of significant reversible defect to suggest on going major ischemia. No fixed defect to suggest infarct. Impression / Plan:      Atrial fibrillation:    Ms. Nicole Anaya was diagnosed with atrial fibrillation in the setting of hyperthyroidism. She is in sinus by exam today again. She is on Xarelto and she has no side effect from this medication. Diabetes:  Goal hemoglobin A1c less than 7 is recommended from a cardiovascular standpoint. Last hemoglobin A1c 5.6. Hyperlipidemia:  Currently she is on Atorvastatin 40 mg daily. Last lipid profile showed LDL 40 in 10/20    Hypertension:  Blood pressure today is 123/59 mm Hg. Continue ramipril, coreg. Other than the above, or unless any additional issues arise, I have asked that she follow up in nine months.

## 2022-03-29 NOTE — PROGRESS NOTES
Identified pt with two pt identifiers(name and ). Reviewed record in preparation for visit and have obtained necessary documentation. Shruthi Foster presents today for   Chief Complaint   Patient presents with    Follow-up     overdue       Pt c/o FATIGUE/WEAKNESS and HEADACHES. Shruthi Foster preferred language for health care discussion is english/other. Personal Protective Equipment:   Personal Protective Equipment was used including: mask-surgical and hands-gloves. Patient was placed on no precaution(s). Patient was masked. Precautions:   Patient currently on None  Patient currently roomed with door closed. Is someone accompanying this pt? no    Is the patient using any DME equipment during [address]? no    Depression Screening:  3 most recent PHQ Screens 10/30/2020   Little interest or pleasure in doing things Not at all   Feeling down, depressed, irritable, or hopeless Not at all   Total Score PHQ 2 0       Learning Assessment:  Learning Assessment 3/2/2016   PRIMARY LEARNER Patient   HIGHEST LEVEL OF EDUCATION - PRIMARY LEARNER  -   BARRIERS PRIMARY LEARNER -   CO-LEARNER CAREGIVER -   PRIMARY LANGUAGE ENGLISH   LEARNER PREFERENCE PRIMARY DEMONSTRATION     -     -   ANSWERED BY patient   RELATIONSHIP SELF       Abuse Screening:  Abuse Screening Questionnaire 3/2/2016   Do you ever feel afraid of your partner? N   Are you in a relationship with someone who physically or mentally threatens you? N   Is it safe for you to go home? Y       Fall Risk  Fall Risk Assessment, last 12 mths 10/30/2020   Able to walk? Yes   Fall in past 12 months? No       Pt currently taking Anticoagulant therapy? yes  Pt currently taking Antiplatelet therapy? no    Coordination of Care:  1. Have you been to the ER, urgent care clinic since your last visit? Hospitalized since your last visit? no    2.  Have you seen or consulted any other health care providers outside of the [address] [place] [address] since your last visit? Include any pap smears or colon screening. no      Please see Red banners under Allergies and Med Rec to remove outside inquires. All correct information has been verified with patient and added to chart.      Medication's patient's would liked removed has been marked not taking to be removed per Verbal order and read back per Melanie Barrera MD

## 2022-03-29 NOTE — LETTER
3/29/2022    Patient: Marielena Davidson   YOB: 1950   Date of Visit: 3/29/2022     Kamilla Henao, [address]  [address]  Via Fax: [phone]    Dear Kamilla Henao MD,      Thank you for referring Ms. Matt Bernabe to [address] [place] [address] for evaluation. My notes for this consultation are attached. If you have questions, please do not hesitate to call me. I look forward to following your patient along with you.       Sincerely,    Fermin Meeks MD

## 2022-09-19 ENCOUNTER — HOSPITAL ENCOUNTER (OUTPATIENT)
Dept: INFUSION THERAPY | Age: 72
Discharge: HOME | End: 2022-09-19
Payer: MEDICARE

## 2022-09-19 VITALS
BODY MASS INDEX: 28.83 KG/M2 | DIASTOLIC BLOOD PRESSURE: 69 MMHG | SYSTOLIC BLOOD PRESSURE: 132 MMHG | WEIGHT: 157.6 LBS | OXYGEN SATURATION: 97 % | RESPIRATION RATE: 18 BRPM | HEART RATE: 60 BPM | TEMPERATURE: 98.3 F

## 2022-09-19 DIAGNOSIS — M81.0 AGE-RELATED OSTEOPOROSIS WITHOUT CURRENT PATHOLOGICAL FRACTURE: Primary | ICD-10-CM

## 2022-09-19 LAB
ANION GAP BLD CALC-SCNC: 11 MMOL/L (ref 10–20)
BUN BLD-MCNC: 11 MG/DL (ref 7–18)
CA-I BLD-MCNC: 1.26 MMOL/L (ref 1.12–1.32)
CHLORIDE BLD-SCNC: 108 MMOL/L (ref 100–108)
CO2 BLD-SCNC: 26 MMOL/L (ref 19–24)
CREAT UR-MCNC: 0.7 MG/DL (ref 0.6–1.3)
GLUCOSE BLD STRIP.AUTO-MCNC: 134 MG/DL (ref 74–106)
MAGNESIUM SERPL-MCNC: 1.9 MG/DL (ref 1.6–2.6)
PHOSPHATE SERPL-MCNC: 5 MG/DL (ref 2.5–4.9)
POTASSIUM BLD-SCNC: 4.3 MMOL/L (ref 3.5–5.5)
SODIUM BLD-SCNC: 144 MMOL/L (ref 136–145)

## 2022-09-19 PROCEDURE — 84100 ASSAY OF PHOSPHORUS: CPT

## 2022-09-19 PROCEDURE — 96372 THER/PROPH/DIAG INJ SC/IM: CPT

## 2022-09-19 PROCEDURE — 80047 BASIC METABLC PNL IONIZED CA: CPT

## 2022-09-19 PROCEDURE — 36415 COLL VENOUS BLD VENIPUNCTURE: CPT

## 2022-09-19 PROCEDURE — 83735 ASSAY OF MAGNESIUM: CPT

## 2022-09-19 PROCEDURE — 74011250636 HC RX REV CODE- 250/636: Performed by: INTERNAL MEDICINE

## 2022-09-19 RX ORDER — ACETAMINOPHEN 325 MG/1
650 TABLET ORAL AS NEEDED
Start: 2023-03-12

## 2022-09-19 RX ORDER — DIPHENHYDRAMINE HYDROCHLORIDE 50 MG/ML
25 INJECTION, SOLUTION INTRAMUSCULAR; INTRAVENOUS AS NEEDED
Start: 2023-03-12

## 2022-09-19 RX ORDER — DIPHENHYDRAMINE HYDROCHLORIDE 50 MG/ML
50 INJECTION, SOLUTION INTRAMUSCULAR; INTRAVENOUS AS NEEDED
Start: 2023-03-12

## 2022-09-19 RX ORDER — ONDANSETRON 2 MG/ML
8 INJECTION INTRAMUSCULAR; INTRAVENOUS AS NEEDED
OUTPATIENT
Start: 2023-03-12

## 2022-09-19 RX ORDER — HYDROCORTISONE SODIUM SUCCINATE 100 MG/2ML
100 INJECTION, POWDER, FOR SOLUTION INTRAMUSCULAR; INTRAVENOUS AS NEEDED
OUTPATIENT
Start: 2023-03-12

## 2022-09-19 RX ORDER — EPINEPHRINE 1 MG/ML
0.3 INJECTION, SOLUTION, CONCENTRATE INTRAVENOUS AS NEEDED
OUTPATIENT
Start: 2023-03-12

## 2022-09-19 RX ORDER — ALBUTEROL SULFATE 0.83 MG/ML
2.5 SOLUTION RESPIRATORY (INHALATION) AS NEEDED
Start: 2023-03-12

## 2022-09-19 RX ADMIN — DENOSUMAB 60 MG: 60 INJECTION SUBCUTANEOUS at 09:04

## 2022-09-19 NOTE — PROGRESS NOTES
[facility] Progress Note    Date: 2022    Name: Vega Glass    MRN: [id number]         : 1950      Ms. Shawanda Cunha arrived to [facility] at 0827. Ms. Shawanda Cunha was assessed and education was provided. Ms. Bernardino Weiner vitals were reviewed. Visit Vitals  /69 (BP 1 Location: Left upper arm, BP Patient Position: Sitting)   Pulse 60   Temp 98.3 °F (36.8 °C)   Resp 18   Wt 71.5 kg (157 lb 9.6 oz)   SpO2 97%   BMI 28.83 kg/m²       Blood drawn for labs via Left antecubital venipuncture x1 attempt by Raudel. .    Lab results were obtained and reviewed. Recent Results (from the past 12 hour(s))   POC CHEM8    Collection Time: 22  8:56 AM   Result Value Ref Range    CO2, POC 26 (H) 19 - 24 MMOL/L    Glucose,  (H) 74 - 106 MG/DL    BUN, POC 11 7 - 18 MG/DL    Creatinine, POC 0.7 0.6 - 1.3 MG/DL    GFRAA, POC >60 >60 ml/min/1.73m2    GFRNA, POC >60 >60 ml/min/1.73m2    Sodium,  136 - 145 MMOL/L    Potassium, POC 4.3 3.5 - 5.5 MMOL/L    Calcium, ionized (POC) 1.26 1.12 - 1.32 mmol/L    Chloride,  100 - 108 MMOL/L    Anion gap, POC 11 10 - 20         Ionized Calcium = 1.26    Prolia 60 mg was administered as ordered SQ in patient's Right upper arm. Ms. Shawanda Cunha tolerated well without complaints. Discharge/ follow-up instructions discussed w/ pt. Pt verbalized understanding. Pt armband removed & shredded. Ms. Shawanda Cunha was discharged from [place] [address] in stable condition at [phone]. She is to return on 3/20/2022 at 0900 for her next appointment.     Rand Cortez RN  2022

## 2022-11-17 ENCOUNTER — HOSPITAL ENCOUNTER (OUTPATIENT)
Dept: WOMENS IMAGING | Age: 72
Discharge: HOME | End: 2022-11-17
Attending: INTERNAL MEDICINE
Payer: MEDICARE

## 2022-11-17 DIAGNOSIS — Z12.31 VISIT FOR SCREENING MAMMOGRAM: ICD-10-CM

## 2022-11-17 PROCEDURE — 77063 BREAST TOMOSYNTHESIS BI: CPT

## 2022-12-06 ENCOUNTER — OFFICE VISIT (OUTPATIENT)
Dept: CARDIOLOGY CLINIC | Age: 72
End: 2022-12-06
Payer: MEDICARE

## 2022-12-06 VITALS
SYSTOLIC BLOOD PRESSURE: 136 MMHG | HEART RATE: 62 BPM | TEMPERATURE: 98.2 F | WEIGHT: 163 LBS | BODY MASS INDEX: 29.81 KG/M2 | OXYGEN SATURATION: 98 % | DIASTOLIC BLOOD PRESSURE: 61 MMHG

## 2022-12-06 DIAGNOSIS — I10 ESSENTIAL HYPERTENSION WITH GOAL BLOOD PRESSURE LESS THAN 140/90: Primary | ICD-10-CM

## 2022-12-06 PROCEDURE — 3078F DIAST BP <80 MM HG: CPT | Performed by: INTERNAL MEDICINE

## 2022-12-06 PROCEDURE — G8417 CALC BMI ABV UP PARAM F/U: HCPCS | Performed by: INTERNAL MEDICINE

## 2022-12-06 PROCEDURE — G9899 SCRN MAM PERF RSLTS DOC: HCPCS | Performed by: INTERNAL MEDICINE

## 2022-12-06 PROCEDURE — 99214 OFFICE O/P EST MOD 30 MIN: CPT | Performed by: INTERNAL MEDICINE

## 2022-12-06 PROCEDURE — 1090F PRES/ABSN URINE INCON ASSESS: CPT | Performed by: INTERNAL MEDICINE

## 2022-12-06 PROCEDURE — 1123F ACP DISCUSS/DSCN MKR DOCD: CPT | Performed by: INTERNAL MEDICINE

## 2022-12-06 PROCEDURE — G8428 CUR MEDS NOT DOCUMENT: HCPCS | Performed by: INTERNAL MEDICINE

## 2022-12-06 PROCEDURE — G8754 DIAS BP LESS 90: HCPCS | Performed by: INTERNAL MEDICINE

## 2022-12-06 PROCEDURE — G8752 SYS BP LESS 140: HCPCS | Performed by: INTERNAL MEDICINE

## 2022-12-06 PROCEDURE — G8536 NO DOC ELDER MAL SCRN: HCPCS | Performed by: INTERNAL MEDICINE

## 2022-12-06 PROCEDURE — G8510 SCR DEP NEG, NO PLAN REQD: HCPCS | Performed by: INTERNAL MEDICINE

## 2022-12-06 PROCEDURE — 3017F COLORECTAL CA SCREEN DOC REV: CPT | Performed by: INTERNAL MEDICINE

## 2022-12-06 PROCEDURE — 93000 ELECTROCARDIOGRAM COMPLETE: CPT | Performed by: INTERNAL MEDICINE

## 2022-12-06 PROCEDURE — 3074F SYST BP LT 130 MM HG: CPT | Performed by: INTERNAL MEDICINE

## 2022-12-06 PROCEDURE — 1101F PT FALLS ASSESS-DOCD LE1/YR: CPT | Performed by: INTERNAL MEDICINE

## 2022-12-06 NOTE — LETTER
12/6/2022    Patient: Robin Paulino   YOB: 1950   Date of Visit: 12/6/2022     Saint Liner, [address]  [address]  Via Fax: [phone]    Dear Saint Liner, MD,      Thank you for referring Ms. Keely Block to [address] [place] [address] for evaluation. My notes for this consultation are attached. If you have questions, please do not hesitate to call me. I look forward to following your patient along with you.       Sincerely,    Xiomara Quevedo MD

## 2022-12-06 NOTE — PROGRESS NOTES
Identified pt with two pt identifiers(name and ). Reviewed record in preparation for visit and have obtained necessary documentation. Mei Argueta presents today for   Chief Complaint   Patient presents with    Follow-up     9m       Pt denied DIZZINESS, SOB, CHEST PAIN/ PRESSURE, FATIGUE/WEAKNESS, HEADACHES, SWELLING. Mei Argueta preferred language for health care discussion is english/other. Personal Protective Equipment:   Personal Protective Equipment was used including: mask-surgical and hands-gloves. Patient was placed on no precaution(s). Patient was not masked. Precautions:   Patient currently on None  Patient currently roomed with door closed. Is someone accompanying this pt? no    Is the patient using any DME equipment during [address]? no    Depression Screening:  3 most recent PHQ Screens 2022   Little interest or pleasure in doing things Not at all   Feeling down, depressed, irritable, or hopeless Not at all   Total Score PHQ 2 0       Learning Assessment:  Learning Assessment 3/2/2016   PRIMARY LEARNER Patient   HIGHEST LEVEL OF EDUCATION - PRIMARY LEARNER  -   BARRIERS PRIMARY LEARNER -   CO-LEARNER CAREGIVER -   PRIMARY LANGUAGE ENGLISH   LEARNER PREFERENCE PRIMARY DEMONSTRATION     -     -   ANSWERED BY patient   RELATIONSHIP SELF       Abuse Screening:  Abuse Screening Questionnaire 3/2/2016   Do you ever feel afraid of your partner? N   Are you in a relationship with someone who physically or mentally threatens you? N   Is it safe for you to go home? Y       Fall Risk  Fall Risk Assessment, last 12 mths 2022   Able to walk? Yes   Fall in past 12 months? -       Pt currently taking Anticoagulant therapy? no  Pt currently taking Antiplatelet therapy? no    Coordination of Care:  1. Have you been to the ER, urgent care clinic since your last visit? Hospitalized since your last visit? no    2.  Have you seen or consulted any other health care providers outside of the [address] since your last visit? Include any pap smears or colon screening. no      Please see Red banners under Allergies and Med Rec to remove outside inquires. All correct information has been verified with patient and added to chart.      Medication's patient's would liked removed has been marked not taking to be removed per Verbal order and read back per Portia Tovar MD

## 2022-12-06 NOTE — PROGRESS NOTES
[facility]    HPI:   Ms. Mindy Frank is a 74 y.o. female with history of diabetes and hypertension. She has atrial fibrillation that was diagnosed in the clinical setting of a hyperthyroid state in 2014. Paroxsymal in nature. Patient is here today for follow-up appointment. Denies any hospital admission or ER visit since last time  Denies any resting or exertional chest pain or chest pressure to suggest angina or any dyspnea to suggest heart failure. No presyncope or syncope  Denies any PND or LE edema. Taking all medications regularly. Denies any hospital admission or ER visits since last time. Past Medical History:   Diagnosis Date    Atrial fibrillation     CHADS score 2  (-CHF, +HTN, -AGE, +DM, -CVA)    Bilateral cataracts     Colon polyps     7/14  repeat 5 yrs - Dr. Maverick Appiah    Diabetes     Dyslipidemia     Electronic cigarette use     Graves disease     5/14 Grave's disease, Dr. Dario Ramos    History of seasonal allergies     Hypertension     Hypertension     Multiple thyroid nodules     5/14    Osteoporosis     last dexa 12/14    Plantar fasciitis, bilateral     Dr. Chalino Barrera    Sleep apnea     mild sleep apnea, no CPAP    Vitamin D deficiency        Past Surgical History:   Procedure Laterality Date    HX CATARACT REMOVAL      bilateral    HX COLONOSCOPY      7/14  Dr. Maverick Appiah - repeat 7/19    HX LAP CHOLECYSTECTOMY      [address]  left knee    HX ORTHOPAEDIC Right     right shoulder surgery x2    HX SEPTOPLASTY      HX WISDOM TEETH EXTRACTION         Current Outpatient Medications   Medication Sig    carvediloL (COREG) 12.5 mg tablet TAKE 1 TABLET TWICE DAILY  WITH MEALS    ramipriL (ALTACE) 10 mg capsule TAKE 1 CAPSULE DAILY    rivaroxaban (XARELTO) 20 mg tab tablet Take 1 Tablet by mouth daily. atorvastatin (LIPITOR) 40 mg tablet Take 1 Tablet by mouth daily.     glucose blood VI test strips (OneTouch Ultra Blue Test Strip) strip Use to check blood sugar once a day. lancets (OneTouch Delica Lancets) 30 gauge misc Use to check blood sugar once a day    cyanocobalamin 1,000 mcg tablet Take 1,000 mcg by mouth daily. metFORMIN (GLUCOPHAGE) 1,000 mg tablet Take 1 Tab by mouth two (2) times daily (with meals). cholecalciferol, VITAMIN D3, (VITAMIN D3) 5,000 unit tab tablet Take 5,000 Units by mouth daily. methimazole (TAPAZOLE) 10 mg tablet Take 5 mg by mouth every Monday, Wednesday, Friday. omega 3-dha-epa-fish oil 100-160-1,000 mg cap Take 1,000 mg by mouth two (2) times a day. multivitamin (ONE A DAY) tablet Take 1 Tab by mouth daily. acetaminophen (TYLENOL) 325 mg tablet Take  by mouth every four (4) hours as needed for Pain. Blood-Glucose Meter (OneTouch Ultra2 Meter) misc Use to check blood sugar daily    Blood-Glucose Meter monitoring kit Use to check blood sugar daily     No current facility-administered medications for this visit. Allergies   Allergen Reactions    Sulfa (Sulfonamide Antibiotics) Other (comments)     Renal failure        Family History:    Heart Disease Father        Social History:   She  reports that she quit smoking about 8 years ago. Her smoking use included cigarettes. She has a 7.50 pack-year smoking history. She has never used smokeless tobacco.  She  reports current alcohol use of about 3.0 standard drinks per week. Physical:   Vitals:   BP: (!) 142/72  HR: 63  Wt: 73.9 kg (163 lb)    Exam:   General: Older woman, no complaints, no distress. Head/Neck: No JVD    No bruits. Lungs:  No respiratory distress. No rales or rhonchi  Heart:  Regular. No murmur. No rubs or gallops. Abdomen: Soft. Bowel sounds present    No edema. Neurological: Alert and oriented to person, place, time. No gross neurological deficit. Skin:  Warm and dry.     Review of Data:   LABS:   Lab Results   Component Value Date/Time    WBC 5.2 07/14/2022 09:20 AM    HGB 12.8 (L) 07/14/2022 09:20 AM    HCT 40.1 07/14/2022 09:20 AM    PLATELET 163 00/29/9145 09:20 AM     Lab Results   Component Value Date/Time    Sodium 140 07/14/2022 09:20 AM    Potassium 4.2 07/14/2022 09:20 AM    Chloride 106 07/14/2022 09:20 AM    CO2 27 07/14/2022 09:20 AM    Anion gap 7 07/14/2022 09:20 AM    Glucose 102 07/14/2022 09:20 AM    BUN 14 07/14/2022 09:20 AM    Creatinine 0.69 07/14/2022 09:20 AM    BUN/Creatinine ratio 17 03/01/2021 09:06 AM    GFR est AA >60.0 07/14/2022 09:20 AM    GFR est non-AA >60 07/14/2022 09:20 AM    Calcium 10.1 07/14/2022 09:20 AM    Bilirubin, total 1.5 (H) 03/30/2022 08:15 AM    Alk. phosphatase 52 03/30/2022 08:15 AM    Protein, total 6.9 03/30/2022 08:15 AM    Albumin 3.5 03/30/2022 08:15 AM    Globulin 2.9 10/19/2020 01:53 PM    A-G Ratio 1.3 10/19/2020 01:53 PM    ALT (SGPT) 29 03/30/2022 08:15 AM     Lab Results   Component Value Date/Time    Cholesterol, total 130 (L) 03/30/2022 08:15 AM    HDL Cholesterol 64 03/30/2022 08:15 AM    LDL, calculated 47 03/30/2022 08:15 AM    Triglyceride 96 03/30/2022 08:15 AM    CHOL/HDL Ratio 2.0 03/30/2022 08:15 AM     Lab Results   Component Value Date/Time    Hemoglobin A1c 5.5 07/14/2022 09:20 AM    Hemoglobin A1c (POC) 5.4 02/03/2020 03:22 PM     EKG:   sinus rhythm, 62 beats per minute, nonspecific ST-T wave changes. ECHO: 09/20  Left Ventricle Normal cavity size, wall thickness, systolic function (ejection fraction normal) and diastolic function. Wall motion: normal. The estimated EF is 55 - 60%. Visually measured ejection fraction. Wall Scoring The left ventricular wall motion is normal.            Left Atrium Normal cavity size. Left Atrium volume index is 32.34 mL/m2. Right Ventricle Normal cavity size and global systolic function. Right Atrium Normal cavity size. Aortic Valve Trileaflet valve structure and no stenosis. Mild aortic valve regurgitation. Mitral Valve No stenosis. Mitral valve non-specific thickening. Trace regurgitation.    Tricuspid Valve No stenosis. Non-specific thickening. Mild regurgitation. Pulmonic Valve Pulmonic valve not well visualized. No stenosis and no regurgitation. Aorta Normal aortic root. Pulmonary Artery Pulmonary arterial systolic pressure (PASP) is 34 mmHg. Pulmonary hypertension not suggested by Doppler findings. EKG:  Normal sinus rhythm. Non-specific ST-T wave changes. STRESS TEST (09/20)  Baseline ECG: Sinus bradycardia, non-specific ST-T wave abnormalities. Gated SPECT: Left ventricular function post-stress was normal. Calculated ejection fraction is 82%. Myocardial perfusion imaging supports a low risk stress test.  There was no convincing evidence of significant reversible defect to suggest on going major ischemia. No fixed defect to suggest infarct. Impression / Plan:      Atrial fibrillation:    Ms. Eliseo Moore was diagnosed with atrial fibrillation in the setting of hyperthyroidism. She is in sinus by exam today again. She is on Xarelto and she has no side effect from this medication. We will perform EKG today in the clinic. Diabetes:  Goal hemoglobin A1c less than 7 is recommended from a cardiovascular standpoint. Last hemoglobin A1c 5.5. Hyperlipidemia:  Currently she is on Atorvastatin 40 mg daily. Last lipid profile showed LDL 47     Hypertension: /72. Patient was late for the meeting and somewhat anxious. I have asked her to check her blood pressure at home regularly. She does not check her blood pressure at home. Goal pressure discussed Continue ramipril, coreg. Other than the above, or unless any additional issues arise, I have asked that she follow up in nine months.

## 2023-01-28 ENCOUNTER — TRANSCRIBE ORDERS (OUTPATIENT)
Facility: HOSPITAL | Age: 73
End: 2023-01-28

## 2023-01-28 DIAGNOSIS — Z12.31 VISIT FOR SCREENING MAMMOGRAM: Primary | ICD-10-CM

## 2023-03-13 RX ORDER — DIPHENHYDRAMINE HYDROCHLORIDE 50 MG/ML
50 INJECTION INTRAMUSCULAR; INTRAVENOUS
Status: CANCELLED | OUTPATIENT
Start: 2023-03-22

## 2023-03-13 RX ORDER — EPINEPHRINE 1 MG/ML
0.3 INJECTION, SOLUTION, CONCENTRATE INTRAVENOUS PRN
Status: CANCELLED | OUTPATIENT
Start: 2023-03-22

## 2023-03-13 RX ORDER — ACETAMINOPHEN 325 MG/1
650 TABLET ORAL
Status: CANCELLED | OUTPATIENT
Start: 2023-03-22

## 2023-03-13 RX ORDER — ONDANSETRON 2 MG/ML
8 INJECTION INTRAMUSCULAR; INTRAVENOUS
Status: CANCELLED | OUTPATIENT
Start: 2023-03-22

## 2023-03-13 RX ORDER — SODIUM CHLORIDE 9 MG/ML
INJECTION, SOLUTION INTRAVENOUS CONTINUOUS
Status: CANCELLED | OUTPATIENT
Start: 2023-03-22

## 2023-03-13 RX ORDER — ALBUTEROL SULFATE 90 UG/1
4 AEROSOL, METERED RESPIRATORY (INHALATION) PRN
Status: CANCELLED | OUTPATIENT
Start: 2023-03-22

## 2023-03-20 ENCOUNTER — HOSPITAL ENCOUNTER (OUTPATIENT)
Facility: HOSPITAL | Age: 73
Setting detail: INFUSION SERIES
End: 2023-03-20

## 2023-03-20 ENCOUNTER — APPOINTMENT (OUTPATIENT)
Dept: INFUSION THERAPY | Age: 73
End: 2023-03-20

## 2023-03-20 DIAGNOSIS — M81.0 AGE-RELATED OSTEOPOROSIS WITHOUT CURRENT PATHOLOGICAL FRACTURE: ICD-10-CM

## 2023-03-20 DIAGNOSIS — T38.805D: Primary | ICD-10-CM

## 2023-03-22 ENCOUNTER — HOSPITAL ENCOUNTER (OUTPATIENT)
Facility: HOSPITAL | Age: 73
Setting detail: INFUSION SERIES
End: 2023-03-22
Payer: MEDICARE

## 2023-03-22 VITALS
SYSTOLIC BLOOD PRESSURE: 133 MMHG | RESPIRATION RATE: 16 BRPM | HEART RATE: 62 BPM | DIASTOLIC BLOOD PRESSURE: 58 MMHG | OXYGEN SATURATION: 98 % | TEMPERATURE: 98.4 F

## 2023-03-22 DIAGNOSIS — M81.0 AGE-RELATED OSTEOPOROSIS WITHOUT CURRENT PATHOLOGICAL FRACTURE: ICD-10-CM

## 2023-03-22 DIAGNOSIS — T38.805D: Primary | ICD-10-CM

## 2023-03-22 LAB
ALBUMIN SERPL-MCNC: 3.3 G/DL (ref 3.4–5)
ALBUMIN/GLOB SERPL: 1 (ref 0.8–1.7)
ALP SERPL-CCNC: 52 U/L (ref 45–117)
ALT SERPL-CCNC: 24 U/L (ref 13–56)
ANION GAP SERPL CALC-SCNC: 3 MMOL/L (ref 3–18)
AST SERPL-CCNC: 13 U/L (ref 10–38)
BILIRUB SERPL-MCNC: 0.6 MG/DL (ref 0.2–1)
BUN SERPL-MCNC: 14 MG/DL (ref 7–18)
BUN/CREAT SERPL: 20 (ref 12–20)
CALCIUM SERPL-MCNC: 8.7 MG/DL (ref 8.5–10.1)
CHLORIDE SERPL-SCNC: 108 MMOL/L (ref 100–111)
CO2 SERPL-SCNC: 28 MMOL/L (ref 21–32)
CREAT SERPL-MCNC: 0.7 MG/DL (ref 0.6–1.3)
GLOBULIN SER CALC-MCNC: 3.4 G/DL (ref 2–4)
GLUCOSE SERPL-MCNC: 194 MG/DL (ref 74–99)
MAGNESIUM SERPL-MCNC: 1.4 MG/DL (ref 1.6–2.6)
PHOSPHATE SERPL-MCNC: 3.8 MG/DL (ref 2.5–4.9)
POTASSIUM SERPL-SCNC: 3.9 MMOL/L (ref 3.5–5.5)
PROT SERPL-MCNC: 6.7 G/DL (ref 6.4–8.2)
SODIUM SERPL-SCNC: 139 MMOL/L (ref 136–145)

## 2023-03-22 PROCEDURE — 36415 COLL VENOUS BLD VENIPUNCTURE: CPT

## 2023-03-22 PROCEDURE — 80053 COMPREHEN METABOLIC PANEL: CPT

## 2023-03-22 PROCEDURE — 6360000002 HC RX W HCPCS: Performed by: INTERNAL MEDICINE

## 2023-03-22 PROCEDURE — 96372 THER/PROPH/DIAG INJ SC/IM: CPT

## 2023-03-22 PROCEDURE — 84100 ASSAY OF PHOSPHORUS: CPT

## 2023-03-22 PROCEDURE — 83735 ASSAY OF MAGNESIUM: CPT

## 2023-03-22 RX ORDER — DENOSUMAB 60 MG/ML
60 INJECTION SUBCUTANEOUS ONCE
COMMUNITY

## 2023-03-22 RX ORDER — CHLORAL HYDRATE 500 MG
CAPSULE ORAL DAILY
COMMUNITY

## 2023-03-22 RX ORDER — CETIRIZINE HYDROCHLORIDE 10 MG/1
10 TABLET ORAL DAILY PRN
COMMUNITY

## 2023-03-22 RX ORDER — SODIUM CHLORIDE 9 MG/ML
INJECTION, SOLUTION INTRAVENOUS CONTINUOUS
Status: CANCELLED | OUTPATIENT
Start: 2023-09-17

## 2023-03-22 RX ORDER — ALBUTEROL SULFATE 90 UG/1
4 AEROSOL, METERED RESPIRATORY (INHALATION) PRN
Status: CANCELLED | OUTPATIENT
Start: 2023-09-17

## 2023-03-22 RX ORDER — ACETAMINOPHEN 325 MG/1
650 TABLET ORAL
Status: CANCELLED | OUTPATIENT
Start: 2023-09-17

## 2023-03-22 RX ORDER — MONTELUKAST SODIUM 10 MG/1
10 TABLET ORAL DAILY
COMMUNITY

## 2023-03-22 RX ORDER — DIPHENHYDRAMINE HYDROCHLORIDE 50 MG/ML
50 INJECTION INTRAMUSCULAR; INTRAVENOUS
Status: CANCELLED | OUTPATIENT
Start: 2023-09-17

## 2023-03-22 RX ORDER — EPINEPHRINE 1 MG/ML
0.3 INJECTION, SOLUTION, CONCENTRATE INTRAVENOUS PRN
Status: CANCELLED | OUTPATIENT
Start: 2023-09-17

## 2023-03-22 RX ORDER — ONDANSETRON 2 MG/ML
8 INJECTION INTRAMUSCULAR; INTRAVENOUS
Status: CANCELLED | OUTPATIENT
Start: 2023-09-17

## 2023-03-22 RX ADMIN — DENOSUMAB 60 MG: 60 INJECTION SUBCUTANEOUS at 11:26

## 2023-03-22 ASSESSMENT — PAIN - FUNCTIONAL ASSESSMENT: PAIN_FUNCTIONAL_ASSESSMENT: NONE - DENIES PAIN

## 2023-03-22 NOTE — PROGRESS NOTES
administered SQ, in the back of her RIGHT arm at 1126, per order and without incident. Ms. Ankita Means tolerated well, and voiced no complaints. Ms. Ankita Means was discharged from [place] [address] in stable condition at 1130. She is to return in 6 months, on Wednesday, 9-20-23 at 1000, for her next appointment, for her next PROLIA injection & Labs.      Aaron Copeland RN  March 22, 2023  10:44 AM

## 2023-05-15 RX ORDER — CARVEDILOL 12.5 MG/1
TABLET ORAL
Qty: 180 TABLET | Refills: 3 | Status: SHIPPED | OUTPATIENT
Start: 2023-05-15

## 2023-05-30 RX ORDER — RIVAROXABAN 20 MG/1
TABLET, FILM COATED ORAL
Qty: 90 TABLET | Refills: 3 | Status: SHIPPED | OUTPATIENT
Start: 2023-05-30

## 2023-06-02 RX ORDER — RAMIPRIL 10 MG/1
CAPSULE ORAL
Qty: 90 CAPSULE | Refills: 3 | Status: SHIPPED | OUTPATIENT
Start: 2023-06-02

## 2023-09-20 ENCOUNTER — HOSPITAL ENCOUNTER (OUTPATIENT)
Facility: HOSPITAL | Age: 73
Setting detail: INFUSION SERIES
End: 2023-09-20
Payer: MEDICARE

## 2023-09-20 VITALS
DIASTOLIC BLOOD PRESSURE: 60 MMHG | HEART RATE: 66 BPM | RESPIRATION RATE: 18 BRPM | OXYGEN SATURATION: 97 % | TEMPERATURE: 97 F | SYSTOLIC BLOOD PRESSURE: 134 MMHG

## 2023-09-20 DIAGNOSIS — M81.0 AGE-RELATED OSTEOPOROSIS WITHOUT CURRENT PATHOLOGICAL FRACTURE: ICD-10-CM

## 2023-09-20 DIAGNOSIS — T38.805D: Primary | ICD-10-CM

## 2023-09-20 LAB
ALBUMIN SERPL-MCNC: 3.7 G/DL (ref 3.4–5)
ALBUMIN/GLOB SERPL: 1.2 (ref 0.8–1.7)
ALP SERPL-CCNC: 53 U/L (ref 45–117)
ALT SERPL-CCNC: 24 U/L (ref 13–56)
ANION GAP SERPL CALC-SCNC: 5 MMOL/L (ref 3–18)
AST SERPL-CCNC: 14 U/L (ref 10–38)
BILIRUB SERPL-MCNC: 0.8 MG/DL (ref 0.2–1)
BUN SERPL-MCNC: 12 MG/DL (ref 7–18)
BUN/CREAT SERPL: 17 (ref 12–20)
CALCIUM SERPL-MCNC: 9.2 MG/DL (ref 8.5–10.1)
CHLORIDE SERPL-SCNC: 110 MMOL/L (ref 100–111)
CO2 SERPL-SCNC: 29 MMOL/L (ref 21–32)
CREAT SERPL-MCNC: 0.71 MG/DL (ref 0.6–1.3)
GLOBULIN SER CALC-MCNC: 3 G/DL (ref 2–4)
GLUCOSE SERPL-MCNC: 189 MG/DL (ref 74–99)
MAGNESIUM SERPL-MCNC: 1.6 MG/DL (ref 1.6–2.6)
PHOSPHATE SERPL-MCNC: 3.5 MG/DL (ref 2.5–4.9)
POTASSIUM SERPL-SCNC: 3.6 MMOL/L (ref 3.5–5.5)
PROT SERPL-MCNC: 6.7 G/DL (ref 6.4–8.2)
SODIUM SERPL-SCNC: 144 MMOL/L (ref 136–145)

## 2023-09-20 PROCEDURE — 36415 COLL VENOUS BLD VENIPUNCTURE: CPT

## 2023-09-20 PROCEDURE — 96372 THER/PROPH/DIAG INJ SC/IM: CPT

## 2023-09-20 PROCEDURE — 83735 ASSAY OF MAGNESIUM: CPT

## 2023-09-20 PROCEDURE — 6360000002 HC RX W HCPCS: Performed by: INTERNAL MEDICINE

## 2023-09-20 PROCEDURE — 84100 ASSAY OF PHOSPHORUS: CPT

## 2023-09-20 PROCEDURE — 80053 COMPREHEN METABOLIC PANEL: CPT

## 2023-09-20 RX ORDER — DIPHENHYDRAMINE HYDROCHLORIDE 50 MG/ML
50 INJECTION INTRAMUSCULAR; INTRAVENOUS
OUTPATIENT
Start: 2024-03-13

## 2023-09-20 RX ORDER — ACETAMINOPHEN 325 MG/1
650 TABLET ORAL
OUTPATIENT
Start: 2024-03-13

## 2023-09-20 RX ORDER — MULTIVIT WITH MINERALS/LUTEIN
250 TABLET ORAL DAILY
COMMUNITY

## 2023-09-20 RX ORDER — SODIUM CHLORIDE 9 MG/ML
INJECTION, SOLUTION INTRAVENOUS CONTINUOUS
OUTPATIENT
Start: 2024-03-13

## 2023-09-20 RX ORDER — EPINEPHRINE 1 MG/ML
0.3 INJECTION, SOLUTION, CONCENTRATE INTRAVENOUS PRN
OUTPATIENT
Start: 2024-03-13

## 2023-09-20 RX ORDER — ALBUTEROL SULFATE 90 UG/1
4 AEROSOL, METERED RESPIRATORY (INHALATION) PRN
OUTPATIENT
Start: 2024-03-13

## 2023-09-20 RX ORDER — ONDANSETRON 2 MG/ML
8 INJECTION INTRAMUSCULAR; INTRAVENOUS
OUTPATIENT
Start: 2024-03-13

## 2023-09-20 RX ADMIN — DENOSUMAB 60 MG: 60 INJECTION SUBCUTANEOUS at 10:56

## 2023-11-06 ENCOUNTER — TRANSCRIBE ORDERS (OUTPATIENT)
Facility: HOSPITAL | Age: 73
End: 2023-11-06

## 2023-11-06 DIAGNOSIS — M81.0 SENILE OSTEOPOROSIS: Primary | ICD-10-CM

## 2023-11-15 ENCOUNTER — HOSPITAL ENCOUNTER (OUTPATIENT)
Facility: HOSPITAL | Age: 73
Discharge: HOME | End: 2023-11-18
Payer: MEDICARE

## 2023-11-15 DIAGNOSIS — M81.0 SENILE OSTEOPOROSIS: ICD-10-CM

## 2023-11-15 PROCEDURE — 77080 DXA BONE DENSITY AXIAL: CPT

## 2023-11-20 ENCOUNTER — HOSPITAL ENCOUNTER (OUTPATIENT)
Dept: WOMENS IMAGING | Facility: HOSPITAL | Age: 73
Discharge: HOME | End: 2023-11-23
Payer: MEDICARE

## 2023-11-20 DIAGNOSIS — Z12.31 VISIT FOR SCREENING MAMMOGRAM: ICD-10-CM

## 2023-11-20 PROCEDURE — 77063 BREAST TOMOSYNTHESIS BI: CPT

## 2023-12-05 ENCOUNTER — OFFICE VISIT (OUTPATIENT)
Age: 73
End: 2023-12-05
Payer: MEDICARE

## 2023-12-05 VITALS
BODY MASS INDEX: 29.26 KG/M2 | DIASTOLIC BLOOD PRESSURE: 75 MMHG | SYSTOLIC BLOOD PRESSURE: 149 MMHG | OXYGEN SATURATION: 95 % | HEART RATE: 73 BPM | WEIGHT: 160 LBS

## 2023-12-05 DIAGNOSIS — I10 ESSENTIAL HYPERTENSION WITH GOAL BLOOD PRESSURE LESS THAN 140/90: Primary | ICD-10-CM

## 2023-12-05 DIAGNOSIS — I48.0 PAF (PAROXYSMAL ATRIAL FIBRILLATION) (HCC): ICD-10-CM

## 2023-12-05 DIAGNOSIS — E78.00 PURE HYPERCHOLESTEROLEMIA: ICD-10-CM

## 2023-12-05 PROCEDURE — 1123F ACP DISCUSS/DSCN MKR DOCD: CPT | Performed by: INTERNAL MEDICINE

## 2023-12-05 PROCEDURE — G8399 PT W/DXA RESULTS DOCUMENT: HCPCS | Performed by: INTERNAL MEDICINE

## 2023-12-05 PROCEDURE — 1090F PRES/ABSN URINE INCON ASSESS: CPT | Performed by: INTERNAL MEDICINE

## 2023-12-05 PROCEDURE — 3017F COLORECTAL CA SCREEN DOC REV: CPT | Performed by: INTERNAL MEDICINE

## 2023-12-05 PROCEDURE — 3077F SYST BP >= 140 MM HG: CPT | Performed by: INTERNAL MEDICINE

## 2023-12-05 PROCEDURE — G8428 CUR MEDS NOT DOCUMENT: HCPCS | Performed by: INTERNAL MEDICINE

## 2023-12-05 PROCEDURE — G8419 CALC BMI OUT NRM PARAM NOF/U: HCPCS | Performed by: INTERNAL MEDICINE

## 2023-12-05 PROCEDURE — G8484 FLU IMMUNIZE NO ADMIN: HCPCS | Performed by: INTERNAL MEDICINE

## 2023-12-05 PROCEDURE — 99214 OFFICE O/P EST MOD 30 MIN: CPT | Performed by: INTERNAL MEDICINE

## 2023-12-05 PROCEDURE — 3078F DIAST BP <80 MM HG: CPT | Performed by: INTERNAL MEDICINE

## 2023-12-05 PROCEDURE — 1036F TOBACCO NON-USER: CPT | Performed by: INTERNAL MEDICINE

## 2023-12-05 NOTE — PROGRESS NOTES
Identified pt with two pt identifiers(name and ). Reviewed record in preparation for visit and have obtained necessary documentation. Pravin Olivo presents today for   Chief Complaint   Patient presents with    Follow-up       Pt c/o denied. Pravin Olivo preferred language for health care discussion is english/other. Personal Protective Equipment:   Personal Protective Equipment was used including: mask-surgical and hands-gloves. Patient was placed on no precaution(s). Patient was not masked. Precautions:   Patient currently on None  Patient currently roomed with door closed. Is someone accompanying this pt? no    Is the patient using any DME equipment during [address]? no    Depression Screenin/6/2022     8:35 AM 3/29/2022     8:34 AM   PHQ-9 Questionaire   Little interest or pleasure in doing things 0 0   Feeling down, depressed, or hopeless 0 0   PHQ-9 Total Score 0 0        Learning Assessment:  No question data found. Abuse Screenin/5/2023    10:00 AM   AMB Abuse Screening   Do you ever feel afraid of your partner? N   Are you in a relationship with someone who physically or mentally threatens you? N   Is it safe for you to go home? Y          Fall Risk      2023    10:14 AM   Fall Risk   2 or more falls in past year? no   Fall with injury in past year? no         Pt currently taking Anticoagulant therapy? no  Pt currently taking Antiplatelet therapy? no    Coordination of Care:  1. Have you been to the ER, urgent care clinic since your last visit? Hospitalized since your last visit? no    2. Have you seen or consulted any other health care providers outside of the [address] [place] [address] since your last visit? Include any pap smears or colon screening. no      Please see Red banners under Allergies and Med Rec to remove outside inquires. All correct information has been verified with patient and added to chart.      Medication's patient's would liked

## 2023-12-05 NOTE — PROGRESS NOTES
[facility]    Mao Boucher is 74 y.o. female     Denies any resting or exertional chest pain or chest pressure to suggest angina or any dyspnea to suggest heart failure. No presyncope or syncope  Denies any PND or LE edema. Taking all medications regularly. Denies any nausea, vomiting, abdominal pain, fever, chills, sputum production. No hematuria or other bleeding complaints    Past Medical History:   Diagnosis Date    Benign hypertensive heart disease without heart failure     Bilateral cataracts     Colon polyps     7/14  repeat 5 yrs - Dr. Kahlil Rodriguez    Diabetes [facility])     Dyslipidemia     Graves disease     5/14 Grave's disease, Dr. Chico Dee    History of seasonal allergies     Hypertension     Multiple thyroid nodules     5/14    Nicotine dependence     Osteoporosis     last dexa 12/14    PAF (paroxysmal atrial fibrillation) ([facility])     CHADS score 2  (-CHF, +HTN, -AGE, +DM, -CVA)    Plantar fasciitis, bilateral     Dr. Abelardo Kate    Sleep apnea     mild sleep apnea, no CPAP    Vitamin D deficiency        Review of Systems:  Cardiac symptoms as noted above in HPI. All others negative.     Current Outpatient Medications   Medication Sig    Ascorbic Acid (VITAMIN C) 250 MG tablet Take 1 tablet by mouth daily Pt unsure dose    ramipril (ALTACE) 10 MG capsule TAKE 1 CAPSULE DAILY    XARELTO 20 MG TABS tablet TAKE 1 TABLET DAILY    carvedilol (COREG) 12.5 MG tablet TAKE 1 TABLET TWICE DAILY  WITH MEALS    denosumab (PROLIA) 60 MG/ML SOSY SC injection Inject 60 mg into the skin once Administered Every 6 Months    montelukast (SINGULAIR) 10 MG tablet Take 10 mg by mouth daily    MULTIPLE VITAMIN PO Take by mouth Daily    Omega-3 Fatty Acids (FISH OIL) 1000 MG capsule Take by mouth daily    cetirizine (ZYRTEC) 10 MG tablet Take 10 mg by mouth daily as needed for Allergies    acetaminophen (TYLENOL) 325 MG tablet Take by mouth every 4 hours as needed

## 2024-03-18 RX ORDER — ONDANSETRON 2 MG/ML
8 INJECTION INTRAMUSCULAR; INTRAVENOUS
Status: CANCELLED | OUTPATIENT
Start: 2024-03-18

## 2024-03-18 RX ORDER — EPINEPHRINE 1 MG/ML
0.3 INJECTION, SOLUTION, CONCENTRATE INTRAVENOUS PRN
Status: CANCELLED | OUTPATIENT
Start: 2024-03-18

## 2024-03-18 RX ORDER — DIPHENHYDRAMINE HYDROCHLORIDE 50 MG/ML
50 INJECTION INTRAMUSCULAR; INTRAVENOUS
Status: CANCELLED | OUTPATIENT
Start: 2024-03-18

## 2024-03-18 RX ORDER — ACETAMINOPHEN 325 MG/1
650 TABLET ORAL
Status: CANCELLED | OUTPATIENT
Start: 2024-03-18

## 2024-03-18 RX ORDER — SODIUM CHLORIDE 9 MG/ML
INJECTION, SOLUTION INTRAVENOUS CONTINUOUS
Status: CANCELLED | OUTPATIENT
Start: 2024-03-18

## 2024-03-18 RX ORDER — ALBUTEROL SULFATE 90 UG/1
4 AEROSOL, METERED RESPIRATORY (INHALATION) PRN
Status: CANCELLED | OUTPATIENT
Start: 2024-03-18

## 2024-03-20 ENCOUNTER — HOSPITAL ENCOUNTER (OUTPATIENT)
Facility: HOSPITAL | Age: 74
Setting detail: INFUSION SERIES
Discharge: HOME | End: 2024-03-23
Payer: MEDICARE

## 2024-03-20 VITALS
RESPIRATION RATE: 16 BRPM | HEART RATE: 60 BPM | OXYGEN SATURATION: 98 % | SYSTOLIC BLOOD PRESSURE: 147 MMHG | DIASTOLIC BLOOD PRESSURE: 70 MMHG | TEMPERATURE: 98.1 F

## 2024-03-20 DIAGNOSIS — M81.0 AGE-RELATED OSTEOPOROSIS WITHOUT CURRENT PATHOLOGICAL FRACTURE: Primary | ICD-10-CM

## 2024-03-20 DIAGNOSIS — T38.805D: ICD-10-CM

## 2024-03-20 LAB
ANION GAP SERPL CALC-SCNC: 9 MMOL/L (ref 3–18)
BUN SERPL-MCNC: 11 MG/DL (ref 7–18)
BUN/CREAT SERPL: 15 (ref 12–20)
CALCIUM SERPL-MCNC: 9.6 MG/DL (ref 8.5–10.1)
CHLORIDE SERPL-SCNC: 113 MMOL/L (ref 100–111)
CO2 SERPL-SCNC: 26 MMOL/L (ref 21–32)
CREAT SERPL-MCNC: 0.71 MG/DL (ref 0.6–1.3)
GLUCOSE SERPL-MCNC: 103 MG/DL (ref 74–99)
MAGNESIUM SERPL-MCNC: 1.6 MG/DL (ref 1.6–2.6)
PHOSPHATE SERPL-MCNC: 4.4 MG/DL (ref 2.5–4.9)
POTASSIUM SERPL-SCNC: 4.2 MMOL/L (ref 3.5–5.5)
SODIUM SERPL-SCNC: 148 MMOL/L (ref 136–145)

## 2024-03-20 PROCEDURE — 84100 ASSAY OF PHOSPHORUS: CPT

## 2024-03-20 PROCEDURE — 36415 COLL VENOUS BLD VENIPUNCTURE: CPT

## 2024-03-20 PROCEDURE — 80048 BASIC METABOLIC PNL TOTAL CA: CPT

## 2024-03-20 PROCEDURE — 96372 THER/PROPH/DIAG INJ SC/IM: CPT

## 2024-03-20 PROCEDURE — 6360000002 HC RX W HCPCS: Performed by: INTERNAL MEDICINE

## 2024-03-20 PROCEDURE — 83735 ASSAY OF MAGNESIUM: CPT

## 2024-03-20 RX ORDER — ONDANSETRON 2 MG/ML
8 INJECTION INTRAMUSCULAR; INTRAVENOUS
OUTPATIENT
Start: 2024-09-15

## 2024-03-20 RX ORDER — ALBUTEROL SULFATE 90 UG/1
4 AEROSOL, METERED RESPIRATORY (INHALATION) PRN
OUTPATIENT
Start: 2024-09-15

## 2024-03-20 RX ORDER — ACETAMINOPHEN 325 MG/1
650 TABLET ORAL
OUTPATIENT
Start: 2024-09-15

## 2024-03-20 RX ORDER — SODIUM CHLORIDE 9 MG/ML
INJECTION, SOLUTION INTRAVENOUS CONTINUOUS
OUTPATIENT
Start: 2024-09-15

## 2024-03-20 RX ORDER — DIPHENHYDRAMINE HYDROCHLORIDE 50 MG/ML
50 INJECTION INTRAMUSCULAR; INTRAVENOUS
OUTPATIENT
Start: 2024-09-15

## 2024-03-20 RX ORDER — EPINEPHRINE 1 MG/ML
0.3 INJECTION, SOLUTION, CONCENTRATE INTRAVENOUS PRN
OUTPATIENT
Start: 2024-09-15

## 2024-03-20 RX ADMIN — DENOSUMAB 60 MG: 60 INJECTION SUBCUTANEOUS at 12:16

## 2024-03-20 NOTE — PROGRESS NOTES
[facility] Progress Note    Date: 2024    Name: Swati Bains    MRN: [id number]         : 1950    Prolia Injection & Labs (Q 6 Months)    Ms. Bains arrived to [facility] at 1010 ambulatory. No complaints or concerns voiced.    Ms. Bains was assessed and education was provided.     Ms. Bains's vitals were reviewed.  Vitals:    24 1010   BP: (!) 147/70   Pulse: 60   Resp: 16   Temp: 98.1 °F (36.7 °C)   SpO2: 98%       Blood drawn for labs (Mag, Phos & CMP) via L AC  venipuncture x1 attempt and sent to lab via stat . Gauze & coban applied.    Lab results were obtained and reviewed.  Recent Results (from the past 12 hour(s))   Basic Metabolic Panel    Collection Time: 24 10:15 AM   Result Value Ref Range    Sodium 148 (H) 136 - 145 mmol/L    Potassium 4.2 3.5 - 5.5 mmol/L    Chloride 113 (H) 100 - 111 mmol/L    CO2 26 21 - 32 mmol/L    Anion Gap 9 3.0 - 18 mmol/L    Glucose 103 (H) 74 - 99 mg/dL    BUN 11 7.0 - 18 MG/DL    Creatinine 0.71 0.6 - 1.3 MG/DL    Bun/Cre Ratio 15 12 - 20      Est, Glom Filt Rate >60 >60 ml/min/1.73m2    Calcium 9.6 8.5 - 10.1 MG/DL   Magnesium    Collection Time: 24 10:15 AM   Result Value Ref Range    Magnesium 1.6 1.6 - 2.6 mg/dL   Phosphorus    Collection Time: 24 10:15 AM   Result Value Ref Range    Phosphorus 4.4 2.5 - 4.9 MG/DL       Ca: 9.6    Denosumab (Prolia) 60 mg was administered as ordered SQ in patient's L upper arm. No irritation or bleeding at site. Declined bandaid.    Ms. Bains tolerated well without complaints.    Discharge/ follow-up instructions discussed w/ pt. Pt verbalized understanding.    Pt armband removed & shredded.    [facility] at 1220.  She is to return on 9/18/24 at 1000 for her next appointment.    YUMIKO SALGADO RN  2024

## 2024-06-12 RX ORDER — CARVEDILOL 12.5 MG/1
12.5 TABLET ORAL 2 TIMES DAILY WITH MEALS
Qty: 180 TABLET | Refills: 3 | Status: SHIPPED | OUTPATIENT
Start: 2024-06-12

## 2024-06-12 RX ORDER — RAMIPRIL 10 MG/1
10 CAPSULE ORAL DAILY
Qty: 90 CAPSULE | Refills: 3 | Status: SHIPPED | OUTPATIENT
Start: 2024-06-12

## 2024-06-12 NOTE — TELEPHONE ENCOUNTER
PCP: Rolf Argueta MD    Last appt:  12/5/2023   Future Appointments   Date Time Provider Department Center   9/18/2024 10:00 AM [facility]   11/25/2024  9:45 AM [facility] GARY STEREO BX RM 1 [facility]   12/5/2024  9:45 AM Kings Ocasio MD [facility] BS AMB       Requested Prescriptions     Pending Prescriptions Disp Refills    carvedilol (COREG) 12.5 MG tablet 180 tablet 3     Sig: Take 1 tablet by mouth 2 times daily (with meals)    ramipril (ALTACE) 10 MG capsule 90 capsule 3     Sig: Take 1 capsule by mouth daily       Request for a 30 or 90 day supply? Provider Discretion    Pharmacy: confirmed    Other Comments:n/a

## 2024-06-17 NOTE — TELEPHONE ENCOUNTER
PCP: Rolf Argueta MD    Last appt:  12/5/2023   Future Appointments   Date Time Provider Department Center   9/18/2024 10:00 AM [facility]   11/25/2024  9:45 AM [facility] GARY STEREO BX RM 1 [facility]   12/5/2024  9:45 AM Kings Ocasio MD [facility] BS AMB       Requested Prescriptions     Pending Prescriptions Disp Refills    rivaroxaban (XARELTO) 20 MG TABS tablet 90 tablet 3     Sig: Take 1 tablet by mouth daily       Request for a 30 or 90 day supply? Provider Discretion    Pharmacy: confirmed    Other Comments:n/a

## 2024-09-18 ENCOUNTER — HOSPITAL ENCOUNTER (OUTPATIENT)
Facility: HOSPITAL | Age: 74
Setting detail: INFUSION SERIES
End: 2024-09-18

## 2024-11-25 ENCOUNTER — HOSPITAL ENCOUNTER (OUTPATIENT)
Dept: WOMENS IMAGING | Facility: HOSPITAL | Age: 74
Discharge: HOME | End: 2024-11-28
Payer: MEDICARE

## 2024-11-25 DIAGNOSIS — Z12.31 ENCOUNTER FOR SCREENING MAMMOGRAM FOR MALIGNANT NEOPLASM OF BREAST: ICD-10-CM

## 2024-11-25 PROCEDURE — 77063 BREAST TOMOSYNTHESIS BI: CPT

## 2024-12-05 ENCOUNTER — OFFICE VISIT (OUTPATIENT)
Age: 74
End: 2024-12-05

## 2024-12-05 VITALS
DIASTOLIC BLOOD PRESSURE: 65 MMHG | OXYGEN SATURATION: 98 % | BODY MASS INDEX: 28.89 KG/M2 | SYSTOLIC BLOOD PRESSURE: 119 MMHG | HEIGHT: 62 IN | HEART RATE: 65 BPM | WEIGHT: 157 LBS

## 2024-12-05 DIAGNOSIS — E78.00 PURE HYPERCHOLESTEROLEMIA: ICD-10-CM

## 2024-12-05 DIAGNOSIS — I48.91 ATRIAL FIBRILLATION, UNSPECIFIED TYPE (HCC): ICD-10-CM

## 2024-12-05 DIAGNOSIS — I10 ESSENTIAL (PRIMARY) HYPERTENSION: Primary | ICD-10-CM

## 2024-12-05 DIAGNOSIS — I10 ESSENTIAL HYPERTENSION WITH GOAL BLOOD PRESSURE LESS THAN 140/90: ICD-10-CM

## 2024-12-05 NOTE — PROGRESS NOTES
information has been verified with patient and added to chart.     Medication's patient's would liked removed has been marked not taking to be removed per Verbal order and read back per Kings Ocasio MD

## 2024-12-05 NOTE — PROGRESS NOTES
Take 1 tablet by mouth daily    MULTIPLE VITAMIN PO Take by mouth Daily    cetirizine (ZYRTEC) 10 MG tablet Take 1 tablet by mouth daily as needed for Allergies    acetaminophen (TYLENOL) 325 MG tablet Take by mouth every 4 hours as needed    vitamin D3 (CHOLECALCIFEROL) 125 MCG (5000 UT) TABS tablet Take 1 tablet by mouth daily    cyanocobalamin 1000 MCG tablet Take 1 tablet by mouth daily    metFORMIN (GLUCOPHAGE) 1000 MG tablet Take 1 tablet by mouth 2 times daily (with meals)    methIMAzole (TAPAZOLE) 10 MG tablet Take 0.5 tablets by mouth     No current facility-administered medications for this visit.       Past Surgical History:   Procedure Laterality Date    CATARACT REMOVAL      bilateral    CHOLECYSTECTOMY, LAPAROSCOPIC      2000    COLONOSCOPY      7/14  Dr. Ruiz - repeat 7/19    ORTHOPEDIC SURGERY Right     right shoulder surgery x2    ORTHOPEDIC SURGERY      1965  left knee    SEPTOPLASTY      WISDOM TOOTH EXTRACTION         Allergies and Sensitivities:  Allergies   Allergen Reactions    Sulfa Antibiotics Other (See Comments)     Renal failure        Family History:  Family History   Problem Relation Age of Onset    Hypertension Father     Heart Disease Father     Kidney Disease Father     Glaucoma Father     Stroke Father     Thyroid Disease Father         high thyroid    Diabetes Mother     Hypertension Mother     Heart Disease Mother         chf    Kidney Disease Mother     Thyroid Disease Mother         low thyroid    Diabetes Sister     Stroke Sister     Hypertension Sister     Heart Disease Sister     Hypertension Brother     Diabetes Brother     Heart Disease Brother     Breast Cancer Paternal Aunt 80       Social History:  Social History     Tobacco Use    Smoking status: Former     Current packs/day: 0.00     Types: Cigarettes     Quit date: 1/1/2014     Years since quitting: 10.9    Smokeless tobacco: Never   Vaping Use    Vaping status: Unknown   Substance Use Topics    Alcohol use: Yes

## 2024-12-05 NOTE — PATIENT INSTRUCTIONS
[facility]cho  PATIENT PREPS:   -Wear easy to remove shirt to your appointment for easier accessibility.      **call office 3-5 days after testing is completed for results** Please ensure testing is completed prior to scheduled follow up appointment. If it is not completed your appointment may be rescheduled**

## 2025-05-23 RX ORDER — RIVAROXABAN 20 MG/1
20 TABLET, FILM COATED ORAL DAILY
Qty: 90 TABLET | Refills: 3 | Status: SHIPPED | OUTPATIENT
Start: 2025-05-23

## 2025-05-23 NOTE — TELEPHONE ENCOUNTER
PCP: Rolf Argueta MD    Last appt:  12/5/2024   Future Appointments   Date Time Provider Department Center   9/9/2025  2:00 PM Alejandra Springer PA-C [facility] BS [facility]   12/1/2025 10:20 AM SCOTT MARTINEZ BX RM 1 [facility]       Requested Prescriptions     Pending Prescriptions Disp Refills    XARELTO 20 MG TABS tablet [Pharmacy Med Name: XARELTO TABS 20MG] 90 tablet 3     Sig: TAKE 1 TABLET DAILY       Request for a 30 or 90 day supply? Provider Discretion    Pharmacy: confirmed    Other Comments: n/a

## 2025-05-27 RX ORDER — CARVEDILOL 12.5 MG/1
12.5 TABLET ORAL 2 TIMES DAILY WITH MEALS
Qty: 180 TABLET | Refills: 3 | Status: SHIPPED | OUTPATIENT
Start: 2025-05-27

## 2025-05-27 RX ORDER — RAMIPRIL 10 MG/1
10 CAPSULE ORAL DAILY
Qty: 90 CAPSULE | Refills: 3 | Status: SHIPPED | OUTPATIENT
Start: 2025-05-27

## 2025-05-27 NOTE — TELEPHONE ENCOUNTER
PCP: Rolf Argueta MD    Last appt:  12/5/2024   Future Appointments   Date Time Provider Department Center   9/9/2025  2:00 PM Alejandra Springer PA-C [facility] BS [facility]   12/1/2025 10:20 AM SCOTT MARTINEZ BX RM 1 [facility]       Requested Prescriptions     Pending Prescriptions Disp Refills    ramipril (ALTACE) 10 MG capsule [Pharmacy Med Name: RAMIPRIL CAPS 10MG] 90 capsule 3     Sig: TAKE 1 CAPSULE DAILY    carvedilol (COREG) 12.5 MG tablet [Pharmacy Med Name: CARVEDILOL (IR) TABS 12.5MG] 180 tablet 3     Sig: TAKE 1 TABLET TWICE A DAY WITH MEALS       Request for a 30 or 90 day supply? Provider Discretion    Pharmacy: confirmed     Other Comments:n/a